# Patient Record
Sex: FEMALE | Race: WHITE | NOT HISPANIC OR LATINO | URBAN - METROPOLITAN AREA
[De-identification: names, ages, dates, MRNs, and addresses within clinical notes are randomized per-mention and may not be internally consistent; named-entity substitution may affect disease eponyms.]

---

## 2017-08-24 ENCOUNTER — EMERGENCY (EMERGENCY)
Facility: HOSPITAL | Age: 82
LOS: 0 days | Discharge: HOME | End: 2017-08-25
Attending: EMERGENCY MEDICINE
Payer: COMMERCIAL

## 2017-08-24 VITALS
TEMPERATURE: 99 F | DIASTOLIC BLOOD PRESSURE: 65 MMHG | SYSTOLIC BLOOD PRESSURE: 135 MMHG | RESPIRATION RATE: 18 BRPM | HEART RATE: 70 BPM | OXYGEN SATURATION: 98 % | WEIGHT: 125 LBS | HEIGHT: 62 IN

## 2017-08-24 DIAGNOSIS — E78.5 HYPERLIPIDEMIA, UNSPECIFIED: ICD-10-CM

## 2017-08-24 DIAGNOSIS — Z01.89 ENCOUNTER FOR OTHER SPECIFIED SPECIAL EXAMINATIONS: ICD-10-CM

## 2017-08-24 DIAGNOSIS — R73.09 OTHER ABNORMAL GLUCOSE: ICD-10-CM

## 2017-08-24 DIAGNOSIS — I10 ESSENTIAL (PRIMARY) HYPERTENSION: ICD-10-CM

## 2017-08-24 DIAGNOSIS — E87.5 HYPERKALEMIA: ICD-10-CM

## 2017-08-24 LAB
POTASSIUM SERPL-MCNC: 5.2 MMOL/L — SIGNIFICANT CHANGE UP (ref 3.5–5.3)
POTASSIUM SERPL-SCNC: 5.2 MMOL/L — SIGNIFICANT CHANGE UP (ref 3.5–5.3)

## 2017-08-24 PROCEDURE — 99284 EMERGENCY DEPT VISIT MOD MDM: CPT

## 2017-08-24 NOTE — ED ADULT NURSE NOTE - OBJECTIVE STATEMENT
patient is AxOx4; saw her doctor today for blood work in the morning, was called back at night and was told to go to ER because of hyperkalimia.

## 2017-08-25 VITALS
HEART RATE: 85 BPM | SYSTOLIC BLOOD PRESSURE: 134 MMHG | RESPIRATION RATE: 20 BRPM | OXYGEN SATURATION: 99 % | TEMPERATURE: 98 F | DIASTOLIC BLOOD PRESSURE: 69 MMHG

## 2017-08-25 NOTE — ED PROVIDER NOTE - OBJECTIVE STATEMENT
Pertinent PMH/PSH/FHx/SHx and Review of Systems contained within:  89 yo f with PMH of HLD and HTN referred to ED by PMD for hyperkalemia on blood work in office. Patient denies any symptoms and states she feels well. No aggravating or relieving factors, No fever/chills, No photophobia/eye pain/changes in vision, No ear pain/sore throat/dysphagia, No chest pain/palpitations, no SOB/cough/wheeze/stridor, No abdominal pain, No N/V/D, no dysuria/frequency/discharge, No neck/back pain, no rash, no changes in neurological status/function.

## 2017-08-25 NOTE — ED PROVIDER NOTE - MEDICAL DECISION MAKING DETAILS
Labs reviewed. Patient in good condition. She is now discharged with care instructions. Patient is to follow up with pmd. Discussed results and outcome of testing with the patient.  Patient advised to please follow up with their primary care doctor within the next 24 hours and return to the Emergency Department for worsening symptoms or any other concerns.  Patient advised that their doctor may call  to follow up on the specific results of the tests performed today in the emergency department.

## 2019-08-12 ENCOUNTER — EMERGENCY (EMERGENCY)
Facility: HOSPITAL | Age: 84
LOS: 0 days | Discharge: HOME | End: 2019-08-12
Attending: EMERGENCY MEDICINE
Payer: COMMERCIAL

## 2019-08-12 ENCOUNTER — TRANSCRIPTION ENCOUNTER (OUTPATIENT)
Age: 84
End: 2019-08-12

## 2019-08-12 ENCOUNTER — INPATIENT (INPATIENT)
Facility: HOSPITAL | Age: 84
LOS: 3 days | Discharge: HOME | DRG: 86 | End: 2019-08-16
Attending: STUDENT IN AN ORGANIZED HEALTH CARE EDUCATION/TRAINING PROGRAM | Admitting: HOSPITALIST
Payer: COMMERCIAL

## 2019-08-12 VITALS
RESPIRATION RATE: 18 BRPM | TEMPERATURE: 98 F | HEART RATE: 78 BPM | SYSTOLIC BLOOD PRESSURE: 158 MMHG | OXYGEN SATURATION: 99 % | DIASTOLIC BLOOD PRESSURE: 77 MMHG

## 2019-08-12 VITALS
DIASTOLIC BLOOD PRESSURE: 68 MMHG | TEMPERATURE: 98 F | WEIGHT: 130.07 LBS | SYSTOLIC BLOOD PRESSURE: 166 MMHG | HEART RATE: 72 BPM | OXYGEN SATURATION: 100 % | RESPIRATION RATE: 18 BRPM | HEIGHT: 63 IN

## 2019-08-12 VITALS
WEIGHT: 117.07 LBS | SYSTOLIC BLOOD PRESSURE: 150 MMHG | OXYGEN SATURATION: 98 % | RESPIRATION RATE: 18 BRPM | HEART RATE: 89 BPM | TEMPERATURE: 98 F | DIASTOLIC BLOOD PRESSURE: 90 MMHG

## 2019-08-12 DIAGNOSIS — I10 ESSENTIAL (PRIMARY) HYPERTENSION: ICD-10-CM

## 2019-08-12 DIAGNOSIS — R41.82 ALTERED MENTAL STATUS, UNSPECIFIED: ICD-10-CM
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
## 2019-08-12 DIAGNOSIS — S06.2X0A DIFFUSE TRAUMATIC BRAIN INJURY WITHOUT LOSS OF CONSCIOUSNESS, INITIAL ENCOUNTER: ICD-10-CM

## 2019-08-12 DIAGNOSIS — Z79.82 LONG TERM (CURRENT) USE OF ASPIRIN: ICD-10-CM

## 2019-08-12 DIAGNOSIS — E78.5 HYPERLIPIDEMIA, UNSPECIFIED: ICD-10-CM
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
## 2019-08-12 LAB
ALBUMIN SERPL ELPH-MCNC: 3.9 G/DL — SIGNIFICANT CHANGE UP (ref 3.3–5)
ALBUMIN SERPL ELPH-MCNC: 4.4 G/DL — SIGNIFICANT CHANGE UP (ref 3.3–5)
ALP SERPL-CCNC: 68 U/L — SIGNIFICANT CHANGE UP (ref 40–120)
ALP SERPL-CCNC: 75 U/L — SIGNIFICANT CHANGE UP (ref 40–120)
ALT FLD-CCNC: 19 U/L — SIGNIFICANT CHANGE UP (ref 10–45)
ALT FLD-CCNC: 27 U/L — SIGNIFICANT CHANGE UP (ref 12–78)
ANION GAP SERPL CALC-SCNC: 12 MMOL/L — SIGNIFICANT CHANGE UP (ref 5–17)
ANION GAP SERPL CALC-SCNC: 15 MMOL/L — SIGNIFICANT CHANGE UP (ref 5–17)
APTT BLD: 27.1 SEC — LOW (ref 27.5–36.3)
APTT BLD: 29.6 SEC — SIGNIFICANT CHANGE UP (ref 28.5–37)
AST SERPL-CCNC: 25 U/L — SIGNIFICANT CHANGE UP (ref 10–40)
AST SERPL-CCNC: 27 U/L — SIGNIFICANT CHANGE UP (ref 15–37)
BASOPHILS # BLD AUTO: 0 K/UL — SIGNIFICANT CHANGE UP (ref 0–0.2)
BASOPHILS # BLD AUTO: 0.01 K/UL — SIGNIFICANT CHANGE UP (ref 0–0.2)
BASOPHILS NFR BLD AUTO: 0 % — SIGNIFICANT CHANGE UP (ref 0–2)
BASOPHILS NFR BLD AUTO: 0.1 % — SIGNIFICANT CHANGE UP (ref 0–2)
BILIRUB SERPL-MCNC: 0.9 MG/DL — SIGNIFICANT CHANGE UP (ref 0.2–1.2)
BILIRUB SERPL-MCNC: 0.9 MG/DL — SIGNIFICANT CHANGE UP (ref 0.2–1.2)
BLD GP AB SCN SERPL QL: NEGATIVE — SIGNIFICANT CHANGE UP
BLD GP AB SCN SERPL QL: SIGNIFICANT CHANGE UP
BUN SERPL-MCNC: 23 MG/DL — SIGNIFICANT CHANGE UP (ref 7–23)
BUN SERPL-MCNC: 27 MG/DL — HIGH (ref 7–23)
CALCIUM SERPL-MCNC: 9.2 MG/DL — SIGNIFICANT CHANGE UP (ref 8.5–10.1)
CALCIUM SERPL-MCNC: 9.5 MG/DL — SIGNIFICANT CHANGE UP (ref 8.4–10.5)
CHLORIDE SERPL-SCNC: 84 MMOL/L — LOW (ref 96–108)
CHLORIDE SERPL-SCNC: 87 MMOL/L — LOW (ref 96–108)
CO2 SERPL-SCNC: 22 MMOL/L — SIGNIFICANT CHANGE UP (ref 22–31)
CO2 SERPL-SCNC: 23 MMOL/L — SIGNIFICANT CHANGE UP (ref 22–31)
CREAT SERPL-MCNC: 1.29 MG/DL — SIGNIFICANT CHANGE UP (ref 0.5–1.3)
CREAT SERPL-MCNC: 1.74 MG/DL — HIGH (ref 0.5–1.3)
EOSINOPHIL # BLD AUTO: 0 K/UL — SIGNIFICANT CHANGE UP (ref 0–0.5)
EOSINOPHIL # BLD AUTO: 0 K/UL — SIGNIFICANT CHANGE UP (ref 0–0.5)
EOSINOPHIL NFR BLD AUTO: 0 % — SIGNIFICANT CHANGE UP (ref 0–6)
EOSINOPHIL NFR BLD AUTO: 0.2 % — SIGNIFICANT CHANGE UP (ref 0–6)
GLUCOSE SERPL-MCNC: 135 MG/DL — HIGH (ref 70–99)
GLUCOSE SERPL-MCNC: 151 MG/DL — HIGH (ref 70–99)
HCT VFR BLD CALC: 33.5 % — LOW (ref 34.5–45)
HCT VFR BLD CALC: 34.4 % — LOW (ref 34.5–45)
HGB BLD-MCNC: 11.8 G/DL — SIGNIFICANT CHANGE UP (ref 11.5–15.5)
HGB BLD-MCNC: 11.9 G/DL — SIGNIFICANT CHANGE UP (ref 11.5–15.5)
IMM GRANULOCYTES NFR BLD AUTO: 0.8 % — SIGNIFICANT CHANGE UP (ref 0–1.5)
INR BLD: 0.97 RATIO — SIGNIFICANT CHANGE UP (ref 0.88–1.16)
INR BLD: 1.1 RATIO — SIGNIFICANT CHANGE UP (ref 0.88–1.16)
LYMPHOCYTES # BLD AUTO: 0.5 K/UL — LOW (ref 1–3.3)
LYMPHOCYTES # BLD AUTO: 0.6 K/UL — LOW (ref 1–3.3)
LYMPHOCYTES # BLD AUTO: 4.2 % — LOW (ref 13–44)
LYMPHOCYTES # BLD AUTO: 5.6 % — LOW (ref 13–44)
MCHC RBC-ENTMCNC: 30.7 PG — SIGNIFICANT CHANGE UP (ref 27–34)
MCHC RBC-ENTMCNC: 32 PG — SIGNIFICANT CHANGE UP (ref 27–34)
MCHC RBC-ENTMCNC: 34.6 GM/DL — SIGNIFICANT CHANGE UP (ref 32–36)
MCHC RBC-ENTMCNC: 35.1 GM/DL — SIGNIFICANT CHANGE UP (ref 32–36)
MCV RBC AUTO: 88.7 FL — SIGNIFICANT CHANGE UP (ref 80–100)
MCV RBC AUTO: 91 FL — SIGNIFICANT CHANGE UP (ref 80–100)
MONOCYTES # BLD AUTO: 0.9 K/UL — SIGNIFICANT CHANGE UP (ref 0–0.9)
MONOCYTES # BLD AUTO: 1.34 K/UL — HIGH (ref 0–0.9)
MONOCYTES NFR BLD AUTO: 11.2 % — SIGNIFICANT CHANGE UP (ref 2–14)
MONOCYTES NFR BLD AUTO: 8 % — SIGNIFICANT CHANGE UP (ref 2–14)
NEUTROPHILS # BLD AUTO: 9.5 K/UL — HIGH (ref 1.8–7.4)
NEUTROPHILS # BLD AUTO: 9.98 K/UL — HIGH (ref 1.8–7.4)
NEUTROPHILS NFR BLD AUTO: 83.7 % — HIGH (ref 43–77)
NEUTROPHILS NFR BLD AUTO: 86.2 % — HIGH (ref 43–77)
NRBC # BLD: 0 /100 WBCS — SIGNIFICANT CHANGE UP (ref 0–0)
PLATELET # BLD AUTO: 219 K/UL — SIGNIFICANT CHANGE UP (ref 150–400)
PLATELET # BLD AUTO: 233 K/UL — SIGNIFICANT CHANGE UP (ref 150–400)
POTASSIUM SERPL-MCNC: 4.3 MMOL/L — SIGNIFICANT CHANGE UP (ref 3.5–5.3)
POTASSIUM SERPL-MCNC: 4.8 MMOL/L — SIGNIFICANT CHANGE UP (ref 3.5–5.3)
POTASSIUM SERPL-SCNC: 4.3 MMOL/L — SIGNIFICANT CHANGE UP (ref 3.5–5.3)
POTASSIUM SERPL-SCNC: 4.8 MMOL/L — SIGNIFICANT CHANGE UP (ref 3.5–5.3)
PROT SERPL-MCNC: 7.2 G/DL — SIGNIFICANT CHANGE UP (ref 6–8.3)
PROT SERPL-MCNC: 7.7 GM/DL — SIGNIFICANT CHANGE UP (ref 6–8.3)
PROTHROM AB SERPL-ACNC: 11.2 SEC — SIGNIFICANT CHANGE UP (ref 10–12.9)
PROTHROM AB SERPL-ACNC: 12.4 SEC — SIGNIFICANT CHANGE UP (ref 10–12.9)
RBC # BLD: 3.68 M/UL — LOW (ref 3.8–5.2)
RBC # BLD: 3.88 M/UL — SIGNIFICANT CHANGE UP (ref 3.8–5.2)
RBC # FLD: 11.4 % — SIGNIFICANT CHANGE UP (ref 10.3–14.5)
RBC # FLD: 12.4 % — SIGNIFICANT CHANGE UP (ref 10.3–14.5)
RH IG SCN BLD-IMP: NEGATIVE — SIGNIFICANT CHANGE UP
RH IG SCN BLD-IMP: NEGATIVE — SIGNIFICANT CHANGE UP
SODIUM SERPL-SCNC: 121 MMOL/L — LOW (ref 135–145)
SODIUM SERPL-SCNC: 122 MMOL/L — LOW (ref 135–145)
WBC # BLD: 11 K/UL — HIGH (ref 3.8–10.5)
WBC # BLD: 11.92 K/UL — HIGH (ref 3.8–10.5)
WBC # FLD AUTO: 11 K/UL — HIGH (ref 3.8–10.5)
WBC # FLD AUTO: 11.92 K/UL — HIGH (ref 3.8–10.5)

## 2019-08-12 PROCEDURE — 70450 CT HEAD/BRAIN W/O DYE: CPT | Mod: 26

## 2019-08-12 PROCEDURE — 99291 CRITICAL CARE FIRST HOUR: CPT

## 2019-08-12 RX ORDER — SODIUM CHLORIDE 9 MG/ML
1000 INJECTION INTRAMUSCULAR; INTRAVENOUS; SUBCUTANEOUS ONCE
Refills: 0 | Status: COMPLETED | OUTPATIENT
Start: 2019-08-12 | End: 2019-08-12

## 2019-08-12 RX ORDER — LEVETIRACETAM 250 MG/1
1000 TABLET, FILM COATED ORAL ONCE
Refills: 0 | Status: COMPLETED | OUTPATIENT
Start: 2019-08-12 | End: 2019-08-12

## 2019-08-12 RX ORDER — PROTHROMBIN COMPLEX CONCENTRATE (HUMAN) 25.5; 16.5; 24; 22; 22; 26 [IU]/ML; [IU]/ML; [IU]/ML; [IU]/ML; [IU]/ML; [IU]/ML
2500 POWDER, FOR SOLUTION INTRAVENOUS ONCE
Refills: 0 | Status: COMPLETED | OUTPATIENT
Start: 2019-08-12 | End: 2019-08-12

## 2019-08-12 RX ADMIN — SODIUM CHLORIDE 1000 MILLILITER(S): 9 INJECTION INTRAMUSCULAR; INTRAVENOUS; SUBCUTANEOUS at 21:35

## 2019-08-12 RX ADMIN — PROTHROMBIN COMPLEX CONCENTRATE (HUMAN) 400 INTERNATIONAL UNIT(S): 25.5; 16.5; 24; 22; 22; 26 POWDER, FOR SOLUTION INTRAVENOUS at 19:29

## 2019-08-12 NOTE — ED ADULT NURSE NOTE - OBJECTIVE STATEMENT
pt received to bed 26 c/o dizzy, intermittent headache, lightheadedness. pt states she fell yesterday and hit the back of her head on the bathroom floor. denies: LOC. laceration with staples noted in the back of head. pt states the staples were put in "at the medical center yesterday." denies: chest pain, SOB, n/v/d. pt is alert and oriented. small amount of blood noted on bedsheet from wound in back of head. pt states she usually walks by herself, without assistance.

## 2019-08-12 NOTE — ED PROVIDER NOTE - PROGRESS NOTE DETAILS
Joseph Frankel PGY1: Spoke with family. Daughter states that patient is not on Aspirin and has stopped it months ago. Will hold platelets for now as patient is not on Aspirin, and family did not like the idea of giving platelets. Joseph Frankel PGY1: Spoke with neurology resident. Recommended maintenance Keppra and potential NSU consult. I contacted NSU who forwarded me to Dr. Carlos Tilmlan- neurosurgery resident who said that their is no surgical intervention and thus no need for ICU. Will follow up. Joseph Frankel:  Consulted MICU for hyponatremia and q1hour neuro checks. Agreed to see patient

## 2019-08-12 NOTE — ED PROVIDER NOTE - OBJECTIVE STATEMENT
89 year old female with HTN and afib on Eliquis, Plavix, and aspirin who presents as a transfer from [place] status post head trauma secondary to fall which occurred yesterday. Patient lives alone and is completely independent. Following fall, patient went to bed. Today, daughter was speaking with patient by phone and noticed that her mother was confused. Daughter then took patient to urgent care where they sent her to the [place]. Patient found to have XXXX. They administered KCentra and transferred to [facility]. Daughter says that mom is still acting confused. Patient endorses some nausea but no vomiting. Endorses some head pain. Patient states that she began to feel funny after hitting her head yesterday. Denies vision changes. However, daughter noticed last week, that patient was acting confused last week.        At baseline, patient is independent, lives alone and is able to walk with out a walker and care for herself. No known history of previous stroke. 89 year old female with HTN and afib on Eliquis, Plavix, and aspirin who presents as a transfer from [place] status post head trauma secondary to fall which occurred yesterday. Patient lives alone and is completely independent. Following fall, patient went to bed. Today, daughter was speaking with patient by phone and noticed that her mother was confused. Daughter then took patient to urgent care where they sent her to the [place].   CT showed:   1)  hemorrhagic contusions are noted in the right temporal lobe and in   the inferior frontal lobes right greater than left. In conjunction there   is underlying scattered chronic ischemic changes in both hemispheres with   volume loss. 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  2)  there is localized extracerebral blood in the right temporal fossa   and inferior anterior cranial fossa, but there is no large subdural or   epidural collection.     They administered KCentra and transferred to [facility]. Daughter says that mom is still acting confused. Patient endorses some nausea but no vomiting. Endorses some head pain. Patient states that she began to feel funny after hitting her head yesterday. Denies vision changes. However, daughter noticed last week, that patient was acting confused last week.        At baseline, patient is independent, lives alone and is able to walk with out a walker and care for herself. No known history of previous stroke. 89 year old female with HTN and afib on Eliquis, Plavix, and aspirin who presents as a transfer from [place] status post head trauma secondary to fall which occurred yesterday. Patient lives alone and is completely independent. Following fall, patient went to bed. Today, daughter was speaking with patient by phone and noticed that her mother was confused. Daughter then took patient to urgent care where they sent her to the [place].   CT showed:   1)  hemorrhagic contusions are noted in the right temporal lobe and in   the inferior frontal lobes right greater than left. In conjunction there   is underlying scattered chronic ischemic changes in both hemispheres with   volume loss. 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  2)  there is localized extracerebral blood in the right temporal fossa   and inferior anterior cranial fossa, but there is no large subdural or   epidural collection.   They administered KCentra and transferred to [facility]. Daughter says that mom is still acting confused. Patient endorses some nausea but no vomiting. Endorses some head pain. Patient states that she began to feel funny after hitting her head yesterday. Denies vision changes. However, daughter noticed last week, that patient was acting confused last week.      At baseline, patient is independent, lives alone and is able to walk with out a walker and care for herself. No known history of previous stroke. 89 year old female with HTN and afib on Eliquis (pt and daughter deny patient is on aspirin) who presents as a transfer from [facility] post head trauma secondary to fall which occurred yesterday. Patient lives alone and is completely independent. Following fall, patient went to bed. Today, daughter was speaking with patient by phone and noticed that her mother was confused. Daughter then took patient to urgent care where they sent her to the [place].   CT showed:   1)  hemorrhagic contusions are noted in the right temporal lobe and in the inferior frontal lobes right greater than left. In conjunction there is underlying scattered chronic ischemic changes in both hemispheres with volume loss. 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  2)  there is localized extracerebral blood in the right temporal fossa and inferior anterior cranial fossa, but there is no large subdural or epidural collection.   They administered KCentra and transferred to [facility]. Daughter says that mom is still acting confused. Patient endorses some nausea but no vomiting. Endorses some head pain. Patient states that she began to feel funny after hitting her head yesterday. Denies vision changes. However, daughter noticed last week, that patient was acting confused last week.      At baseline, patient is independent, lives alone and is able to walk with out a walker and care for herself. No known history of previous stroke.

## 2019-08-12 NOTE — ED PROVIDER NOTE - CARE PLAN
Principal Discharge DX:	Altered mental status Principal Discharge DX:	Altered mental status  Secondary Diagnosis:	Traumatic hemorrhage of right cerebrum without loss of consciousness, initial encounter

## 2019-08-12 NOTE — ED ADULT NURSE NOTE - OBJECTIVE STATEMENT
89 y/o female A&Ox2 with some confusion presents to ED via EMS from [place] for head bleed. As per EMS pt fell yesterday in the bathroom, hitting posterior surface of head and was seen at [facility] today for confusion and laceration. Pt daughter reports that mother lives alone and was more confused then normal today so brought pt to Urgent Care and was told to go to ED. 1 inch laceration to posterior scalp with staples placed at [place], +swelling and redness, no active bleeding. [place] found head bleed on CT, placed 20G IV in LAC and administered Kcentra. +PERRLA 2mm, +strong hand  b/l, +flexion/extension b/l feet, +sensation to touch x4 extremities. Pt denies any weakness, numbness or tingling. No SOB, CP, fevers, chills, dysuria, n/v/d. Safety measures maintained with daughter at bedside. 89 y/o female A&Ox2 with some confusion presents to ED via EMS from [place] for head bleed. Pt takes aspirin and Eliquis daily. As per EMS pt fell yesterday in the bathroom, hitting posterior surface of head and was seen at [facility] today for confusion and laceration. Pt daughter reports that mother lives alone and was more confused then normal today so brought pt to Urgent Care and was told to go to ED. 1 inch laceration to posterior scalp with staples placed at [place], +swelling and redness, no active bleeding. [place] found head bleed on CT, placed 20G IV in LAC and administered Kcentra. +PERRLA 2mm, +strong hand  b/l, +flexion/extension b/l feet, +sensation to touch x4 extremities. +dizziness, no headache or change in vision. Pt denies any weakness, numbness or tingling. No SOB, CP, fevers, chills, dysuria, n/v/d. Safety measures maintained with daughter at bedside.

## 2019-08-12 NOTE — ED PROVIDER NOTE - SKIN, MLM
Skin normal color for race, warm, dry and intact. scalp laceration  with staples in place - still slight oozing of blood from area noted - 8cm.

## 2019-08-12 NOTE — ED ADULT NURSE NOTE - NSIMPLEMENTINTERV_GEN_ALL_ED
Implemented All Fall with Harm Risk Interventions:  [place] to call system. Call bell, personal items and telephone within reach. Instruct patient to call for assistance. Room bathroom lighting operational. Non-slip footwear when patient is off stretcher. Physically safe environment: no spills, clutter or unnecessary equipment. Stretcher in lowest position, wheels locked, appropriate side rails in place. Provide visual cue, wrist band, yellow gown, etc. Monitor gait and stability. Monitor for mental status changes and reorient to person, place, and time. Review medications for side effects contributing to fall risk. Reinforce activity limits and safety measures with patient and family. Provide visual clues: red socks.

## 2019-08-12 NOTE — ED PROVIDER NOTE - CHIEF COMPLAINT
The patient is a 89y Female complaining of The patient is a 89y Female complaining of head pain and disorientation.

## 2019-08-12 NOTE — ED PROVIDER NOTE - NS ED ROS FT
Gen: Denies fever, weight loss  CV: Denies chest pain, palpitations  Skin: Denies rash, erythema, color changes  Resp: Denies SOB, cough  Endo: Denies sensitivity to heat, cold, increased urination  GI: Denies constipation, vomiting  Msk: Denies back pain, LE swelling, extremity pain  : Denies dysuria, increased frequency  Neuro: Denies LOC, weakness, seizures  Psych: Denies hx of psych, hallucinations, +confusion

## 2019-08-12 NOTE — ED PROVIDER NOTE - PHYSICAL EXAMINATION
GENERAL: Patient lying in bed comfortably. In no acute distress. Answering questions appropriately but slightly confused.  HEENT: stapled laceration at back of skull, Pupils 4 mm at baseline to 3 mm with light, EOMI b/l, conjunctiva noninjected and anicteric,  moist mucous membranes  NECK: Supple, trachea midline  LUNG: CTAB, no w/r/r appreciated, good respiratory effort  CV: RRR, no m/r/g appreciated  ABDOMEN: Soft, NTND, no rebound or guarding, no appreciable organomegaly  MSK: No edema, no visible deformities, full range of motion UE/LE b/l, 4+/5 muscle strength LE b/l, 5/5 strength UE bl  NEURO: Patient able to communicate her name, President of [place], and that she was in hospital. Confused as to which date it is.  CN II-XII grossly intact, sensation grossly intact,   -Cranial Nerves:  --CN II: PERRLA  --CN III, IV, VI: EOMI b/l  --CN V1-3: Facial sensation intact to touch  --CN VII: No facial asymmetry or droop  --CN VIII: Hearing intact to rubbing fingers b/l  --CN IX, X: Palate elevates symmetrically. Normal phonation  --CN XI: Heading turning and shoulder shrug intact b/l  --CN XII: Tongue midline with normal movements  SKIN: Warm, dry, well perfused, no evidence of rash  PSYCH: Normal mood and affect GENERAL: Patient lying in bed comfortably. In no acute distress. Answering questions appropriately but slightly confused.  HEENT: stapled laceration at back of skull, Pupils 4 mm at baseline to 3 mm with light, EOMI b/l, conjunctiva non-injected and anicteric,  moist mucous membranes  NECK: Supple, trachea midline  LUNG: CTAB, no w/r/r appreciated, good respiratory effort  CV: RRR, no m/r/g appreciated  ABDOMEN: Soft, NTND, no rebound or guarding, no appreciable organomegaly  MSK: No edema, no visible deformities, full range of motion UE/LE b/l, 4+/5 muscle strength LE b/l, 5/5 strength UE bl  NEURO: Patient able to communicate her name, President of [place], and that she was in hospital. Confused as to which date it is.  CN II-XII grossly intact, sensation grossly intact,   -Cranial Nerves:  --CN II: PERRLA  --CN III, IV, VI: EOMI b/l  --CN V1-3: Facial sensation intact to touch  --CN VII: No facial asymmetry or droop  --CN VIII: Hearing intact to rubbing fingers b/l  --CN IX, X: Palate elevates symmetrically. Normal phonation  --CN XI: Heading turning and shoulder shrug intact b/l  --CN XII: Tongue midline with normal movements  SKIN: Warm, dry, well perfused, no evidence of rash  PSYCH: Normal mood and affect

## 2019-08-12 NOTE — ED ADULT TRIAGE NOTE - CHIEF COMPLAINT QUOTE
pt BIB daughter with c/o AMS after striking head yesterday large laceration to posterior scalp on eliquis, was seen and sent from urgent care to r/o bleed

## 2019-08-12 NOTE — ED PROVIDER NOTE - CLINICAL SUMMARY MEDICAL DECISION MAKING FREE TEXT BOX
89 year old female with HTN, HLD, and afib on Eliquis, Plavix, and aspirin who presents as a transfer from [facility] post head trauma secondary to fall which occurred yesterday. VS remarkable for hypertension. PE remarkable for altered mental status. Concern for intracranial bleed and therefore sent patient for CT. CT shows 89 year old female with HTN, HLD, and afib on Eliquis, Plavix, and aspirin who presents as a transfer from [facility] post head trauma secondary to fall which occurred yesterday. VS remarkable for hypertension. PE remarkable for altered mental status. Concern for expanding intracranial bleed and therefore sent patient for CT. Per Dr. Tillman, neurosurgery resident, CT shows stable picture of some intracerebral contusion, but no epidural or subdural noted and no need for neurosurgical intervention. Per his recs- will administer platelets and consult trauma. Will reassess.

## 2019-08-12 NOTE — ED PROVIDER NOTE - NOTES
Joseph Frankel PGY1: Spoke with Dr. Carlos Tillman of neurosurgery. Is aware of patient. Dr Tillman reviewed the CT images from [place] and one taken upon arrival at [facility] and says that the scan is stable. Does not recommend any surgical intervention at this time. Recommends platelet transfusion. I will consent patient and family regarding platelet transfusion.

## 2019-08-12 NOTE — ED PROVIDER NOTE - ATTENDING CONTRIBUTION TO CARE
Patient received as a transfer from [facility] as right frontal hemorrhagic contusions to the right temporal and frontal lobe.   Patient fell yesterday in the afternoon and slept yesterday without seeking care.   patient has been more confused, with mild nausea  mild confusion, alert to name, location, president, does not know date  non-tachycardic, non-tachypneic   cooperative  5/5 str to bilateral UE and 4+/5 bilateral LE, 5/5 sensory to bilateral UE and LE   no rash/vesicles/petechiae   stapled laceration to right temporal region  perrl ~4mm  following commands  trachea midline  will get ct head, neurosurgery consulted at this time 20:45, labs, and reassess.   Will follow up on labs, analgesia, imaging, reassess and disposition to the inpatient team as clinically indicated.

## 2019-08-12 NOTE — ED PROVIDER NOTE - OBJECTIVE STATEMENT
89 year old female with PMH of HTN, HLD, Hypothyroid on eliquis for afib otherwise presenting to ED after fall in bathroom yesterday- states missed toilet seat and ended up hitting head against part of toilet seat - seen in Urgent care yesterday and [place] placed there - today as pt with occasional periods of confusion.

## 2019-08-13 DIAGNOSIS — Z29.9 ENCOUNTER FOR PROPHYLACTIC MEASURES, UNSPECIFIED: ICD-10-CM

## 2019-08-13 DIAGNOSIS — E87.1 HYPO-OSMOLALITY AND HYPONATREMIA: ICD-10-CM

## 2019-08-13 DIAGNOSIS — E03.9 HYPOTHYROIDISM, UNSPECIFIED: ICD-10-CM

## 2019-08-13 DIAGNOSIS — I48.91 UNSPECIFIED ATRIAL FIBRILLATION: ICD-10-CM

## 2019-08-13 DIAGNOSIS — E78.5 HYPERLIPIDEMIA, UNSPECIFIED: ICD-10-CM

## 2019-08-13 DIAGNOSIS — R41.82 ALTERED MENTAL STATUS, UNSPECIFIED: ICD-10-CM

## 2019-08-13 DIAGNOSIS — S06.349A TRAUMATIC HEMORRHAGE OF RIGHT CEREBRUM WITH LOSS OF CONSCIOUSNESS OF UNSPECIFIED DURATION, INITIAL ENCOUNTER: ICD-10-CM

## 2019-08-13 DIAGNOSIS — N17.9 ACUTE KIDNEY FAILURE, UNSPECIFIED: ICD-10-CM

## 2019-08-13 DIAGNOSIS — I10 ESSENTIAL (PRIMARY) HYPERTENSION: ICD-10-CM

## 2019-08-13 DIAGNOSIS — S06.340A TRAUMATIC HEMORRHAGE OF RIGHT CEREBRUM WITHOUT LOSS OF CONSCIOUSNESS, INITIAL ENCOUNTER: ICD-10-CM

## 2019-08-13 LAB
ANION GAP SERPL CALC-SCNC: 12 MMOL/L — SIGNIFICANT CHANGE UP (ref 5–17)
ANION GAP SERPL CALC-SCNC: 12 MMOL/L — SIGNIFICANT CHANGE UP (ref 5–17)
ANION GAP SERPL CALC-SCNC: 14 MMOL/L — SIGNIFICANT CHANGE UP (ref 5–17)
BUN SERPL-MCNC: 13 MG/DL — SIGNIFICANT CHANGE UP (ref 7–23)
BUN SERPL-MCNC: 17 MG/DL — SIGNIFICANT CHANGE UP (ref 7–23)
BUN SERPL-MCNC: 18 MG/DL — SIGNIFICANT CHANGE UP (ref 7–23)
CALCIUM SERPL-MCNC: 8.6 MG/DL — SIGNIFICANT CHANGE UP (ref 8.4–10.5)
CALCIUM SERPL-MCNC: 9 MG/DL — SIGNIFICANT CHANGE UP (ref 8.4–10.5)
CALCIUM SERPL-MCNC: 9.1 MG/DL — SIGNIFICANT CHANGE UP (ref 8.4–10.5)
CHLORIDE SERPL-SCNC: 87 MMOL/L — LOW (ref 96–108)
CHLORIDE SERPL-SCNC: 91 MMOL/L — LOW (ref 96–108)
CHLORIDE SERPL-SCNC: 91 MMOL/L — LOW (ref 96–108)
CK SERPL-CCNC: 207 U/L — HIGH (ref 25–170)
CO2 SERPL-SCNC: 21 MMOL/L — LOW (ref 22–31)
CO2 SERPL-SCNC: 22 MMOL/L — SIGNIFICANT CHANGE UP (ref 22–31)
CO2 SERPL-SCNC: 24 MMOL/L — SIGNIFICANT CHANGE UP (ref 22–31)
CREAT ?TM UR-MCNC: 70 MG/DL — SIGNIFICANT CHANGE UP
CREAT SERPL-MCNC: 0.79 MG/DL — SIGNIFICANT CHANGE UP (ref 0.5–1.3)
CREAT SERPL-MCNC: 0.87 MG/DL — SIGNIFICANT CHANGE UP (ref 0.5–1.3)
CREAT SERPL-MCNC: 1 MG/DL — SIGNIFICANT CHANGE UP (ref 0.5–1.3)
GLUCOSE SERPL-MCNC: 102 MG/DL — HIGH (ref 70–99)
GLUCOSE SERPL-MCNC: 114 MG/DL — HIGH (ref 70–99)
GLUCOSE SERPL-MCNC: 143 MG/DL — HIGH (ref 70–99)
OSMOLALITY SERPL: 264 MOSMOL/KG — LOW (ref 280–301)
OSMOLALITY UR: 467 MOS/KG — SIGNIFICANT CHANGE UP (ref 300–900)
OSMOLALITY UR: 575 MOS/KG — SIGNIFICANT CHANGE UP (ref 300–900)
POTASSIUM SERPL-MCNC: 4.1 MMOL/L — SIGNIFICANT CHANGE UP (ref 3.5–5.3)
POTASSIUM SERPL-MCNC: 4.1 MMOL/L — SIGNIFICANT CHANGE UP (ref 3.5–5.3)
POTASSIUM SERPL-MCNC: 4.6 MMOL/L — SIGNIFICANT CHANGE UP (ref 3.5–5.3)
POTASSIUM SERPL-SCNC: 4.1 MMOL/L — SIGNIFICANT CHANGE UP (ref 3.5–5.3)
POTASSIUM SERPL-SCNC: 4.1 MMOL/L — SIGNIFICANT CHANGE UP (ref 3.5–5.3)
POTASSIUM SERPL-SCNC: 4.6 MMOL/L — SIGNIFICANT CHANGE UP (ref 3.5–5.3)
POTASSIUM UR-SCNC: 36 MMOL/L — SIGNIFICANT CHANGE UP
SODIUM SERPL-SCNC: 122 MMOL/L — LOW (ref 135–145)
SODIUM SERPL-SCNC: 125 MMOL/L — LOW (ref 135–145)
SODIUM SERPL-SCNC: 127 MMOL/L — LOW (ref 135–145)
SODIUM UR-SCNC: 75 MMOL/L — SIGNIFICANT CHANGE UP
SODIUM UR-SCNC: 88 MMOL/L — SIGNIFICANT CHANGE UP

## 2019-08-13 PROCEDURE — 99223 1ST HOSP IP/OBS HIGH 75: CPT | Mod: GC

## 2019-08-13 PROCEDURE — 99222 1ST HOSP IP/OBS MODERATE 55: CPT

## 2019-08-13 PROCEDURE — 99285 EMERGENCY DEPT VISIT HI MDM: CPT | Mod: GC

## 2019-08-13 RX ORDER — AMLODIPINE BESYLATE 2.5 MG/1
1 TABLET ORAL
Qty: 0 | Refills: 0 | DISCHARGE

## 2019-08-13 RX ORDER — LEVOTHYROXINE SODIUM 125 MCG
1 TABLET ORAL
Qty: 0 | Refills: 0 | DISCHARGE

## 2019-08-13 RX ORDER — LEVOTHYROXINE SODIUM 125 MCG
50 TABLET ORAL DAILY
Refills: 0 | Status: DISCONTINUED | OUTPATIENT
Start: 2019-08-13 | End: 2019-08-16

## 2019-08-13 RX ORDER — SODIUM CHLORIDE 9 MG/ML
1 INJECTION INTRAMUSCULAR; INTRAVENOUS; SUBCUTANEOUS
Refills: 0 | Status: DISCONTINUED | OUTPATIENT
Start: 2019-08-13 | End: 2019-08-14

## 2019-08-13 RX ORDER — ATENOLOL 25 MG/1
25 TABLET ORAL EVERY 12 HOURS
Refills: 0 | Status: DISCONTINUED | OUTPATIENT
Start: 2019-08-13 | End: 2019-08-16

## 2019-08-13 RX ORDER — AMLODIPINE BESYLATE 2.5 MG/1
5 TABLET ORAL DAILY
Refills: 0 | Status: DISCONTINUED | OUTPATIENT
Start: 2019-08-13 | End: 2019-08-16

## 2019-08-13 RX ORDER — SODIUM CHLORIDE 5 G/100ML
500 INJECTION, SOLUTION INTRAVENOUS
Refills: 0 | Status: DISCONTINUED | OUTPATIENT
Start: 2019-08-13 | End: 2019-08-14

## 2019-08-13 RX ORDER — ATORVASTATIN CALCIUM 80 MG/1
0 TABLET, FILM COATED ORAL
Qty: 0 | Refills: 0 | DISCHARGE

## 2019-08-13 RX ORDER — LEVETIRACETAM 250 MG/1
1000 TABLET, FILM COATED ORAL ONCE
Refills: 0 | Status: DISCONTINUED | OUTPATIENT
Start: 2019-08-13 | End: 2019-08-13

## 2019-08-13 RX ORDER — AMLODIPINE BESYLATE 2.5 MG/1
0 TABLET ORAL
Qty: 0 | Refills: 0 | DISCHARGE

## 2019-08-13 RX ORDER — SODIUM CHLORIDE 9 MG/ML
1000 INJECTION INTRAMUSCULAR; INTRAVENOUS; SUBCUTANEOUS ONCE
Refills: 0 | Status: COMPLETED | OUTPATIENT
Start: 2019-08-13 | End: 2019-08-13

## 2019-08-13 RX ORDER — LEVOTHYROXINE SODIUM 125 MCG
0 TABLET ORAL
Qty: 0 | Refills: 0 | DISCHARGE

## 2019-08-13 RX ORDER — ASPIRIN/CALCIUM CARB/MAGNESIUM 324 MG
0 TABLET ORAL
Qty: 0 | Refills: 0 | DISCHARGE

## 2019-08-13 RX ORDER — ATENOLOL 25 MG/1
1 TABLET ORAL
Qty: 0 | Refills: 0 | DISCHARGE

## 2019-08-13 RX ORDER — ATORVASTATIN CALCIUM 80 MG/1
10 TABLET, FILM COATED ORAL AT BEDTIME
Refills: 0 | Status: DISCONTINUED | OUTPATIENT
Start: 2019-08-13 | End: 2019-08-16

## 2019-08-13 RX ORDER — LISINOPRIL 2.5 MG/1
0 TABLET ORAL
Qty: 0 | Refills: 0 | DISCHARGE

## 2019-08-13 RX ORDER — ATENOLOL 25 MG/1
0 TABLET ORAL
Qty: 0 | Refills: 0 | DISCHARGE

## 2019-08-13 RX ADMIN — SODIUM CHLORIDE 1000 MILLILITER(S): 9 INJECTION INTRAMUSCULAR; INTRAVENOUS; SUBCUTANEOUS at 04:37

## 2019-08-13 RX ADMIN — SODIUM CHLORIDE 1 GRAM(S): 9 INJECTION INTRAMUSCULAR; INTRAVENOUS; SUBCUTANEOUS at 22:22

## 2019-08-13 RX ADMIN — ATORVASTATIN CALCIUM 10 MILLIGRAM(S): 80 TABLET, FILM COATED ORAL at 22:22

## 2019-08-13 RX ADMIN — SODIUM CHLORIDE 70 MILLILITER(S): 5 INJECTION, SOLUTION INTRAVENOUS at 23:13

## 2019-08-13 RX ADMIN — LEVETIRACETAM 400 MILLIGRAM(S): 250 TABLET, FILM COATED ORAL at 00:46

## 2019-08-13 RX ADMIN — SODIUM CHLORIDE 1000 MILLILITER(S): 9 INJECTION INTRAMUSCULAR; INTRAVENOUS; SUBCUTANEOUS at 03:00

## 2019-08-13 RX ADMIN — SODIUM CHLORIDE 70 MILLILITER(S): 5 INJECTION, SOLUTION INTRAVENOUS at 22:19

## 2019-08-13 RX ADMIN — ATENOLOL 25 MILLIGRAM(S): 25 TABLET ORAL at 18:20

## 2019-08-13 NOTE — H&P ADULT - NSHPLABSRESULTS_GEN_ALL_CORE
LABS:                        11.8   11.0  )-----------( 219      ( 12 Aug 2019 22:04 )             33.5     13 Aug 2019 10:45    127    |  91     |  13     ----------------------------<  102    4.1     |  24     |  0.79     Ca    8.6        13 Aug 2019 10:45    TPro  7.2    /  Alb  4.4    /  TBili  0.9    /  DBili  x      /  AST  25     /  ALT  19     /  AlkPhos  68     12 Aug 2019 22:04    PT/INR - ( 12 Aug 2019 22:04 )   PT: 11.2 sec;   INR: 0.97 ratio         PTT - ( 12 Aug 2019 22:04 )  PTT:27.1 sec  CAPILLARY BLOOD GLUCOSE        BLOOD CULTURE    RADIOLOGY & ADDITIONAL TESTS:    EXAM:  CT BRAIN                            PROCEDURE DATE:  08/12/2019            INTERPRETATION:  CLINICAL INFORMATION: Intracranial hemorrhage. Evaluate   for progression.    TECHNIQUE: Noncontrast axial CT images were acquired through the head.   Two-dimensional sagittal and coronal reformats were generated.    COMPARISON STUDY: None    FINDINGS:     Hemorrhagic contusion in the right temporal lobe with surrounding areas   of scattered subarachnoid hemorrhage is without significant change.   Inferior right frontal hemorrhage in the region of the gyrus rectus also   appears grossly unchanged allowing for differences in head positioning   and slice angulation. There is questionable hemorrhage in the medial   inferior left frontal lobe as well. Focus of subarachnoid hemorrhage in   the left anterior middle cranial fossa (series 3 image 15) is stable. No   new areas of hemorrhage are seen. No evidence for acute large vascular   territory infarct. Stable small chronic appearing infarcts in the medial   left occipital and posterior left temporal lobe and PCA distribution.   Additional patchy areas of low-attenuation bihemispheric white matter are   nonspecific but likely scalloped mild chronic microvascular change.    There is no midline shift. There is no hydrocephalus.    The calvarium is intact. The soft tissues of the head are unremarkable.    Maxillary mucosal thickening and partial opacification within the   bilateral mastoid air cells and left sphenoid sinus comment unchanged.    IMPRESSION:     Stable exam. Inferior frontal and anterior right temporal lobe   hemorrhagic contusions and scattered subarachnoid hemorrhage as above. No   new hemorrhage or midline shift.                LOBO SELF M.D., RADIOLOGIST RESIDENT  This document has been electronically signed.  TANNER BOOTHE M.D., RADIOLOGIST  This document has been electronically signed. Aug 12 2019 10:29PM                Imaging Personally Reviewed:  [X] YES

## 2019-08-13 NOTE — H&P ADULT - ASSESSMENT
89-yo F w/ PMH of atrial fibrillation on Eliquis, CKD Stage 3, HTN, HLD, and hypothyroidism, sent from [place] for hemorrhagic R temporal contusion with scattered SAH s/p fall.

## 2019-08-13 NOTE — H&P ADULT - PROBLEM SELECTOR PLAN 3
- Presents with SCr 1.74. Baseline 1s - 1.2s.  - CKD stage 3 at baseline on outpatient record.  - S/p 3L NS in ED. SCr normalized to 0.79 and eGFR 65  - Will continue to trend BMP - Presents with SCr 1.74 to VS  Baseline 1s - 1.2s.  - CKD stage 3 at baseline on outpatient record.  - S/p 3L NS in ED. SCr normalized to 0.79 and eGFR 65  - Will continue to trend BMP

## 2019-08-13 NOTE — H&P ADULT - NSICDXPASTMEDICALHX_GEN_ALL_CORE_FT
PAST MEDICAL HISTORY:  Atrial fibrillation     HLD (hyperlipidemia)     HTN (hypertension)     Hypothyroidism

## 2019-08-13 NOTE — H&P ADULT - PROBLEM SELECTOR PLAN 2
- Hyponatremia at 122 noted on presentation  - Uosm 575, Cathleen 75, Sosm 264 noted. Likely SIADH  - Patient is noted to have chronically low serum Na since Aug 2018, however WNL previously.  - No focal neuro deficit noted at this time  - Nephrology consulted. Recs appreciated.  - S/p 3L NS. Corrected to 127 at this time. Will hold off further IVF hydration.  - Will recheck Uosm. Will order CK and renal US  - Q6H BMP  - Avoid rapid correction. Goal up to 10 mmol/L in 24 hours. - Hyponatremia at 122 noted on presentation to [place]  - Uosm 575, Cathleen 75, Sosm 264 noted. Likely SIADH  - Patient is noted to have chronically low serum Na since Aug 2018, however WNL previously.  - No focal neuro deficit noted at this time  - Nephrology consulted. Recs appreciated.  - S/p 3L NS. Corrected to 127 at this time. Will hold off further IVF hydration.  - Will recheck Uosm. Will order CK and renal US  - Q6H BMP  - Avoid rapid correction. Goal up to 10 mmol/L in 24 hours.

## 2019-08-13 NOTE — H&P ADULT - HISTORY OF PRESENT ILLNESS
Temo Summers MD, PhD | PGY-2, Day Admit  Department of Internal Medicine  Pager [phone] ([facility]) / [phone] ([facility])  Spectra [phone]    89-yo F w/ PMH of atrial fibrillation on Eliquis, CKD Stage 3, HTN, HLD, and hypothyroidism, sent from [place] for hemorrhagic R temporal contusion with scattered SAH s/p fall. Patient slipped while trying to use the toilet. Patient hit the back of the head onto the toilet. Patient remembers the fall and head trauma. No LOC. No visual hallucination, palpitations, or vomiting before or after the fall. Patient went to the bed to lie down. She had a phone conversation with her daughter, who noticed patient stuttering and searching for words. Patient presented to urgent care center to receive staples for the laceration from the head trauma in the occipital region. Patient was subsequently referred to [facility] CT head was performed, which revealed hemorrhagic R temporal contusion with scattered SAH. Patient was provided K-Centra and was subsequently transferred to [facility] for further management. Of note, patient is ambulatory without a walker at home. She lives alone. Last seen by daughter 2 weeks ago, and was of her normal health at that time. Currently denies fever, chills, nausea, vomiting, chest pain, or visual changes. Endorses headache and coldness.    In ED, T 97.7, HR 72, /68, RR 18, Sat 100% RA. WBC 11.92. Hgb 11.9, Na 122. Ofelia 75, Uosm 575, Sosm 264. BUN/Cr 27/1.74. Patient given 3L NS. NSy was consulted which recommended holding Eliquis with no acute surgical intervention. Keppra 500 mg BID was started.

## 2019-08-13 NOTE — H&P ADULT - NSHPREVIEWOFSYSTEMS_GEN_ALL_CORE
CONSTITUTIONAL: No fever or fatigue; 7 lbs weight loss in 4-5 months  EYES: No eye pain, visual disturbances, or discharge  ENMT:  No difficulty hearing, tinnitus, vertigo; No sinus or throat pain  RESPIRATORY: No cough, wheezing, chills or hemoptysis; No shortness of breath  CARDIOVASCULAR: No chest pain, palpitations, dizziness, or leg swelling  GASTROINTESTINAL: No abdominal or epigastric pain. No nausea, vomiting, or hematemesis; No diarrhea or constipation. No melena or hematochezia.  GENITOURINARY: No dysuria, frequency, hematuria, or incontinence  NEUROLOGICAL: +headaches; no visual changes  SKIN: No itching, burning, rashes, or lesions   LYMPH NODES: No enlarged glands  ENDOCRINE: +cold sensation  MUSCULOSKELETAL: No joint pain or swelling;   PSYCHIATRIC: Denies depression, anxiety  HEME/LYMPH: No easy bruising, or bleeding gums  ALLERGY AND IMMUNOLOGIC: No hives or eczema

## 2019-08-13 NOTE — CONSULT NOTE ADULT - SUBJECTIVE AND OBJECTIVE BOX
*************************************  NEUROLOGY CONSULT  NOTE  **************************************    YOLANDA SAINZ  Female  MRN-[id number]    HPI:    89 year old  lady with Hx of HTN and Afib on Eliquis (pt and daughter deny patient is on aspirin) who presents as a transfer from [facility] post head trauma secondary to fall which occurred yesterday as missed the toilet seat. Patient lives alone and is completely independent. Following fall, patient went to bed. Today, daughter was speaking with patient by phone and noticed that her mother was confused. Daughter then took patient to urgent care where they sent her to the [facility] where a CTH showed hemorrhagic contusion in the right temporal lobe with subsequent repeat CTH at [facility] showing hemorrhagic R temporal contusion with scattered SAH. Pt does recall she fell while trying to sit on the toilet and was confused afterwards and could not remember things. Exam significant for presbycusis, oriented to self only, mild-moderate expressive aphasia with impaired repetition and preserved naming; symmetric pain limited ROM in b/l LE (pain in knees). In ED labs with significant hyponatremia at 121. NIHSS = 5, MRS =1, ICH =1    ROS: Patient denies fever, chills, vision changes, tinnitus, dysarthria, dysphagia, dizziness, chest pain, palpitations, SOB, nausea, vomiting, diarrhea, dysuria and incontinence.    PAST MEDICAL & SURGICAL HISTORY:  HTN (hypertension)  HLD (hyperlipidemia)  No significant past surgical history    MEDICATIONS  (STANDING):  sodium chloride 0.9% Bolus 1000 milliLiter(s) IV Bolus once    Allergies  No Known Allergies  Intolerances    VITAL SIGNS:  Vital Signs Last 24 Hrs  T(C): 36.5 (12 Aug 2019 20:16), Max: 36.6 (12 Aug 2019 19:35)  T(F): 97.7 (12 Aug 2019 20:16), Max: 97.9 (12 Aug 2019 19:35)  HR: 77 (12 Aug 2019 21:20) (72 - 89)  BP: 162/70 (12 Aug 2019 21:20) (150/90 - 166/68)  BP(mean): --  RR: 16 (12 Aug 2019 21:20) (16 - 18)  SpO2: 97% (12 Aug 2019 21:20) (97% - 100%)    PHYSICAL EXAMINATION:  General: elderly, NAD  Eyes: Conjunctiva and sclera clear.  Neck: Supple, nontender  Lung: no respiratory distress noted  Neurologic:  - Mental Status:  somnolent but wake to name. oriented to self only; month May, year 2018 and president [place], mild-moderate fluent aphasia with Phonemic paraphasic errors, repeition impaired but naming preserved  - Cranial Nerves II-XII:  VFF, No nystagmus or APD noted, EOMI, PERRLA, V1-V3 intact, no facial asymmetry, t/p midline,  - Motor:  Strength is 5/5 throughout except 4-/5 in b/l LE due to knee pain.  There is no pronator drift.  Normal muscle bulk and tone throughout. No myoclonus or tremor.  - Sensory:  Intact to light touch throughout ( grossly)  - Coordination:  Finger-nose-finger without dysmetria.        LABS:                          11.8   11.0  )-----------( 219      ( 12 Aug 2019 22:04 )             33.5     08-12    121<L>  |  84<L>  |  23  ----------------------------<  135<H>  4.3   |  22  |  1.29    Ca    9.5      12 Aug 2019 22:04    TPro  7.2  /  Alb  4.4  /  TBili  0.9  /  DBili  x   /  AST  25  /  ALT  19  /  AlkPhos  68  08-12    PT/INR - ( 12 Aug 2019 22:04 )   PT: 11.2 sec;   INR: 0.97 ratio         PTT - ( 12 Aug 2019 22:04 )  PTT:27.1 sec    RADIOLOGY & ADDITIONAL STUDIES:      < from: CT Head No Cont (08.12.19 @ 18:49) >  IMPRESSION:    1)  hemorrhagic contusions are noted in the right temporal lobe and in   the inferior frontal lobes right greater than left. In conjunction there   is underlying scattered chronic ischemic changes in both hemispheres with   volume loss. 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  2)  there is localized extracerebral blood in the right temporal fossa   and inferior anterior cranial fossa, but there is no large subdural or   epidural collection. However interval reassessment and follow-up is   recommended.    < end of copied text >

## 2019-08-13 NOTE — CONSULT NOTE ADULT - ASSESSMENT
89 year old  lady with Hx of HTN and Afib on Eliquis (pt and daughter deny patient is on aspirin) who presents as a transfer from [facility] post head trauma secondary to fall which occurred yesterday as missed the toilet seat.    Impression: Confusion likely emanating from mild-mod fluent aphasia possibly from local effect of lesion affecting R temporal lobs or focal seizure w/o impairment of awareness in the setting traumatic hemorrhagic contusion with scattered SAH.    [x] loaded with 1g Keppra - please maintain with 500mg BID empirical PPx given location of lesion and SAH  [] consider NSCU eval for close monitoring and hyponatremia  [] NeuroSx eval   [] Strict BP control goal <160/90 w/ Cardene drip if needed  [] Repeat CTH in 24hrs or PRN for worsening mental status or neuro exam  [] MRI Brain WAW faustino  [] hold ASA, lovenox, eliquis/AC use mechanical DVT prophylaxis for now  [] aspiration /fall /seizure precautions  [] Telemetry Monitoring  [] HgA1c, Lipid profile  [] PT/OT 89 year old  lady with Hx of HTN and Afib on Eliquis (pt and daughter deny patient is on aspirin) who presents as a transfer from [facility] post head trauma secondary to fall which occurred yesterday as missed the toilet seat.    Impression: Confusion likely emanating from mild-mod fluent aphasia possibly from local effect of lesion affecting R temporal lobs or focal seizure w/o impairment of awareness in the setting traumatic hemorrhagic contusion with scattered SAH.    [x] loaded with 1g Keppra - please maintain with 500mg BID empirical PPx given location of lesion and SAH  [] consider NSCU eval for close monitoring and hyponatremia  [] NeuroSx eval   [] Strict BP control goal <160/90 w/ Cardene drip if needed  [] Repeat CTH in 24hrs or PRN for worsening mental status or neuro exam  [] MRI Brain WAW faustino  [] hold ASA, lovenox, eliquis/AC use mechanical DVT prophylaxis for now - defer to cardiology to consider Watchman device  [] aspiration /fall /seizure precautions  [] Telemetry Monitoring  [] HgA1c, Lipid profile  [] PT/OT 89 year old  lady with Hx of HTN and Afib on Eliquis (pt and daughter deny patient is on aspirin) who presents as a transfer from [facility] post head trauma secondary to fall which occurred yesterday as missed the toilet seat.    Impression: Confusion likely emanating from mild-mod fluent aphasia possibly from local effect of lesion affecting R temporal lobs or focal seizure w/o impairment of awareness in the setting traumatic hemorrhagic contusion with scattered SAH and significant hyponatremia.    [x] loaded with 1g Keppra - please maintain with 500mg BID empirical PPx given location of lesion and SAH  [] consider NSCU eval for close monitoring and hyponatremia  [] NeuroSx eval   [] Strict BP control goal <160/90 w/ Cardene drip if needed  [] Repeat CTH in 24hrs or PRN for worsening mental status or neuro exam  [] MRI Brain WAW faustino  [] correct electrolytes  [] hold ASA, lovenox, eliquis/AC use mechanical DVT prophylaxis for now - defer to cardiology to consider Watchman device  [] aspiration /fall /seizure precautions  [] Telemetry Monitoring  [] HgA1c, Lipid profile  [] PT/OT 89 year old  lady with Hx of HTN and Afib on Eliquis (pt and daughter deny patient is on aspirin) who presents as a transfer from [facility] post head trauma secondary to fall which occurred yesterday as missed the toilet seat.    Impression: Confusion likely emanating from mild-mod fluent aphasia possibly from local effect of lesion affecting R temporal lobs or focal seizure w/o impairment of awareness in the setting traumatic hemorrhagic contusion with scattered SAH and significant hyponatremia.    [x] loaded with 1g Keppra - maintain with 500mg BID empirical PPx given location of lesion and SAH   - IV Ativan 2mg PRN for GTC seizure > 2minutes  [] consider NSCU eval for close monitoring and hyponatremia  [] NeuroSx eval   [] Strict BP control goal <160/90 w/ Cardene drip if needed  [] Repeat CTH in 24hrs or PRN for worsening mental status or neuro exam  [] MRI Brain WAW faustino  [] correct electrolytes  [] hold ASA, lovenox, eliquis/AC use mechanical DVT prophylaxis for now - defer to cardiology to consider Watchman device  [] aspiration /fall /seizure precautions  [] Telemetry Monitoring  [] HgA1c, Lipid profile  [] PT/OT

## 2019-08-13 NOTE — H&P ADULT - PROBLEM SELECTOR PLAN 5
- History of HTN. Takes atenolol 25 mg PO BID, amlodipine 5 mg PO QD and lisinopril 10 mg PO QD at home  - SBP 160s in ED.  - Will continue with atenolol as above. Will also continue with amlodipine 5 mg PO QD  - Will hold off ACEI in the setting of hyponatremia.

## 2019-08-13 NOTE — ED ADULT NURSE REASSESSMENT NOTE - NS ED NURSE REASSESS COMMENT FT1
[facility] room called because the pt has a six second run of arterial tachycardia to the 140's. Pt denies any discomfort and was completely A&Ox2 (her baseline) after this episode. Will continue to monitor pt.

## 2019-08-13 NOTE — CONSULT NOTE ADULT - ASSESSMENT
This is a 89 y/o woman with a medical history of Afib on Eliquis, who had a mechanical fall with head trauma however without LOC.  Pt with R temporal contusion and stable repeat head ct     -No acute Trauma surgical intervention or further imaging warranted  -Care as per primary team  -D/w Dr. Carbajal

## 2019-08-13 NOTE — CONSULT NOTE ADULT - ATTENDING COMMENTS
Patient seen and examined.  Agree with resident note as above.  Patient with hx as noted including HTN HLD hypothy who presented after a fall and found to have temporal contusion with associated small SAH.  Also found to have hypoNa of unclear etiology - no obvious culprits on med list.  Will check TSH given hypothyroid.  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  Currently pt is easily arousable, pleasant, can answer all questions, nonfocal.  Recs as above and I have edited as appropriate - trend BMPq6h, free water restrict, consider renal consult and NaCL 2% infusion.  Management of contusion/SAH as per NSGY.

## 2019-08-13 NOTE — CONSULT NOTE ADULT - SUBJECTIVE AND OBJECTIVE BOX
[facility] DIVISION OF KIDNEY DISEASES AND HYPERTENSION -- INITIAL CONSULT NOTE  --------------------------------------------------------------------------------  HPI: Patient is a 89F w/ pmh HTN, HLD, hypothyroidism, and afib on eliquis who presents s/p fall with head trauma on 8/11. Pt went to bed following fall. Daughter noticed over the phone that pt appeared to be confused, took pt to urgent care, where she received staples for an occipital laceration, and then was sent to [place]. CT head at [place] demonstrated hemorrhagic right temporal contusion w/ scattered SAH, pt was given KCentra and transferred to [facility]. Pt endorses HA w/o vision changes, also endorses nausea but no vomiting. Daughter states that pt has been acting confused since last week. At baseline, patient is independent, lives alone and is able to walk with out a walker and care for herself. No known history of previous stroke.      PAST HISTORY  --------------------------------------------------------------------------------  PAST MEDICAL & SURGICAL HISTORY:  HTN (hypertension)  HLD (hyperlipidemia)  No significant past surgical history    FAMILY HISTORY:    PAST SOCIAL HISTORY:    ALLERGIES & MEDICATIONS  --------------------------------------------------------------------------------  Allergies    No Known Allergies    Intolerances      Standing Inpatient Medications    PRN Inpatient Medications      REVIEW OF SYSTEMS  --------------------------------------------------------------------------------  Gen: No fevers/chills  Head/Eyes/Ears/Mouth: headache, worse posteriorly; difficulty hearing (at baseline); Normal vision    Respiratory: No dyspnea, cough  CV: No chest pain  GI: No abdominal pain, diarrhea, constipation, nausea, vomiting  : No increased frequency, dysuria  MSK: No joint pain/swelling; no edema    All other systems were reviewed and are negative, except as noted.    VITALS/PHYSICAL EXAM  --------------------------------------------------------------------------------  T(C): 36.7 (08-13-19 @ 10:53), Max: 37.1 (08-13-19 @ 07:12)  HR: 73 (08-13-19 @ 10:53) (71 - 89)  BP: 160/77 (08-13-19 @ 10:53) (150/90 - 169/80)  RR: 18 (08-13-19 @ 10:53) (16 - 18)  SpO2: 95% (08-13-19 @ 10:53) (95% - 100%)  Wt(kg): --  Height (cm): 160.02 (08-12-19 @ 20:16)  Weight (kg): 59 (08-12-19 @ 20:16)  BMI (kg/m2): 23 (08-12-19 @ 20:16)  BSA (m2): 1.61 (08-12-19 @ 20:16)      Physical Exam:    	Gen: NAD, lying in bed, aox2  	HEENT: PERRL, supple neck, clear oropharynx  	Pulm: CTA B/L, no rrw  	CV: irregularly irregular, s1 s2 auscultated  	Abd: +BS, soft, nontender/nondistended       	: No suprapubic tenderness  	LE: Warm, no clubbing, intact strength; no edema  	Neuro: CN 2-12 intact, no facial droop, strength 5/5 in UE and LE b/L, difficulty following some commands, coordination intact      	Vascular Access: peripheral lines      LABS/STUDIES  --------------------------------------------------------------------------------              11.8   11.0  >-----------<  219      [08-12-19 @ 22:04]              33.5     127  |  91  |  13  ----------------------------<  102      [08-13-19 @ 10:45]  4.1   |  24  |  0.79        Ca     8.6     [08-13-19 @ 10:45]    TPro  7.2  /  Alb  4.4  /  TBili  0.9  /  DBili  x   /  AST  25  /  ALT  19  /  AlkPhos  68  [08-12-19 @ 22:04]    PT/INR: PT 11.2 , INR 0.97       [08-12-19 @ 22:04]  PTT: 27.1       [08-12-19 @ 22:04]    Serum Osmolality 264      [08-13-19 @ 04:39]    Creatinine Trend:  SCr 0.79 [08-13 @ 10:45]  SCr 1.00 [08-13 @ 04:39]  SCr 1.29 [08-12 @ 22:04]  SCr 1.74 [08-12 @ 17:58]      Urine Sodium 75      [08-13-19 @ 01:52]  Urine Osmolality 575      [08-13-19 @ 01:52]

## 2019-08-13 NOTE — CONSULT NOTE ADULT - CONSULT REASON
Neuro checks
Mechanical Fall on Eliquis
R temporal contusion s/p fall yesterday
R temporal traumatic SAH
hyponatremia, AMS

## 2019-08-13 NOTE — CONSULT NOTE ADULT - PROBLEM SELECTOR RECOMMENDATION 9
-acute on chronic hyponatremia improving on IV fluids. Pt has had hyponatremia in past based on clinic lab values.   -d/c IV fluids, Na is now 127. Goal of Na to 128 today. If Na far exceeds 128, give d5w  -avoid using hypertonic saline.  -CMP q6  -repeat urine osmole for AM labs   -serum uric acid level  -allow pt to drink water PO

## 2019-08-13 NOTE — CONSULT NOTE ADULT - ATTENDING COMMENTS
#Acute on Chronic Hyponatremia- likely hypovolemic on admission and now component of SIADH (neurologic issues SDH)  -repeat u osm and u na high  -start salt tabs 1g bid   -monitor sodium q6hr stat and call if downtrends or if it uptrend rapidly; plan for today is to keep sodium target at approx 127-128  -check u acid,   #lamar- on admission in the setting of ace/volume depletion  -hold ace  -lamar improved  #htn- restart bp meds  #neuro follow up #Acute on Chronic Hyponatremia- likely hypovolemic on admission and now component of SIADH (neurologic issues SDH)  -repeat u osm and u na high  -start salt tabs 1g bid   -monitor sodium q6hr stat and call if downtrends or if it uptrend rapidly; plan for today is to keep sodium target at approx 127-128  -check u acid,   #lamar- on admission in the setting of ace/volume depletion  -hold ace  -lamar improved  #htn- restart bp meds  #neuro follow up  #check ua, renal sono- daughter states pt has lesion on her kidneys

## 2019-08-13 NOTE — CONSULT NOTE ADULT - ASSESSMENT
Forest Fransisca  89F on eliquis s/p fall yesterday brought in for mild confusion (lives alone at baseline) found to have R temporal contusion. AOx2-3 otherwise non focal  - Discussed with family risks / limited benefits of surgical intervention if the bleeding was to expand, Pt and daughter would not want surgery  - Hold Eliquis for at least 2 weeks. Recommend considering risks / benefits of stroke risk reduction vs bleeding risk before restarting  - No need for q1h neurochecks as patient is non-surgical  - keppra 500mg BID x7 days  - No further neurosurgical intervention at this time

## 2019-08-13 NOTE — CONSULT NOTE ADULT - PROBLEM SELECTOR RECOMMENDATION 3
- Cr 1.74 on initial presentation, baseline around 1.2 (CKD stage 3)  - Creatinine now 0.79 s/p 3 L NS.  - Renal sonogram  - Continue to trend BMP.  - Check CK level, pt was down for unknown amount of time

## 2019-08-13 NOTE — CONSULT NOTE ADULT - PROBLEM SELECTOR PROBLEM 3
OUTPATIENT PROGRESS NOTE  TRANSITIONAL CARE MANAGEMENT - HOSPITAL DISCHARGE FOLLOW-UP      CHIEF COMPLAINT  Transitional Care Management (Hospital Discharge Follow-Up) and Anticoagulation (review INR)      Mr. Neumann is accompanied today by his daughter in law.    SUBJECTIVE   The patient was discharged from the hospital on 5/3/19. The Discharge Summary was reviewed. It documents that the patient was hospitalized for (dyspnea on exertion), Chest pain, exertional. Stress test was negative. Chest CT was negative for PE. There was evidence of interstitial fibrosis for which she was seen by pulmonology. He is being continued on DuoNeb hand-held nebulizer as well as Advair b.i.d. His blood pressure was running low, lisinopril was discontinued. He feels much better without this medication. His shortness of breath has improved. He is not coughing. No sputum production.    He is on warfarin 2.5 mg daily. INR yesterday 1.2. No dietary changes. Has not missed any doses.    Pertinent un-finalized hospital performed diagnostic tests - not applicable..    Pertinent un-finalized hospital lab tests - not applicable.    Advance Directives:  · Patient has an Advance Directives document, which is on file    Durable Medical Equipment/Assistive devices prescribed: None     MEDICATIONS  The discharge medication list was reviewed. Outpatient medications were updated today. He is fully compliant with the medication regimen prescribed at the time of discharge.    HISTORIES  I have personally reviewed and updated the following electronic medical record sections: Past Medical History.    REVIEW OF SYSTEMS  GENERAL: denies fever, chills, night sweats, fatigue, weight loss and weight gain  CARDIORESPIRATORY: denies chest pain, palpitations, fast heart rate, edema, cough, hemoptysis, wheeze, shortness of breath, sputum, paroxysmal nocturnal dyspnea and orthopnea  MUSCULOSKELETAL: denies joint stiffness, joint pain, joint swelling, muscle pain,  stiff neck and back pain    PHYSICAL EXAM  Visit Vitals  /60 (BP Location: [facility], Patient Position: Sitting, Cuff Size: Regular)   Pulse 76   Resp 16   Ht 5' 9\" (1.753 m)   Wt 66.2 kg   BMI 21.55 kg/m²     General appearance: alert, appears stated age, cooperative and no distress  Lungs: clear to auscultation bilaterally  Heart: regular rate and rhythm, S1, S2 normal, no murmur, click, rub or gallop  Extremities: no edema, redness or tenderness in the calves or thighs      ASSESSMENT  1. Chronic systolic congestive heart failure (CMS/HCC)    2. COPD, severe (CMS/HCC)    3. Essential hypertension    4. Warfarin anticoagulation        PLAN  1. Continue current medications for hypertension, CHF including carvedilol 3.125 mg b.i.d., Lasix 40 mg b.i.d. He was taken off potassium while hospitalized due to mildly elevated creatinine. Recheck creatinine next week, and if necessary, we can restart the potassium.    2. Change dose of warfarin 5 mg on Tuesday and Thursday, and 2.5 mg on Sunday, Monday, Wednesday, Friday, Saturday. Recheck INR one week.    3. Continue Advair discus 250/50 one puff b.i.d. and DuoNeb hand-held nebulizer q.i.d. p.r.n.        Patient adherence to his treatment plan was assessed. He is   fully compliant with the entire discharge treatment plan. Patient was seen for a detailed history, detailed examination, medical decision making of moderate complexity .    Km Mcpherson M.D.   Acute on chronic kidney failure

## 2019-08-13 NOTE — CONSULT NOTE ADULT - SUBJECTIVE AND OBJECTIVE BOX
HPI:  This is a 89 year old woman with amedical history of HTN, afib on Eliquis who initially presented to [place] status post head trauma secondary to fall which occurred Sunday. Pt states she ambulated tot he restroom and upon sitting down on the toilet, she misjudged the distance and hit the back of her head on the toilet.  She denies LOC. Pt went to bed afterwards and when her daughter called her the next moring, she noticed that her mother had an altered mental status and confused and went to urgent care who then sent the patient to [place] for further evaluation.  Imaging there revealed:   	1)  hemorrhagic contusions are noted in the right temporal lobe and in the inferior frontal lobes right greater than left. In conjunction there is underlying scattered chronic ischemic changes in both hemispheres with volume loss. 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  	2)  there is localized extracerebral blood in the right temporal fossa and inferior anterior cranial fossa, but there is no large subdural or epidural collection.   	They administered KCentra and transferred to [facility]. Daughter says that mom is still acting confused. Patient endorses some nausea but no vomiting. Endorses some head pain. Patient states that she began to feel funny after hitting her head Sunday. Denies vision changes. Of note, daughter noticed last week, that patient was acting confused.    GCS: 15  Primary survey : Airway intact, breathing unlabored, bilateral breath sounds; Vital signs stable with 2 large bore IV's established  Secondary survey remarkable for occiput laceration closed with staples. slight abrasion to posterior superior thoracic spine, (+)right lateral anterior cervical lymphadenopathy      No Known Allergies      PAST MEDICAL & SURGICAL HISTORY:  HTN (hypertension)  HLD (hyperlipidemia)  No significant past surgical history        Home Medications:  amLODIPine:  orally  (24 Aug 2017 22:20)  Aspir 81:  orally  (24 Aug 2017 22:20)  atenolol:  orally  (24 Aug 2017 22:20)  atorvastatin:  orally  (24 Aug 2017 22:20)  levothyroxine:  orally  (24 Aug 2017 22:20)  lisinopril:  orally  (24 Aug 2017 22:20)      Social History:  ETOH: Denies  TOB: denies  Illicits: denies  Work/Home: Live alone, fully independent    FAMILY HISTORY:      REVIEW OF SYSTEMS:    A ten point review of systems was otherwise negative.          MEDICATIONS  (STANDING):    MEDICATIONS  (PRN):        Vital Signs Last 24 Hrs  T(C): 37.1 (13 Aug 2019 07:12), Max: 37.1 (13 Aug 2019 07:12)  T(F): 98.7 (13 Aug 2019 07:12), Max: 98.7 (13 Aug 2019 07:12)  HR: 71 (13 Aug 2019 07:12) (71 - 89)  BP: 169/80 (13 Aug 2019 07:12) (150/90 - 169/80)  BP(mean): --  RR: 18 (13 Aug 2019 07:12) (16 - 18)  SpO2: 96% (13 Aug 2019 07:12) (96% - 100%)    General: NAD, Pleasant  Neurology: Patient is AA&Ox4, follows commands, and speech fluent. Hard of hearing; EOMI intact, PERRLA, 3mm--2mm. Sensation in the Trigeminal distribution is wnl and symmetrical. Facial muscles intact and symmetrical. Uvula rises equally upon phonation and tongue protrudes symmetrically. SCM/Trapezius 5/5 power.  Neck: Neck supple, trachea midline, No JVD  Respiratory: Clear to anterior chest wall; slightly decreased to right base  CV: S1S2, r/r/r, (-)m/r/g  Abdomen: S/NT/ND, BSx4  Extremities: 2+ peripheral pulses bilat throughout; (-)edema appreciated  Skin: No Rashes, Hematoma, Ecchymosis    LABS:                        11.8   11.0  )-----------( 219      ( 12 Aug 2019 22:04 )             33.5     08-13    125<L>  |  91<L>  |  17  ----------------------------<  114<H>  4.1   |  22  |  1.00    Ca    9.0      13 Aug 2019 04:39    TPro  7.2  /  Alb  4.4  /  TBili  0.9  /  DBili  x   /  AST  25  /  ALT  19  /  AlkPhos  68  08-12    PT/INR - ( 12 Aug 2019 22:04 )   PT: 11.2 sec;   INR: 0.97 ratio         PTT - ( 12 Aug 2019 22:04 )  PTT:27.1 sec        RADIOLOGY & ADDITIONAL STUDIES:  < from: CT Head No Cont (08.12.19 @ 21:02) >  FINDINGS:     Hemorrhagic contusion in the right temporal lobe with surrounding areas   of scattered subarachnoid hemorrhage is without significant change.   Inferior right frontal hemorrhage in the region of the gyrus rectus also   appears grossly unchanged allowing for differences in head positioning   and slice angulation. There is questionable hemorrhage in the medial   inferior left frontal lobe as well. Focus of subarachnoid hemorrhage in   the left anterior middle cranial fossa (series 3 image 15) is stable. No   new areas of hemorrhage are seen. No evidence for acute large vascular   territory infarct. Stable small chronic appearing infarcts in the medial   left occipital and posterior left temporal lobe and PCA distribution.   Additional patchy areas of low-attenuation bihemispheric white matter are   nonspecific but likely scalloped mild chronic microvascular change.    There is no midline shift. There is no hydrocephalus.    The calvarium is intact. The soft tissues of the head are unremarkable.    Maxillary mucosal thickening and partial opacification within the   bilateral mastoid air cells and left sphenoid sinus comment unchanged.    IMPRESSION:     Stable exam. Inferior frontal and anterior right temporal lobe   hemorrhagic contusions and scattered subarachnoid hemorrhage as above. No   new hemorrhage or midline shift.    < end of copied text >    < from: CT Head No Cont (08.12.19 @ 18:49) >  FINDINGS:      HEMISPHERES: There is a hemorrhagic contusion in the right temporal lobe.   There is a hematoma which measures 2.4 cm, in conjunction with areas of   more punctate hemorrhage. There is also a inferior right frontal   hemorrhage in the region of the gyrus rectus. Underlying chronic ischemic   changes are scattered within both hemispheres with volume loss. There is   an old left PCA territory infarct. VENTRICLES:  Midline and normal in   size.  POSTERIOR FOSSA:  The brain stem and cerebellum are unremarkable.  No CP   angle lesion noted.  EXTRACEREBRAL SPACES:  There is hemorrhage in the subfrontal regionas   well as the right temporal fossa. There is no large collection.    Scattered subarachnoid hemorrhage is noted.  SKULL BASE AND CALVARIUM:  Appears intact.  No fracture or destructive   lesion is identified.  SINUSES AND MASTOIDS:  Pansinus mucosal disease is noted. There is also   bilateral mastoid partial opacification.   MISCELLANEOUS:  No orbital or suprasellar abnormality noted.      IMPRESSION:    1)  hemorrhagic contusions are noted in the right temporal lobe and in   the inferior frontal lobes right greater than left. In conjunction there   is underlying scattered chronic ischemic changes in both hemispheres with   volume loss. 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  2)  there is localized extracerebral blood in the right temporal fossa   and inferior anterior cranial fossa, but there is no large subdural or   epidural collection. However interval reassessment and follow-up is   recommended.

## 2019-08-13 NOTE — ED ADULT NURSE REASSESSMENT NOTE - NS ED NURSE REASSESS COMMENT FT1
Received report from DAWSON Bee. Pt A&ox2, (confused about who the president is as well as the month but knew the year and answered all other questions correctly). Pt is receiving IV fluids and tolerating it well. Pt denies any pain or discomfort. Pt is tired and states "I am going back to sleep." Pt is awaiting a bed on the floor. Pt is outside of the nurses station will red fall risk socks on. Will continue to monitor pt.

## 2019-08-13 NOTE — CONSULT NOTE ADULT - ASSESSMENT
89 yo woman w/ afib on eliquis presenting s/p fall foundw/ R temporal contusion. AOx2-3 otherwise non focal.  MICU consulted for neuro checks.    - CT head without   - No need for q1h neurochecks as patient is non-surgical 89 yo woman w/ afib on eliquis presenting s/p fall foundw/ R temporal contusion. AOx2-3 otherwise non focal.  MICU consulted for neuro checks.    AMS:   - Etiology unclear, likely 2/2   - CTH demonstrates stable exam w/o hemorrhagic contusions. No new hemorrhage or midline shift.  - No need for q1h neurochecks per neurosurgery as patient is non-surgical    Hyponatremia  - Likely 2/2 SIADH. Pt euvolemic on exam with Urine Na >40, Urine Osm >100.  - Recommend fluid restriction, q6hr BMP, restrict correction to <0.5meq/hr    Mark Hellerman PGY2 89 yo woman w/ afib on eliquis presenting s/p fall foundw/ R temporal contusion. AOx2-3 otherwise non focal.  MICU consulted for neuro checks.    SDH   - CTH demonstrates stable exam w/o hemorrhagic contusions. No new hemorrhage or midline shift.  - No need for q1h neurochecks per neurosurgery as patient is non-surgical    Hyponatremia  - Likely 2/2 SIADH. Pt euvolemic on exam with Urine Na >40, Urine Osm >100.  - Recommend fluid restriction, q6hr BMP, restrict correction to <0.5meq/hr  - Pt will likely benefit from 2% hypertonic saline    Mark Hellerman PGY2 89 yo woman w/ afib on eliquis presenting s/p fall foundw/ R temporal contusion. AOx2-3 otherwise non focal.  MICU consulted for neuro checks.    SDH   - CTH demonstrates stable exam w/o hemorrhagic contusions. No new hemorrhage or midline shift.  - No need for q1h neurochecks per neurosurgery as patient is non-surgical  - avoid coagulopathy    Hyponatremia  - Likely 2/2 SIADH. Pt euvolemic on exam with Urine Na >40, Urine Osm >100.  - Recommend fluid restriction, q6hr BMP, restrict correction to <0.5meq/hr  - Pt will likely benefit from 2% hypertonic saline (renal to consult and will defer to their judgment)    Mark Hellerman PGY2

## 2019-08-13 NOTE — CONSULT NOTE ADULT - ASSESSMENT
89F w/ pmh HTN, HLD, hypothyroidism, and afib on eliquis who presents s/p fall with head trauma on 8/11. 89F w/ pmh HTN, HLD, hypothyroidism, and afib on eliquis who presents s/p fall with head trauma on 8/11, found to have hemorrhagic right temporal contusion with scattered SAH and hyponatremia to 121. As per daughter, pt's mental status currently below baseline after fall, but has also been worse for about one week. Sodium has risen from 121 on initial presentation to 127 on IVF (3 L NS). No plans for surgical intervention at this time.

## 2019-08-13 NOTE — CONSULT NOTE ADULT - PROBLEM SELECTOR RECOMMENDATION 2
-CTH x2 shows hemorrhagic right temporal contusion w/ scattered SAH, no midline shift. No plans for surgery at this time.

## 2019-08-13 NOTE — CONSULT NOTE ADULT - SUBJECTIVE AND OBJECTIVE BOX
p ([id number])     HPI:  89 year old female with HTN and afib on Eliquis (pt and daughter deny patient is on aspirin) who presents as a transfer from [facility] post head trauma secondary to fall which occurred yesterday. Patient lives alone and is completely independent. Following fall, patient went to bed. Today, daughter was speaking with patient by phone and noticed that her mother was confused. Daughter then took patient to urgent care where they sent her to [facility].     Imaging:    Exam:  AOx2-3, FC, PERRL  5/5 throughout, no drift    --Anticoagulation:    =====================  PAST MEDICAL HISTORY   HTN (hypertension)  HLD (hyperlipidemia)    PAST SURGICAL HISTORY   No significant past surgical history        MEDICATIONS:  Antibiotics:    Neuro:    Other:  sodium chloride 0.9% Bolus 1000 milliLiter(s) IV Bolus once      SOCIAL HISTORY:   Occupation:   Marital Status:     FAMILY HISTORY:      ROS: Negative except per HPI    LABS:  PT/INR - ( 12 Aug 2019 22:04 )   PT: 11.2 sec;   INR: 0.97 ratio         PTT - ( 12 Aug 2019 22:04 )  PTT:27.1 sec                        11.8   11.0  )-----------( 219      ( 12 Aug 2019 22:04 )             33.5     08-12    121<L>  |  84<L>  |  23  ----------------------------<  135<H>  4.3   |  22  |  1.29    Ca    9.5      12 Aug 2019 22:04    TPro  7.2  /  Alb  4.4  /  TBili  0.9  /  DBili  x   /  AST  25  /  ALT  19  /  AlkPhos  68  08-12

## 2019-08-13 NOTE — CONSULT NOTE ADULT - ATTENDING COMMENTS
I personally interviewed, examined, and participated in the care of this patient, on rounds 8/13/2019 with the neurology house staff.  Discussed findings and management plan with neurology house staff.  In addition to or instead of the Hx and findings and plan reported above I note as follow:    Somnolent; does not stay awake without continual stimulation.  Grossly full-range horizontal pursuit and upgaze pursuit; at least partial downgaze pursuit.      Normal muscle tonus.  Very limited examination of limb motor function due to somnolence and Pt being on stretcher.       Reflex                           Right    Left       Biceps                            3         2  Triceps                          0?         0?  Hip add                         0?         0?  Infrapatellar                    3+        3  Gastroc                          0          0  Plantar                        extensor extensor    She has had significant head trauma.  In this setting, and she should have a c-spine CT.  Can consider lateron whether c-spine MRI indicated.

## 2019-08-13 NOTE — CHART NOTE - NSCHARTNOTEFT_GEN_A_CORE
Pt with worsening hyponatremia. Will start on 1.5% NS at 70 cc/hr x 3 hrs and repeat BMP q 4hrs. Will fluid restrict as well. Please check bladder scan to make sure patient is not retaining urine. Urine Osm consistent with SIADH. Goal correction is not more than 6 meq in 24hrs, if correcting too quickly please call the on-call nephrology fellow.    Dakota Brown   Nephrology Fellow  Pager NS: [phone]/ [facility]: [phone]  (After 5 pm or on weekends please page the on-call fellow)

## 2019-08-13 NOTE — H&P ADULT - NSHPPHYSICALEXAM_GEN_ALL_CORE
Vital Signs Last 24 Hrs  T(C): 36.8 (13 Aug 2019 11:43), Max: 37.1 (13 Aug 2019 07:12)  T(F): 98.3 (13 Aug 2019 11:43), Max: 98.7 (13 Aug 2019 07:12)  HR: 76 (13 Aug 2019 11:43) (71 - 89)  BP: 169/75 (13 Aug 2019 11:43) (150/90 - 169/80)  BP(mean): --  RR: 18 (13 Aug 2019 11:43) (16 - 18)  SpO2: 96% (13 Aug 2019 11:43) (95% - 100%)    PHYSICAL EXAM:  GENERAL: NAD, well-groomed, well-developed  HEAD: +occipital laceration repaired with surgical staples, mildly erythematous, mildly tender, no pus expression.  EYES: EOMI, PERRLA, conjunctiva and sclera clear  ENMT: No tonsillar erythema, exudates, or enlargement; Moist mucous membranes, Good dentition  NECK: Supple, No JVD; symmetric goiter appreciated  NERVOUS SYSTEM: AOX2 (self, season and year, and home), motor and sensation grossly intact in b/l UE and b/l LE  PSYCHIATRIC: Appropriate affect and mood  CHEST/LUNG: Clear to auscultation bilaterally; No rales, rhonchi, wheezing, or rubs  HEART: Regular rate and rhythm; No murmurs, rubs, or gallops. No LE edema  ABDOMEN: Soft, Nontender, Nondistended; Bowel sounds present  EXTREMITIES:  2+ Peripheral Pulses, No clubbing, cyanosis  SKIN: No rashes or lesions

## 2019-08-13 NOTE — CONSULT NOTE ADULT - SUBJECTIVE AND OBJECTIVE BOX
CHIEF COMPLAINT:    HPI:    PAST MEDICAL & SURGICAL HISTORY:  HTN (hypertension)  HLD (hyperlipidemia)  No significant past surgical history      FAMILY HISTORY:      SOCIAL HISTORY:  Smoking: __ packs x ___ years  EtOH Use:  Marital Status:  Occupation:  Recent Travel:  Country of Birth:  Advance Directives:    Allergies    No Known Allergies    Intolerances        HOME MEDICATIONS:    REVIEW OF SYSTEMS:  Constitutional:   Eyes:  ENT:  CV:  Resp:  GI:  :  MSK:  Integumentary:  Neurological:  Psychiatric:  Endocrine:  Hematologic/Lymphatic:  Allergic/Immunologic:  [ ] All other systems negative  [ ] Unable to assess ROS because ________    OBJECTIVE:  ICU Vital Signs Last 24 Hrs  T(C): 36.5 (12 Aug 2019 20:16), Max: 36.6 (12 Aug 2019 19:35)  T(F): 97.7 (12 Aug 2019 20:16), Max: 97.9 (12 Aug 2019 19:35)  HR: 77 (12 Aug 2019 21:20) (72 - 89)  BP: 162/70 (12 Aug 2019 21:20) (150/90 - 166/68)  BP(mean): --  ABP: --  ABP(mean): --  RR: 16 (12 Aug 2019 21:20) (16 - 18)  SpO2: 97% (12 Aug 2019 21:20) (97% - 100%)        CAPILLARY BLOOD GLUCOSE          PHYSICAL EXAM:  General:   HEENT:   Lymph Nodes:  Neck:   Respiratory:   Cardiovascular:   Abdomen:   Extremities:   Skin:   Neurological:  Psychiatry:    HOSPITAL MEDICATIONS:  MEDICATIONS  (STANDING):    MEDICATIONS  (PRN):      LABS:                        11.8   11.0  )-----------( 219      ( 12 Aug 2019 22:04 )             33.5     08-12    121<L>  |  84<L>  |  23  ----------------------------<  135<H>  4.3   |  22  |  1.29    Ca    9.5      12 Aug 2019 22:04    TPro  7.2  /  Alb  4.4  /  TBili  0.9  /  DBili  x   /  AST  25  /  ALT  19  /  AlkPhos  68  08-12    PT/INR - ( 12 Aug 2019 22:04 )   PT: 11.2 sec;   INR: 0.97 ratio         PTT - ( 12 Aug 2019 22:04 )  PTT:27.1 sec          MICROBIOLOGY:     RADIOLOGY:  [ ] Reviewed and interpreted by me    EKG: CHIEF COMPLAINT:    HPI:  89 year old female with HTN and afib on Eliquis (pt and daughter deny patient is on aspirin) who presents as a transfer from [facility] post head trauma secondary to fall which occurred yesterday. Patient lives alone and is completely independent. Today, daughter was speaking with patient by phone and noticed that her mother was confused. Pt recalls she fell while trying to sit on the toilet and was confused afterwards and could not remember things. Daughter then took patient to urgent care where they sent her to the [facility] where a CTH showed hemorrhagic contusion in the right temporal lobe with subsequent repeat CTH at [facility] showing hemorrhagic R temporal contusion with scattered SAH.     PAST MEDICAL & SURGICAL HISTORY:  HTN (hypertension)  HLD (hyperlipidemia)  No significant past surgical history      FAMILY HISTORY:      SOCIAL HISTORY:  Smoking: never  EtOH Use: never  Occupation: retired    Allergies    No Known Allergies    Intolerances        HOME MEDICATIONS:    REVIEW OF SYSTEMS:  Constitutional: NAD  Eyes:  ENT:  CV:  Resp:  GI:  :  MSK:  Integumentary:  Neurological:  Psychiatric:  Endocrine:  Hematologic/Lymphatic:  Allergic/Immunologic:  [ ] All other systems negative  [ ] Unable to assess ROS because ________    OBJECTIVE:  ICU Vital Signs Last 24 Hrs  T(C): 36.5 (12 Aug 2019 20:16), Max: 36.6 (12 Aug 2019 19:35)  T(F): 97.7 (12 Aug 2019 20:16), Max: 97.9 (12 Aug 2019 19:35)  HR: 77 (12 Aug 2019 21:20) (72 - 89)  BP: 162/70 (12 Aug 2019 21:20) (150/90 - 166/68)  BP(mean): --  ABP: --  ABP(mean): --  RR: 16 (12 Aug 2019 21:20) (16 - 18)  SpO2: 97% (12 Aug 2019 21:20) (97% - 100%)        CAPILLARY BLOOD GLUCOSE          PHYSICAL EXAM:  General:   HEENT:   Lymph Nodes:  Neck:   Respiratory:   Cardiovascular:   Abdomen:   Extremities:   Skin:   Neurological:  Psychiatry:    HOSPITAL MEDICATIONS:  MEDICATIONS  (STANDING):    MEDICATIONS  (PRN):      LABS:                        11.8   11.0  )-----------( 219      ( 12 Aug 2019 22:04 )             33.5     08-12    121<L>  |  84<L>  |  23  ----------------------------<  135<H>  4.3   |  22  |  1.29    Ca    9.5      12 Aug 2019 22:04    TPro  7.2  /  Alb  4.4  /  TBili  0.9  /  DBili  x   /  AST  25  /  ALT  19  /  AlkPhos  68  08-12    PT/INR - ( 12 Aug 2019 22:04 )   PT: 11.2 sec;   INR: 0.97 ratio         PTT - ( 12 Aug 2019 22:04 )  PTT:27.1 sec          MICROBIOLOGY:     RADIOLOGY:  [ ] Reviewed and interpreted by me    EKG: CHIEF COMPLAINT:    HPI:  89 year old female with HTN and afib on Eliquis (pt and daughter deny patient is on aspirin) who presents as a transfer from [facility] post head trauma secondary to fall which occurred yesterday. Patient lives alone and is completely independent. Today, daughter was speaking with patient by phone and noticed that her mother was confused. Pt recalls she fell while trying to sit on the toilet and was confused afterwards and could not remember things. Daughter then took patient to urgent care where they sent her to the [facility] where a CTH showed hemorrhagic contusion in the right temporal lobe with subsequent repeat CTH at [facility] showing hemorrhagic R temporal contusion with scattered SAH.     PAST MEDICAL & SURGICAL HISTORY:  HTN (hypertension)  HLD (hyperlipidemia)  No significant past surgical history      FAMILY HISTORY:  no sig fam hx    SOCIAL HISTORY:  Smoking: never  EtOH Use: never  Occupation: retired    Allergies    No Known Allergies    Intolerances        HOME MEDICATIONS:  lisinopril:  orally   amLODIPine:  orally   levothyroxine:  orally   latenolol:  orally   atorvastatin:  orally   Aspir 81:  orally     REVIEW OF SYSTEMS:  Constitutional: NAD  Eyes: No change in vision   CV: no chest pain or palpitations  Resp: no SOB  GI: mild lower abd pain  : no pain or burning with urination   MSK: no msk pain     OBJECTIVE:  ICU Vital Signs Last 24 Hrs  T(C): 36.5 (12 Aug 2019 20:16), Max: 36.6 (12 Aug 2019 19:35)  T(F): 97.7 (12 Aug 2019 20:16), Max: 97.9 (12 Aug 2019 19:35)  HR: 77 (12 Aug 2019 21:20) (72 - 89)  BP: 162/70 (12 Aug 2019 21:20) (150/90 - 166/68)  RR: 16 (12 Aug 2019 21:20) (16 - 18)  SpO2: 97% (12 Aug 2019 21:20) (97% - 100%)        CAPILLARY BLOOD GLUCOSE          PHYSICAL EXAM:  General: NAD  HEENT: PERRL, EOMI, MMM  Lymph Nodes: no cervical or supraclavicular lymphadenopagy  Neck: supple   Respiratory: CTAB, no w/r/r  Cardiovascular: +S1/S2, no r/m/g  Abdomen: +BS, soft nontender non distend  Extremities: no TRINA  Skin: warm dry no rash   Neurological: poor hearing, rest of cranial nerves intact    HOSPITAL MEDICATIONS:  MEDICATIONS  (STANDING):    MEDICATIONS  (PRN):      LABS:                        11.8   11.0  )-----------( 219      ( 12 Aug 2019 22:04 )             33.5     08-12    121<L>  |  84<L>  |  23  ----------------------------<  135<H>  4.3   |  22  |  1.29    Ca    9.5      12 Aug 2019 22:04    TPro  7.2  /  Alb  4.4  /  TBili  0.9  /  DBili  x   /  AST  25  /  ALT  19  /  AlkPhos  68  08-12    PT/INR - ( 12 Aug 2019 22:04 )   PT: 11.2 sec;   INR: 0.97 ratio         PTT - ( 12 Aug 2019 22:04 )  PTT:27.1 sec CHIEF COMPLAINT:    HPI:  89 year old female with HTN and afib on Eliquis (pt and daughter deny patient is on aspirin) who presents as a transfer from [facility] post head trauma secondary to fall which occurred yesterday. Patient lives alone and is completely independent. Today, daughter was speaking with patient by phone and noticed that her mother was confused. Pt recalls she fell while trying to sit on the toilet and was confused afterwards and could not remember things. Daughter then took patient to urgent care where they sent her to the [facility] where a CTH showed hemorrhagic contusion in the right temporal lobe with subsequent repeat CTH at [facility] showing hemorrhagic R temporal contusion with scattered SAH.     PAST MEDICAL & SURGICAL HISTORY:  HTN (hypertension)  HLD (hyperlipidemia)  No significant past surgical history      FAMILY HISTORY:  no sig fam hx    SOCIAL HISTORY:  Smoking: never  EtOH Use: never  Occupation: retired    Allergies    No Known Allergies    Intolerances        HOME MEDICATIONS:  lisinopril:  orally   amLODIPine:  orally   levothyroxine:  orally   latenolol:  orally   atorvastatin:  orally   Aspir 81:  orally     REVIEW OF SYSTEMS:  Constitutional: NAD  Eyes: No change in vision   CV: no chest pain or palpitations  Resp: no SOB  GI: mild lower abd pain  : no pain or burning with urination   MSK: no msk pain     OBJECTIVE:  ICU Vital Signs Last 24 Hrs  T(C): 36.5 (12 Aug 2019 20:16), Max: 36.6 (12 Aug 2019 19:35)  T(F): 97.7 (12 Aug 2019 20:16), Max: 97.9 (12 Aug 2019 19:35)  HR: 77 (12 Aug 2019 21:20) (72 - 89)  BP: 162/70 (12 Aug 2019 21:20) (150/90 - 166/68)  RR: 16 (12 Aug 2019 21:20) (16 - 18)  SpO2: 97% (12 Aug 2019 21:20) (97% - 100%)        CAPILLARY BLOOD GLUCOSE          PHYSICAL EXAM:  General: NAD  HEENT: PERRL, EOMI, MMM  Lymph Nodes: no cervical or supraclavicular lymphadenopagy  Neck: supple   Respiratory: CTAB, no w/r/r  Cardiovascular: +S1/S2, no r/m/g  Abdomen: +BS, soft nontender non distend  Extremities: no TRINA  Skin: warm dry no rash   Neurological: poor hearing, rest of cranial nerves intact    HOSPITAL MEDICATIONS:  MEDICATIONS  (STANDING):    MEDICATIONS  (PRN):      LABS:                        11.8   11.0  )-----------( 219      ( 12 Aug 2019 22:04 )             33.5     08-12    121<L>  |  84<L>  |  23  ----------------------------<  135<H>  4.3   |  22  |  1.29    Ca    9.5      12 Aug 2019 22:04    TPro  7.2  /  Alb  4.4  /  TBili  0.9  /  DBili  x   /  AST  25  /  ALT  19  /  AlkPhos  68  08-12    PT/INR - ( 12 Aug 2019 22:04 )   PT: 11.2 sec;   INR: 0.97 ratio         PTT - ( 12 Aug 2019 22:04 )  PTT:27.1 sec      Radiology:  reviewed and interpreted by me.  < from: CT Head No Cont (08.12.19 @ 21:02) >  Hemorrhagic contusion in the right temporal lobe with surrounding areas   of scattered subarachnoid hemorrhage is without significant change.   Inferior right frontal hemorrhage in the region of the gyrus rectus also   appears grossly unchanged allowing for differences in head positioning   and slice angulation. There is questionable hemorrhage in the medial   inferior left frontal lobe as well. Focus of subarachnoid hemorrhage in   the left anterior middle cranial fossa (series 3 image 15) is stable. No   new areas of hemorrhage are seen. No evidence for acute large vascular   territory infarct. Stable small chronic appearing infarcts in the medial   left occipital and posterior left temporal lobe and PCA distribution.   Additional patchy areas of low-attenuation bihemispheric white matter are   nonspecific but likely scalloped mild chronic microvascular change.    There is no midline shift. There is no hydrocephalus.    The calvarium is intact. The soft tissues of the head are unremarkable.    Maxillary mucosal thickening and partial opacification within the   bilateral mastoid air cells and left sphenoid sinus comment unchanged.    < end of copied text >

## 2019-08-13 NOTE — H&P ADULT - PROBLEM SELECTOR PLAN 4
- History of a-fib. Patient is on Eliquis 2.5 mg PO BID and atenolol 25 mg PO BID at home  - Holding Eliquis in light of intracranial bleeding. Risk of intracranial bleeding outweighs benefit.

## 2019-08-14 ENCOUNTER — TRANSCRIPTION ENCOUNTER (OUTPATIENT)
Age: 84
End: 2019-08-14

## 2019-08-14 DIAGNOSIS — E83.42 HYPOMAGNESEMIA: ICD-10-CM

## 2019-08-14 LAB
ALBUMIN SERPL ELPH-MCNC: 3.3 G/DL — SIGNIFICANT CHANGE UP (ref 3.3–5)
ALP SERPL-CCNC: 57 U/L — SIGNIFICANT CHANGE UP (ref 40–120)
ALT FLD-CCNC: 14 U/L — SIGNIFICANT CHANGE UP (ref 10–45)
ANION GAP SERPL CALC-SCNC: 11 MMOL/L — SIGNIFICANT CHANGE UP (ref 5–17)
ANION GAP SERPL CALC-SCNC: 11 MMOL/L — SIGNIFICANT CHANGE UP (ref 5–17)
ANION GAP SERPL CALC-SCNC: 12 MMOL/L — SIGNIFICANT CHANGE UP (ref 5–17)
ANION GAP SERPL CALC-SCNC: 13 MMOL/L — SIGNIFICANT CHANGE UP (ref 5–17)
ANION GAP SERPL CALC-SCNC: 14 MMOL/L — SIGNIFICANT CHANGE UP (ref 5–17)
ANION GAP SERPL CALC-SCNC: 15 MMOL/L — SIGNIFICANT CHANGE UP (ref 5–17)
APPEARANCE UR: CLEAR — SIGNIFICANT CHANGE UP
AST SERPL-CCNC: 20 U/L — SIGNIFICANT CHANGE UP (ref 10–40)
BACTERIA # UR AUTO: NEGATIVE — SIGNIFICANT CHANGE UP
BASOPHILS # BLD AUTO: 0.03 K/UL — SIGNIFICANT CHANGE UP (ref 0–0.2)
BASOPHILS NFR BLD AUTO: 0.3 % — SIGNIFICANT CHANGE UP (ref 0–2)
BILIRUB SERPL-MCNC: 0.7 MG/DL — SIGNIFICANT CHANGE UP (ref 0.2–1.2)
BILIRUB UR-MCNC: NEGATIVE — SIGNIFICANT CHANGE UP
BUN SERPL-MCNC: 13 MG/DL — SIGNIFICANT CHANGE UP (ref 7–23)
BUN SERPL-MCNC: 14 MG/DL — SIGNIFICANT CHANGE UP (ref 7–23)
BUN SERPL-MCNC: 15 MG/DL — SIGNIFICANT CHANGE UP (ref 7–23)
BUN SERPL-MCNC: 17 MG/DL — SIGNIFICANT CHANGE UP (ref 7–23)
CALCIUM SERPL-MCNC: 8.2 MG/DL — LOW (ref 8.4–10.5)
CALCIUM SERPL-MCNC: 8.2 MG/DL — LOW (ref 8.4–10.5)
CALCIUM SERPL-MCNC: 8.3 MG/DL — LOW (ref 8.4–10.5)
CALCIUM SERPL-MCNC: 8.3 MG/DL — LOW (ref 8.4–10.5)
CALCIUM SERPL-MCNC: 8.5 MG/DL — SIGNIFICANT CHANGE UP (ref 8.4–10.5)
CALCIUM SERPL-MCNC: 8.6 MG/DL — SIGNIFICANT CHANGE UP (ref 8.4–10.5)
CALCIUM SERPL-MCNC: 8.7 MG/DL — SIGNIFICANT CHANGE UP (ref 8.4–10.5)
CALCIUM SERPL-MCNC: 8.9 MG/DL — SIGNIFICANT CHANGE UP (ref 8.4–10.5)
CHLORIDE SERPL-SCNC: 86 MMOL/L — LOW (ref 96–108)
CHLORIDE SERPL-SCNC: 88 MMOL/L — LOW (ref 96–108)
CHLORIDE SERPL-SCNC: 88 MMOL/L — LOW (ref 96–108)
CHLORIDE SERPL-SCNC: 89 MMOL/L — LOW (ref 96–108)
CHLORIDE SERPL-SCNC: 89 MMOL/L — LOW (ref 96–108)
CHLORIDE SERPL-SCNC: 90 MMOL/L — LOW (ref 96–108)
CHLORIDE SERPL-SCNC: 90 MMOL/L — LOW (ref 96–108)
CHLORIDE SERPL-SCNC: 91 MMOL/L — LOW (ref 96–108)
CO2 SERPL-SCNC: 21 MMOL/L — LOW (ref 22–31)
CO2 SERPL-SCNC: 22 MMOL/L — SIGNIFICANT CHANGE UP (ref 22–31)
CO2 SERPL-SCNC: 23 MMOL/L — SIGNIFICANT CHANGE UP (ref 22–31)
COLOR SPEC: SIGNIFICANT CHANGE UP
CREAT SERPL-MCNC: 0.67 MG/DL — SIGNIFICANT CHANGE UP (ref 0.5–1.3)
CREAT SERPL-MCNC: 0.71 MG/DL — SIGNIFICANT CHANGE UP (ref 0.5–1.3)
CREAT SERPL-MCNC: 0.72 MG/DL — SIGNIFICANT CHANGE UP (ref 0.5–1.3)
CREAT SERPL-MCNC: 0.75 MG/DL — SIGNIFICANT CHANGE UP (ref 0.5–1.3)
CREAT SERPL-MCNC: 0.75 MG/DL — SIGNIFICANT CHANGE UP (ref 0.5–1.3)
CREAT SERPL-MCNC: 0.78 MG/DL — SIGNIFICANT CHANGE UP (ref 0.5–1.3)
CREAT SERPL-MCNC: 0.85 MG/DL — SIGNIFICANT CHANGE UP (ref 0.5–1.3)
CREAT SERPL-MCNC: 0.85 MG/DL — SIGNIFICANT CHANGE UP (ref 0.5–1.3)
DIFF PNL FLD: NEGATIVE — SIGNIFICANT CHANGE UP
EOSINOPHIL # BLD AUTO: 0.13 K/UL — SIGNIFICANT CHANGE UP (ref 0–0.5)
EOSINOPHIL NFR BLD AUTO: 1.4 % — SIGNIFICANT CHANGE UP (ref 0–6)
EPI CELLS # UR: 0 /HPF — SIGNIFICANT CHANGE UP (ref 0–5)
GLUCOSE SERPL-MCNC: 101 MG/DL — HIGH (ref 70–99)
GLUCOSE SERPL-MCNC: 103 MG/DL — HIGH (ref 70–99)
GLUCOSE SERPL-MCNC: 106 MG/DL — HIGH (ref 70–99)
GLUCOSE SERPL-MCNC: 107 MG/DL — HIGH (ref 70–99)
GLUCOSE SERPL-MCNC: 110 MG/DL — HIGH (ref 70–99)
GLUCOSE SERPL-MCNC: 116 MG/DL — HIGH (ref 70–99)
GLUCOSE SERPL-MCNC: 151 MG/DL — HIGH (ref 70–99)
GLUCOSE SERPL-MCNC: 99 MG/DL — SIGNIFICANT CHANGE UP (ref 70–99)
GLUCOSE UR QL: NEGATIVE — SIGNIFICANT CHANGE UP
HCT VFR BLD CALC: 30.2 % — LOW (ref 34.5–45)
HGB BLD-MCNC: 10.3 G/DL — LOW (ref 11.5–15.5)
HYALINE CASTS # UR AUTO: 0 /LPF — SIGNIFICANT CHANGE UP (ref 0–7)
IMM GRANULOCYTES NFR BLD AUTO: 0.8 % — SIGNIFICANT CHANGE UP (ref 0–1.5)
KETONES UR-MCNC: SIGNIFICANT CHANGE UP
LEUKOCYTE ESTERASE UR-ACNC: NEGATIVE — SIGNIFICANT CHANGE UP
LYMPHOCYTES # BLD AUTO: 0.93 K/UL — LOW (ref 1–3.3)
LYMPHOCYTES # BLD AUTO: 10 % — LOW (ref 13–44)
MAGNESIUM SERPL-MCNC: 1.4 MG/DL — LOW (ref 1.6–2.6)
MCHC RBC-ENTMCNC: 31.1 PG — SIGNIFICANT CHANGE UP (ref 27–34)
MCHC RBC-ENTMCNC: 34.1 GM/DL — SIGNIFICANT CHANGE UP (ref 32–36)
MCV RBC AUTO: 91.2 FL — SIGNIFICANT CHANGE UP (ref 80–100)
MONOCYTES # BLD AUTO: 1.29 K/UL — HIGH (ref 0–0.9)
MONOCYTES NFR BLD AUTO: 13.9 % — SIGNIFICANT CHANGE UP (ref 2–14)
NEUTROPHILS # BLD AUTO: 6.85 K/UL — SIGNIFICANT CHANGE UP (ref 1.8–7.4)
NEUTROPHILS NFR BLD AUTO: 73.6 % — SIGNIFICANT CHANGE UP (ref 43–77)
NITRITE UR-MCNC: NEGATIVE — SIGNIFICANT CHANGE UP
OSMOLALITY UR: 396 MOSM/KG — SIGNIFICANT CHANGE UP (ref 50–1200)
OSMOLALITY UR: 423 MOSM/KG — SIGNIFICANT CHANGE UP (ref 50–1200)
PH UR: 6 — SIGNIFICANT CHANGE UP (ref 5–8)
PHOSPHATE SERPL-MCNC: 1.7 MG/DL — LOW (ref 2.5–4.5)
PLATELET # BLD AUTO: 163 K/UL — SIGNIFICANT CHANGE UP (ref 150–400)
POTASSIUM SERPL-MCNC: 3.4 MMOL/L — LOW (ref 3.5–5.3)
POTASSIUM SERPL-MCNC: 3.4 MMOL/L — LOW (ref 3.5–5.3)
POTASSIUM SERPL-MCNC: 3.6 MMOL/L — SIGNIFICANT CHANGE UP (ref 3.5–5.3)
POTASSIUM SERPL-MCNC: 3.7 MMOL/L — SIGNIFICANT CHANGE UP (ref 3.5–5.3)
POTASSIUM SERPL-MCNC: 3.7 MMOL/L — SIGNIFICANT CHANGE UP (ref 3.5–5.3)
POTASSIUM SERPL-MCNC: 3.8 MMOL/L — SIGNIFICANT CHANGE UP (ref 3.5–5.3)
POTASSIUM SERPL-MCNC: 3.9 MMOL/L — SIGNIFICANT CHANGE UP (ref 3.5–5.3)
POTASSIUM SERPL-MCNC: 4.2 MMOL/L — SIGNIFICANT CHANGE UP (ref 3.5–5.3)
POTASSIUM SERPL-SCNC: 3.4 MMOL/L — LOW (ref 3.5–5.3)
POTASSIUM SERPL-SCNC: 3.4 MMOL/L — LOW (ref 3.5–5.3)
POTASSIUM SERPL-SCNC: 3.6 MMOL/L — SIGNIFICANT CHANGE UP (ref 3.5–5.3)
POTASSIUM SERPL-SCNC: 3.7 MMOL/L — SIGNIFICANT CHANGE UP (ref 3.5–5.3)
POTASSIUM SERPL-SCNC: 3.7 MMOL/L — SIGNIFICANT CHANGE UP (ref 3.5–5.3)
POTASSIUM SERPL-SCNC: 3.8 MMOL/L — SIGNIFICANT CHANGE UP (ref 3.5–5.3)
POTASSIUM SERPL-SCNC: 3.9 MMOL/L — SIGNIFICANT CHANGE UP (ref 3.5–5.3)
POTASSIUM SERPL-SCNC: 4.2 MMOL/L — SIGNIFICANT CHANGE UP (ref 3.5–5.3)
PROT SERPL-MCNC: 6.1 G/DL — SIGNIFICANT CHANGE UP (ref 6–8.3)
PROT UR-MCNC: NEGATIVE — SIGNIFICANT CHANGE UP
RBC # BLD: 3.31 M/UL — LOW (ref 3.8–5.2)
RBC # FLD: 12.5 % — SIGNIFICANT CHANGE UP (ref 10.3–14.5)
RBC CASTS # UR COMP ASSIST: 1 /HPF — SIGNIFICANT CHANGE UP (ref 0–4)
SODIUM SERPL-SCNC: 123 MMOL/L — LOW (ref 135–145)
SODIUM SERPL-SCNC: 124 MMOL/L — LOW (ref 135–145)
SODIUM SERPL-SCNC: 126 MMOL/L — LOW (ref 135–145)
SODIUM SERPL-SCNC: 127 MMOL/L — LOW (ref 135–145)
SODIUM UR-SCNC: 105 MMOL/L — SIGNIFICANT CHANGE UP
SP GR SPEC: 1.01 — SIGNIFICANT CHANGE UP (ref 1.01–1.02)
TSH SERPL-MCNC: 2.16 UIU/ML — SIGNIFICANT CHANGE UP (ref 0.27–4.2)
URATE SERPL-MCNC: 3.2 MG/DL — SIGNIFICANT CHANGE UP (ref 2.5–7)
URATE SERPL-MCNC: 3.2 MG/DL — SIGNIFICANT CHANGE UP (ref 2.5–7)
UROBILINOGEN FLD QL: SIGNIFICANT CHANGE UP
UUN UR-MCNC: 515 MG/DL — SIGNIFICANT CHANGE UP
WBC # BLD: 9.3 K/UL — SIGNIFICANT CHANGE UP (ref 3.8–10.5)
WBC # FLD AUTO: 9.3 K/UL — SIGNIFICANT CHANGE UP (ref 3.8–10.5)
WBC UR QL: 1 /HPF — SIGNIFICANT CHANGE UP (ref 0–5)

## 2019-08-14 PROCEDURE — 99233 SBSQ HOSP IP/OBS HIGH 50: CPT

## 2019-08-14 PROCEDURE — 70450 CT HEAD/BRAIN W/O DYE: CPT | Mod: 26

## 2019-08-14 PROCEDURE — 72125 CT NECK SPINE W/O DYE: CPT | Mod: 26

## 2019-08-14 PROCEDURE — 99233 SBSQ HOSP IP/OBS HIGH 50: CPT | Mod: GC

## 2019-08-14 RX ORDER — MAGNESIUM SULFATE 500 MG/ML
1 VIAL (ML) INJECTION ONCE
Refills: 0 | Status: COMPLETED | OUTPATIENT
Start: 2019-08-14 | End: 2019-08-14

## 2019-08-14 RX ORDER — POTASSIUM PHOSPHATE, MONOBASIC POTASSIUM PHOSPHATE, DIBASIC 236; 224 MG/ML; MG/ML
15 INJECTION, SOLUTION INTRAVENOUS ONCE
Refills: 0 | Status: COMPLETED | OUTPATIENT
Start: 2019-08-14 | End: 2019-08-14

## 2019-08-14 RX ORDER — LEVETIRACETAM 250 MG/1
500 TABLET, FILM COATED ORAL
Refills: 0 | Status: DISCONTINUED | OUTPATIENT
Start: 2019-08-14 | End: 2019-08-16

## 2019-08-14 RX ORDER — SODIUM CHLORIDE 5 G/100ML
1000 INJECTION, SOLUTION INTRAVENOUS
Refills: 0 | Status: DISCONTINUED | OUTPATIENT
Start: 2019-08-14 | End: 2019-08-15

## 2019-08-14 RX ADMIN — Medication 100 GRAM(S): at 14:00

## 2019-08-14 RX ADMIN — ATENOLOL 25 MILLIGRAM(S): 25 TABLET ORAL at 17:20

## 2019-08-14 RX ADMIN — SODIUM CHLORIDE 75 MILLILITER(S): 5 INJECTION, SOLUTION INTRAVENOUS at 18:45

## 2019-08-14 RX ADMIN — AMLODIPINE BESYLATE 5 MILLIGRAM(S): 2.5 TABLET ORAL at 05:49

## 2019-08-14 RX ADMIN — LEVETIRACETAM 500 MILLIGRAM(S): 250 TABLET, FILM COATED ORAL at 17:20

## 2019-08-14 RX ADMIN — SODIUM CHLORIDE 1 GRAM(S): 9 INJECTION INTRAMUSCULAR; INTRAVENOUS; SUBCUTANEOUS at 05:49

## 2019-08-14 RX ADMIN — SODIUM CHLORIDE 75 MILLILITER(S): 5 INJECTION, SOLUTION INTRAVENOUS at 21:14

## 2019-08-14 RX ADMIN — ATORVASTATIN CALCIUM 10 MILLIGRAM(S): 80 TABLET, FILM COATED ORAL at 21:14

## 2019-08-14 RX ADMIN — POTASSIUM PHOSPHATE, MONOBASIC POTASSIUM PHOSPHATE, DIBASIC 62.5 MILLIMOLE(S): 236; 224 INJECTION, SOLUTION INTRAVENOUS at 15:17

## 2019-08-14 RX ADMIN — ATENOLOL 25 MILLIGRAM(S): 25 TABLET ORAL at 05:49

## 2019-08-14 RX ADMIN — SODIUM CHLORIDE 75 MILLILITER(S): 5 INJECTION, SOLUTION INTRAVENOUS at 11:42

## 2019-08-14 RX ADMIN — Medication 50 MICROGRAM(S): at 05:49

## 2019-08-14 NOTE — PROGRESS NOTE ADULT - ASSESSMENT
89 y/o woman with a medical history of Afib on Eliquis, who had a mechanical fall with head trauma however without LOC.  Pt with R temporal contusion and stable repeat head CT.    Plan  - No further injuries identified on tertiary exam  - Care per primary team  -   - Page [phone] with questions or concerns 89 y/o woman with a medical history of Afib on Eliquis, who had a mechanical fall with head trauma however without LOC.  Pt with R temporal contusion and stable repeat head CT.    Plan  - No further injuries identified on tertiary exam  - Care per primary team  - Surgery will sign off  - Page [phone] with questions or concerns

## 2019-08-14 NOTE — DISCHARGE NOTE NURSING/CASE MANAGEMENT/SOCIAL WORK - NSDCPEPTSTRK_GEN_ALL_CORE
Signs and symptoms of stroke/Call 911 for stroke/Need for follow up after discharge/Prescribed medications/Stroke education booklet/Stroke warning signs and symptoms/Risk factors for stroke/Stroke support groups for patients, families, and friends

## 2019-08-14 NOTE — PHYSICAL THERAPY INITIAL EVALUATION ADULT - GENERAL OBSERVATIONS, REHAB EVAL
t rec'ed supine in bed, +bed alarm, +call bell, + B LE flowtron boots, kevyn PT eval w/o adverse reaction.

## 2019-08-14 NOTE — PROGRESS NOTE ADULT - PROBLEM SELECTOR PLAN 1
-acute on chronic hyponatremia, pt has had hyponatremia in past based on clinic lab values.   -start saline 1.5% IV at 75 cc/hr, Na is now 124. Goal of Na to 130 today. If Na far exceeds goal, give d5w  -CMP q3  -repeat urine osmole for AM labs   -allow pt to drink water PO. -acute on chronic hyponatremia, pt has had hyponatremia in past based on clinic lab values.   -start saline 1.5% IV at 75 cc/hr, Na is now 124. Goal of Na to 130 today. If Na far exceeds goal, give d5w  -CMP q3  -repeat urine osmole, urine sodium daily, and urine creatinine daily   -allow pt to drink water PO.

## 2019-08-14 NOTE — PROGRESS NOTE ADULT - PROBLEM SELECTOR PLAN 5
- History of HTN. Takes atenolol 25 mg PO BID, amlodipine 5 mg PO QD and lisinopril 10 mg PO QD at home  - Will continue with atenolol, amlodipine   - Will hold off ACEI in the setting of hyponatremia.

## 2019-08-14 NOTE — PROGRESS NOTE ADULT - ASSESSMENT
89F w/ pmh HTN, HLD, hypothyroidism, and afib on eliquis who presents s/p fall with head trauma on 8/11, found to have hemorrhagic right temporal contusion with scattered SAH and hyponatremia to 121. As per daughter, pt's mental status currently below baseline after fall, but has also been worse for about one week. Sodium has risen from 121 on initial presentation to 127 on IVF (3 L NS). No plans for surgical intervention at this time. 89F w/ pmh HTN, HLD, hypothyroidism, and afib on eliquis who presents s/p fall with head trauma on 8/11, found to have hemorrhagic right temporal contusion with scattered SAH and hyponatremia to 121. As per daughter, pt's mental status currently below baseline after fall, but has also been worse for about one week. Sodium was 121 on initial presentation, mauro to 127 on after 3 L IV NS, but has now dropped to 124. No plans for surgical intervention at this time.

## 2019-08-14 NOTE — PROGRESS NOTE ADULT - PROBLEM SELECTOR PLAN 2
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 Goal up to 10 mmol/L in 24 hours.

## 2019-08-14 NOTE — DISCHARGE NOTE NURSING/CASE MANAGEMENT/SOCIAL WORK - NSDCPEWEB_GEN_ALL_CORE
[facility] website --- [email or web address] website --- [email or web address]

## 2019-08-14 NOTE — PHYSICAL THERAPY INITIAL EVALUATION ADULT - IMPAIRMENTS FOUND, PT EVAL
Clint Kam is a new  34 year old female   Primary Care Physician: Shashi Norris MD    Chief Complaint   Patient presents with   • Skin Assessment     YANN       Pharmacy is set up and verified.    Patient would like communication of their results via:    Home Phone: [phone] (home)  Okay to leave a message containing results? Yes    Cell Phone:   Telephone Information:   Mobile [phone]     Okay to leave a message containing results? Yes    No known Latex allergy or symptoms of Latex sensitivity.    Medications, allergies, and tobacco use verified  No vitals needed per      aerobic capacity/endurance/muscle strength/gross motor/posture

## 2019-08-14 NOTE — DISCHARGE NOTE NURSING/CASE MANAGEMENT/SOCIAL WORK - NSDCDPATPORTLINK_GEN_ALL_CORE
You can access the [facility] Portal, offered by [facility], by registering with the following website: [email or web address]

## 2019-08-14 NOTE — PROGRESS NOTE ADULT - ASSESSMENT
89 y/o F w/ PMH of atrial fibrillation on Eliquis, CKD Stage 3, HTN, HLD, and hypothyroidism, transferred from [place] for inferior frontal and right temporal contusion with scattered SAH s/p fall. Patient noted with MELVIN on CKD, hyponatremia. 89 y/o F w/ PMH of atrial fibrillation on Eliquis, CKD Stage 3, HTN, HLD, and hypothyroidism, transferred from [place] for inferior frontal and right temporal contusion with scattered SAH s/p fall. Patient noted with MELVIN on CKD, hyponatremia, orthostatic hypotension.

## 2019-08-14 NOTE — PROGRESS NOTE ADULT - SUBJECTIVE AND OBJECTIVE BOX
TERTIARY TRAUMA SURVEY  ------------------------------------------------------------------------------------    Date of TTS:   Time:   Admit Date:    Trauma Activation:   Admit GCS: E-     V-     M-     HPI:  Temo Summers MD, PhD | PGY-2, Day Admit  Department of Internal Medicine  Pager [phone] ([facility]) / [phone] ([facility])  Spectra [phone]    89-yo F w/ PMH of atrial fibrillation on Eliquis, CKD Stage 3, HTN, HLD, and hypothyroidism, sent from [place] for hemorrhagic R temporal contusion with scattered SAH s/p fall. Patient slipped while trying to use the toilet. Patient hit the back of the head onto the toilet. Patient remembers the fall and head trauma. No LOC. No visual hallucination, palpitations, or vomiting before or after the fall. Patient went to the bed to lie down. She had a phone conversation with her daughter, who noticed patient stuttering and searching for words. Patient presented to urgent care center to receive staples for the laceration from the head trauma in the occipital region. Patient was subsequently referred to [facility]. CT head was performed, which revealed hemorrhagic R temporal contusion with scattered SAH. Patient was provided K-Centra and was subsequently transferred to [facility] for further management. Of note, patient is ambulatory without a walker at home. She lives alone. Last seen by daughter 2 weeks ago, and was of her normal health at that time. Currently denies fever, chills, nausea, vomiting, chest pain, or visual changes. Endorses headache and coldness.    In ED, T 97.7, HR 72, /68, RR 18, Sat 100% RA. WBC 11.92. Hgb 11.9, Na 122. Ofelia 75, Uosm 575, Sosm 264. BUN/Cr 27/1.74. Patient given 3L NS. NSy was consulted which recommended holding Eliquis with no acute surgical intervention. Keppra 500 mg BID was started. (13 Aug 2019 12:23)      INTERVAL EVENTS: ***    PAST MEDICAL & SURGICAL HISTORY:  Atrial fibrillation  Hypothyroidism  HTN (hypertension)  HLD (hyperlipidemia)  No significant past surgical history    [] No significant past history as reviewed with the patient and family    FAMILY HISTORY:  No pertinent family history in first degree relatives    [] Family history not pertinent as reviewed with the patient and family    SOCIAL HISTORY: ***    ALLERGIES: No Known Allergies      HOME MEDICATIONS: ***    CURRENT MEDICATIONS  MEDICATIONS (STANDING): amLODIPine   Tablet 5 milliGRAM(s) Oral daily  ATENolol  Tablet 25 milliGRAM(s) Oral every 12 hours  atorvastatin 10 milliGRAM(s) Oral at bedtime  levothyroxine 50 MICROGram(s) Oral daily  sodium chloride 1 Gram(s) Oral two times a day  sodium chloride 1.5%. 500 milliLiter(s) IV Continuous <Continuous>    MEDICATIONS (PRN):  -----------------------------------------------------------------------------------    VITAL SIGNS:  T(C): 36.7 (08-14-19 @ 04:40), Max: 37.1 (08-13-19 @ 07:12)  HR: 98 (08-14-19 @ 04:40) (71 - 98)  BP: 148/78 (08-14-19 @ 04:40) (124/79 - 169/80)  RR: 18 (08-14-19 @ 04:40) (17 - 18)  SpO2: 95% (08-14-19 @ 04:40) (95% - 97%)  CAPILLARY BLOOD GLUCOSE        Drug Dosing Weight  Height (cm): 160.02 (12 Aug 2019 20:16)  Weight (kg): 59 (12 Aug 2019 20:16)  BMI (kg/m2): 23 (12 Aug 2019 20:16)  BSA (m2): 1.61 (12 Aug 2019 20:16)    08-13 @ 07:01  -  08-14 @ 05:54  --------------------------------------------------------  IN:    Oral Fluid: 400 mL    sodium chloride 1.5%.: 210 mL  Total IN: 610 mL    OUT:    Voided: 500 mL  Total OUT: 500 mL    Total NET: 110 mL          PHYSICAL EXAM:  General: NAD, Sitting in bed comfortably, not irritable   HEENT: NC, EOMI, right occipital bruising  Neck: Soft, supple  Cardio: RRR, nml S1/S2  Resp: Good effort, CTA b/l  Thorax: No chest wall tenderness  Breast: No lesions/masses, no drainage  GI/Abd: Soft, NT/ND, no rebound/guarding, no masses palpated  Vascular: Extremities warm, brisk cap refill, B/l radial pulses palpable, b/l DP/PT palpable, no palpable abdominal pulsatile mass  Skin: Intact, no breakdown  Lymphatic/Nodes: No palpable lymphadenopathy  Musculoskeletal: All 4 extremities moving spontaneously, no limitations    LABS:  CBC (08-12 @ 22:04)                              11.8                           11.0<H>  )----------------(  219        86.2<H>% Neutrophils, 5.6<L>% Lymphocytes, ANC: 9.5<H>                              33.5<L>  CBC (08-12 @ 17:58)                              11.9                           11.92<H>  )----------------(  233        83.7<H>% Neutrophils, 4.2<L>% Lymphocytes, ANC: 9.98<H>                              34.4<L>    BMP (08-14 @ 01:33)             123<L>  |  90<L>   |  17    		Ca++ --      Ca 8.2<L>             ---------------------------------( 99    		Mg --                 3.7     |  22      |  0.85  			Ph --      BMP (08-13 @ 19:07)             122<L>  |  87<L>   |  18    		Ca++ --      Ca 9.1                ---------------------------------( 143<H>		Mg --                 4.6     |  21<L>   |  0.87  			Ph --        LFTs (08-12 @ 22:04)      TPro 7.2 / Alb 4.4 / TBili 0.9 / DBili -- / AST 25 / ALT 19 / AlkPhos 68  LFTs (08-12 @ 17:58)      TPro 7.7 / Alb 3.9 / TBili 0.9 / DBili -- / AST 27 / ALT 27 / AlkPhos 75    Coags (08-12 @ 22:04)  aPTT 27.1<L> / INR 0.97 / PT 11.2  Coags (08-12 @ 17:58)  aPTT 29.6 / INR 1.10 / PT 12.4    Cardiac Markers (08-13 @ 14:08)     HSTrop: -- / CKMB: -- / CK: 207          MICROBIOLOGY:        ------------------------------------------------------------------------------------------  RADIOLOGICAL FINDINGS REVIEW:  ***     Head CT:   IMPRESSION:    1)  hemorrhagic contusions are noted in the right temporal lobe and in   the inferior frontal lobes right greater than left. In conjunction there   is underlying scattered chronic ischemic changes in both hemispheres with   volume loss. 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  2)  there is localized extracerebral blood in the right temporal fossa   and inferior anterior cranial fossa, but there is no large subdural or   epidural collection. However interval reassessment and follow-up is   recommended.      IMPRESSION:     Stable exam. Inferior frontal and anterior right temporal lobe   hemorrhagic contusions and scattered subarachnoid hemorrhage as above. No   new hemorrhage or midline shift.         List Injuries Identified to Date:    Afib: Afib  Hypothyroidism: Hypothyroidism  Hyperlipidemia: Hyperlipidemia  Hypertension: Hypertension  Acute on chronic kidney failure: Acute on chronic kidney failure  Traumatic hemorrhage of right cerebrum: Traumatic hemorrhage of right cerebrum  Need for prophylactic measure: Need for prophylactic measure  HLD (hyperlipidemia): HLD (hyperlipidemia)  HTN (hypertension): HTN (hypertension)  Atrial fibrillation: Atrial fibrillation  Acute kidney injury superimposed on chronic kidney disease: Acute kidney injury superimposed on chronic kidney disease  Hyponatremia: Hyponatremia  Traumatic hemorrhage of right cerebrum without loss of consciousness, initial encounter: Traumatic hemorrhage of right cerebrum without loss of consciousness, initial encounter      List Operative and Interventional Radiological Procedures: -      Consults (Date):  [x] Neurosurgery   [x] Neurology  [x] Nephrology  [] Orthopedic Surgery  [] Spine Surgery  [] Plastic Surgery  [] ENT  [] Urology  [] PM&R  [] Social Work    INTERPRETATION/ASSESSMENT: TERTIARY TRAUMA SURVEY  ------------------------------------------------------------------------------------    Date of TTS: 8/14/19  Time: 6:05  Admit Date: 8/13/19  Trauma Activation:   Admit GCS: 15     HPI:  89-yo F w/ PMH of atrial fibrillation on Eliquis, CKD Stage 3, HTN, HLD, and hypothyroidism, sent from [place] for hemorrhagic R temporal contusion with scattered SAH s/p fall. Patient slipped while trying to use the toilet. Patient hit the back of the head onto the toilet. Patient remembers the fall and head trauma. No LOC. No visual hallucination, palpitations, or vomiting before or after the fall. Patient went to the bed to lie down. She had a phone conversation with her daughter, who noticed patient stuttering and searching for words. Patient presented to urgent care center to receive staples for the laceration from the head trauma in the occipital region. Patient was subsequently referred to [facility]. CT head was performed, which revealed hemorrhagic R temporal contusion with scattered SAH. Patient was provided K-Centra and was subsequently transferred to [facility] for further management. Of note, patient is ambulatory without a walker at home. She lives alone. Last seen by daughter 2 weeks ago, and was of her normal health at that time. Currently denies fever, chills, nausea, vomiting, chest pain, or visual changes. Endorses headache and coldness.    In ED, T 97.7, HR 72, /68, RR 18, Sat 100% RA. WBC 11.92. Hgb 11.9, Na 122. Ofelia 75, Uosm 575, Sosm 264. BUN/Cr 27/1.74. Patient given 3L NS. NSy was consulted which recommended holding Eliquis with no acute surgical intervention. Keppra 500 mg BID was started. (13 Aug 2019 12:23)      PAST MEDICAL & SURGICAL HISTORY:  Atrial fibrillation  Hypothyroidism  HTN (hypertension)  HLD (hyperlipidemia)  No significant past surgical history    [] No significant past history as reviewed with the patient and family    FAMILY HISTORY:  No pertinent family history in first degree relatives    [] Family history not pertinent as reviewed with the patient and family    SOCIAL HISTORY: ***    ALLERGIES: No Known Allergies      HOME MEDICATIONS: ***    CURRENT MEDICATIONS  MEDICATIONS (STANDING): amLODIPine   Tablet 5 milliGRAM(s) Oral daily  ATENolol  Tablet 25 milliGRAM(s) Oral every 12 hours  atorvastatin 10 milliGRAM(s) Oral at bedtime  levothyroxine 50 MICROGram(s) Oral daily  sodium chloride 1 Gram(s) Oral two times a day  sodium chloride 1.5%. 500 milliLiter(s) IV Continuous <Continuous>    MEDICATIONS (PRN):  -----------------------------------------------------------------------------------    VITAL SIGNS:  T(C): 36.7 (08-14-19 @ 04:40), Max: 37.1 (08-13-19 @ 07:12)  HR: 98 (08-14-19 @ 04:40) (71 - 98)  BP: 148/78 (08-14-19 @ 04:40) (124/79 - 169/80)  RR: 18 (08-14-19 @ 04:40) (17 - 18)  SpO2: 95% (08-14-19 @ 04:40) (95% - 97%)  CAPILLARY BLOOD GLUCOSE        Drug Dosing Weight  Height (cm): 160.02 (12 Aug 2019 20:16)  Weight (kg): 59 (12 Aug 2019 20:16)  BMI (kg/m2): 23 (12 Aug 2019 20:16)  BSA (m2): 1.61 (12 Aug 2019 20:16)    08-13 @ 07:01  -  08-14 @ 05:54  --------------------------------------------------------  IN:    Oral Fluid: 400 mL    sodium chloride 1.5%.: 210 mL  Total IN: 610 mL    OUT:    Voided: 500 mL  Total OUT: 500 mL    Total NET: 110 mL          PHYSICAL EXAM:  General: NAD, Sitting in bed comfortably, not irritable   HEENT: NC, EOMI, right occipital bruising  Neck: Soft, supple  Cardio: RRR, nml S1/S2  Resp: Good effort, CTA b/l  Thorax: No chest wall tenderness  Breast: No lesions/masses, no drainage  GI/Abd: Soft, NT/ND, no rebound/guarding, no masses palpated  Vascular: Extremities warm, brisk cap refill, B/l radial pulses palpable, b/l DP/PT palpable, no palpable abdominal pulsatile mass  Skin: Intact, no breakdown  Lymphatic/Nodes: No palpable lymphadenopathy  Musculoskeletal: All 4 extremities moving spontaneously, no limitations    LABS:  CBC (08-12 @ 22:04)                              11.8                           11.0<H>  )----------------(  219        86.2<H>% Neutrophils, 5.6<L>% Lymphocytes, ANC: 9.5<H>                              33.5<L>  CBC (08-12 @ 17:58)                              11.9                           11.92<H>  )----------------(  233        83.7<H>% Neutrophils, 4.2<L>% Lymphocytes, ANC: 9.98<H>                              34.4<L>    BMP (08-14 @ 01:33)             123<L>  |  90<L>   |  17    		Ca++ --      Ca 8.2<L>             ---------------------------------( 99    		Mg --                 3.7     |  22      |  0.85  			Ph --      BMP (08-13 @ 19:07)             122<L>  |  87<L>   |  18    		Ca++ --      Ca 9.1                ---------------------------------( 143<H>		Mg --                 4.6     |  21<L>   |  0.87  			Ph --        LFTs (08-12 @ 22:04)      TPro 7.2 / Alb 4.4 / TBili 0.9 / DBili -- / AST 25 / ALT 19 / AlkPhos 68  LFTs (08-12 @ 17:58)      TPro 7.7 / Alb 3.9 / TBili 0.9 / DBili -- / AST 27 / ALT 27 / AlkPhos 75    Coags (08-12 @ 22:04)  aPTT 27.1<L> / INR 0.97 / PT 11.2  Coags (08-12 @ 17:58)  aPTT 29.6 / INR 1.10 / PT 12.4    Cardiac Markers (08-13 @ 14:08)     HSTrop: -- / CKMB: -- / CK: 207          MICROBIOLOGY:        ------------------------------------------------------------------------------------------  RADIOLOGICAL FINDINGS REVIEW:     Head CT:   IMPRESSION:    1)  hemorrhagic contusions are noted in the right temporal lobe and in   the inferior frontal lobes right greater than left. In conjunction there   is underlying scattered chronic ischemic changes in both hemispheres with   volume loss. 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  2)  there is localized extracerebral blood in the right temporal fossa   and inferior anterior cranial fossa, but there is no large subdural or   epidural collection. However interval reassessment and follow-up is   recommended.      IMPRESSION:     Stable exam. Inferior frontal and anterior right temporal lobe   hemorrhagic contusions and scattered subarachnoid hemorrhage as above. No   new hemorrhage or midline shift.         List Injuries Identified to Date:    Afib: Afib  Hypothyroidism: Hypothyroidism  Hyperlipidemia: Hyperlipidemia  Hypertension: Hypertension  Acute on chronic kidney failure: Acute on chronic kidney failure  Traumatic hemorrhage of right cerebrum: Traumatic hemorrhage of right cerebrum  Need for prophylactic measure: Need for prophylactic measure  HLD (hyperlipidemia): HLD (hyperlipidemia)  HTN (hypertension): HTN (hypertension)  Atrial fibrillation: Atrial fibrillation  Acute kidney injury superimposed on chronic kidney disease: Acute kidney injury superimposed on chronic kidney disease  Hyponatremia: Hyponatremia  Traumatic hemorrhage of right cerebrum without loss of consciousness, initial encounter: Traumatic hemorrhage of right cerebrum without loss of consciousness, initial encounter      List Operative and Interventional Radiological Procedures: -      Consults (Date):  [x] Neurosurgery   [x] Neurology  [x] Nephrology  [] Orthopedic Surgery  [] Spine Surgery  [] Plastic Surgery  [] ENT  [] Urology  [] PM&R  [] Social Work

## 2019-08-14 NOTE — PROGRESS NOTE ADULT - SUBJECTIVE AND OBJECTIVE BOX
[facility] DIVISION OF KIDNEY DISEASES AND HYPERTENSION -- FOLLOW UP NOTE  --------------------------------------------------------------------------------    24 hour events/subjective: Patient seen and examined at the bedside. No acute events overnight.        PAST HISTORY  --------------------------------------------------------------------------------  No significant changes to PMH, PSH, FHx, SHx, unless otherwise noted    ALLERGIES & MEDICATIONS  --------------------------------------------------------------------------------  Allergies    No Known Allergies    Intolerances      Standing Inpatient Medications  amLODIPine   Tablet 5 milliGRAM(s) Oral daily  ATENolol  Tablet 25 milliGRAM(s) Oral every 12 hours  atorvastatin 10 milliGRAM(s) Oral at bedtime  levothyroxine 50 MICROGram(s) Oral daily  sodium chloride 1 Gram(s) Oral two times a day    PRN Inpatient Medications      REVIEW OF SYSTEMS  --------------------------------------------------------------------------------  Gen: No fevers/chills  Head/Eyes/Ears/Mouth: No headache; Normal hearing; Normal vision    Respiratory: No dyspnea, cough  CV: No chest pain  GI: No abdominal pain, diarrhea, constipation, nausea, vomiting  : No increased frequency, dysuria  MSK: No joint pain/swelling; no edema    All other systems were reviewed and are negative, except as noted.    >>> <<<    VITALS/PHYSICAL EXAM  --------------------------------------------------------------------------------  T(C): 36.7 (08-14-19 @ 04:40), Max: 36.9 (08-13-19 @ 15:30)  HR: 98 (08-14-19 @ 04:40) (71 - 98)  BP: 148/78 (08-14-19 @ 04:40) (124/79 - 169/75)  RR: 18 (08-14-19 @ 04:40) (17 - 18)  SpO2: 95% (08-14-19 @ 04:40) (95% - 97%)  Wt(kg): --  Height (cm): 160.02 (08-12-19 @ 20:16)  Weight (kg): 59 (08-12-19 @ 20:16)  BMI (kg/m2): 23 (08-12-19 @ 20:16)  BSA (m2): 1.61 (08-12-19 @ 20:16)      08-13-19 @ 07:01  -  08-14-19 @ 07:00  --------------------------------------------------------  IN: 610 mL / OUT: 500 mL / NET: 110 mL    08-14-19 @ 07:01  -  08-14-19 @ 10:13  --------------------------------------------------------  IN: 0 mL / OUT: 400 mL / NET: -400 mL      Physical Exam:    	Gen: NAD, well-appearing  	HEENT: PERRL, supple neck, clear oropharynx  	Pulm: CTA B/L  	CV: RRR, S1S2; no rub  	Abd: +BS, soft, nontender/nondistended       	: No suprapubic tenderness  	LE: Warm, no clubbing, intact strength; no edema  	Neuro: No focal deficits, AOX3, no asterexis      	Vascular Access:      LABS/STUDIES  --------------------------------------------------------------------------------              10.3   9.30  >-----------<  163      [08-14-19 @ 08:51]              30.2     124  |  88  |  13  ----------------------------<  107      [08-14-19 @ 08:14]  3.4   |  23  |  0.67        Ca     8.3     [08-14-19 @ 08:14]      Mg     1.4     [08-14-19 @ 05:57]      Phos  1.7     [08-14-19 @ 05:57]    TPro  6.1  /  Alb  3.3  /  TBili  0.7  /  DBili  x   /  AST  20  /  ALT  14  /  AlkPhos  57  [08-14-19 @ 05:57]    PT/INR: PT 11.2 , INR 0.97       [08-12-19 @ 22:04]  PTT: 27.1       [08-12-19 @ 22:04]    Uric acid 3.2      [08-14-19 @ 05:57]        [08-13-19 @ 14:08]  Serum Osmolality 264      [08-13-19 @ 04:39]    Creatinine Trend:  SCr 0.67 [08-14 @ 08:14]  SCr 0.72 [08-14 @ 05:57]  SCr 0.85 [08-14 @ 01:33]  SCr 0.87 [08-13 @ 19:07]  SCr 0.84 [08-13 @ 14:08]    Urinalysis - [08-14-19 @ 08:48]      Color Light Yellow / Appearance Clear / SG 1.010 / pH 6.0      Gluc Negative / Ketone Trace  / Bili Negative / Urobili <2 mg/dL       Blood Negative / Protein Negative / Leuk Est Negative / Nitrite Negative      RBC  / WBC  / Hyaline  / Gran  / Sq Epi  / Non Sq Epi  / Bacteria     Urine Creatinine 70      [08-13-19 @ 17:17]  Urine Sodium 105      [08-14-19 @ 04:54]  Urine Urea Nitrogen 515      [08-13-19 @ 22:16]  Urine Potassium 36      [08-13-19 @ 17:17]  Urine Osmolality 467      [08-13-19 @ 17:17]    TSH 2.16      [08-14-19 @ 08:49] [facility] DIVISION OF KIDNEY DISEASES AND HYPERTENSION -- FOLLOW UP NOTE  --------------------------------------------------------------------------------    24 hour events/subjective: Patient seen and examined at the bedside. No acute events overnight. Mental status has improved from yesterday.        PAST HISTORY  --------------------------------------------------------------------------------  No significant changes to PMH, PSH, FHx, SHx, unless otherwise noted    ALLERGIES & MEDICATIONS  --------------------------------------------------------------------------------  Allergies    No Known Allergies    Intolerances      Standing Inpatient Medications  amLODIPine   Tablet 5 milliGRAM(s) Oral daily  ATENolol  Tablet 25 milliGRAM(s) Oral every 12 hours  atorvastatin 10 milliGRAM(s) Oral at bedtime  levothyroxine 50 MICROGram(s) Oral daily  sodium chloride 1 Gram(s) Oral two times a day    PRN Inpatient Medications      REVIEW OF SYSTEMS  --------------------------------------------------------------------------------  Gen: No fevers/chills  Head/Eyes/Ears/Mouth: pain at site of occipital laceration, no headache. Poor auditory acuity at baseline.  Respiratory: No dyspnea, cough  CV: No chest pain  GI: No abdominal pain, diarrhea, constipation, nausea, vomiting  : No increased frequency, dysuria  MSK: No joint pain/swelling; no edema    All other systems were reviewed and are negative, except as noted.    >>> <<<    VITALS/PHYSICAL EXAM  --------------------------------------------------------------------------------  T(C): 36.7 (08-14-19 @ 04:40), Max: 36.9 (08-13-19 @ 15:30)  HR: 98 (08-14-19 @ 04:40) (71 - 98)  BP: 148/78 (08-14-19 @ 04:40) (124/79 - 169/75)  RR: 18 (08-14-19 @ 04:40) (17 - 18)  SpO2: 95% (08-14-19 @ 04:40) (95% - 97%)  Wt(kg): --  Height (cm): 160.02 (08-12-19 @ 20:16)  Weight (kg): 59 (08-12-19 @ 20:16)  BMI (kg/m2): 23 (08-12-19 @ 20:16)  BSA (m2): 1.61 (08-12-19 @ 20:16)      08-13-19 @ 07:01  -  08-14-19 @ 07:00  --------------------------------------------------------  IN: 610 mL / OUT: 500 mL / NET: 110 mL    08-14-19 @ 07:01  -  08-14-19 @ 10:13  --------------------------------------------------------  IN: 0 mL / OUT: 400 mL / NET: -400 mL      Physical Exam:    Gen: NAD, lying in bed, aox3  HEENT: PERRL, supple neck, clear oropharynx  Pulm: CTA B/L, no rrw  CV: irregularly irregular, s1 s2 auscultated  Abd: +BS, soft, nontender/nondistended  : No suprapubic tenderness  LE: Warm, no clubbing, intact strength; no edema  Neuro: CN 2-12 intact, no facial droop, strength 5/5 in UE and LE b/L, difficulty following some commands, coordination intact  Access: peripheral lines  	      LABS/STUDIES  --------------------------------------------------------------------------------              10.3   9.30  >-----------<  163      [08-14-19 @ 08:51]              30.2     124  |  88  |  13  ----------------------------<  107      [08-14-19 @ 08:14]  3.4   |  23  |  0.67        Ca     8.3     [08-14-19 @ 08:14]      Mg     1.4     [08-14-19 @ 05:57]      Phos  1.7     [08-14-19 @ 05:57]    TPro  6.1  /  Alb  3.3  /  TBili  0.7  /  DBili  x   /  AST  20  /  ALT  14  /  AlkPhos  57  [08-14-19 @ 05:57]    PT/INR: PT 11.2 , INR 0.97       [08-12-19 @ 22:04]  PTT: 27.1       [08-12-19 @ 22:04]    Uric acid 3.2      [08-14-19 @ 05:57]        [08-13-19 @ 14:08]  Serum Osmolality 264      [08-13-19 @ 04:39]    Creatinine Trend:  SCr 0.67 [08-14 @ 08:14]  SCr 0.72 [08-14 @ 05:57]  SCr 0.85 [08-14 @ 01:33]  SCr 0.87 [08-13 @ 19:07]  SCr 0.84 [08-13 @ 14:08]    Urinalysis - [08-14-19 @ 08:48]      Color Light Yellow / Appearance Clear / SG 1.010 / pH 6.0      Gluc Negative / Ketone Trace  / Bili Negative / Urobili <2 mg/dL       Blood Negative / Protein Negative / Leuk Est Negative / Nitrite Negative      RBC  / WBC  / Hyaline  / Gran  / Sq Epi  / Non Sq Epi  / Bacteria     Urine Creatinine 70      [08-13-19 @ 17:17]  Urine Sodium 105      [08-14-19 @ 04:54]  Urine Urea Nitrogen 515      [08-13-19 @ 22:16]  Urine Potassium 36      [08-13-19 @ 17:17]  Urine Osmolality 467      [08-13-19 @ 17:17]    TSH 2.16      [08-14-19 @ 08:49]

## 2019-08-14 NOTE — PROGRESS NOTE ADULT - PROBLEM SELECTOR PLAN 1
- S/p mechanical fall, occipital laceration repaired OSH.  - CT head revealing inferior frontal and anterior right temporal lobe hemorrhagic contusions and patchy SAH. No midline shift.  - S/p 1g Keppra load. Will continue with Keppra 500 mg PO BID  - Neurosurg consulted. No surgical intervention at this time. Will hold Eliquis for 2 weeks.  - Pain management with Tylenol. Avoid NSAIDs in the setting of bleeding  - check repeat CT head and CT c-spine as well - S/p mechanical fall, occipital laceration repaired OSH.   - + orthostatic hypotension, though, no symptoms reported. on 1.5% Nacl IVF. will continue to monitor for now  - CT head revealing inferior frontal and anterior right temporal lobe hemorrhagic contusions and patchy SAH. No midline shift.  - S/p 1g Keppra load. Will continue with Keppra 500 mg PO BID  - Neurosurg consulted. No surgical intervention at this time. Will hold Eliquis for 2 weeks.  - Pain management with Tylenol. Avoid NSAIDs in the setting of bleeding  - check repeat CT head and CT c-spine as well

## 2019-08-14 NOTE — PROGRESS NOTE ADULT - SUBJECTIVE AND OBJECTIVE BOX
Patient is a 89y old  Female who presents with a chief complaint of Head trauma s/p fall (14 Aug 2019 10:13)      SUBJECTIVE / OVERNIGHT EVENTS: patient seen and examined earlier this morning. patient endorses feeling tired and c/o mild frontal and posterior HA. denies any neck or other pain in her body.    MEDICATIONS  (STANDING):  amLODIPine   Tablet 5 milliGRAM(s) Oral daily  ATENolol  Tablet 25 milliGRAM(s) Oral every 12 hours  atorvastatin 10 milliGRAM(s) Oral at bedtime  levETIRAcetam 500 milliGRAM(s) Oral two times a day  levothyroxine 50 MICROGram(s) Oral daily  magnesium sulfate  IVPB 1 Gram(s) IV Intermittent once  potassium phosphate IVPB 15 milliMole(s) IV Intermittent once  sodium chloride 1.5%. 1000 milliLiter(s) (75 mL/Hr) IV Continuous <Continuous>    MEDICATIONS  (PRN):      Vital Signs Last 24 Hrs  T(C): 36.6 (14 Aug 2019 12:42), Max: 36.9 (13 Aug 2019 15:30)  T(F): 97.9 (14 Aug 2019 12:42), Max: 98.4 (13 Aug 2019 15:30)  HR: 97 (14 Aug 2019 12:42) (71 - 100)  BP: 137/75 (14 Aug 2019 12:42) (124/79 - 160/74)  BP(mean): --  RR: 18 (14 Aug 2019 12:42) (17 - 18)  SpO2: 97% (14 Aug 2019 12:42) (95% - 97%)  CAPILLARY BLOOD GLUCOSE        I&O's Summary    13 Aug 2019 07:  -  14 Aug 2019 07:00  --------------------------------------------------------  IN: 610 mL / OUT: 500 mL / NET: 110 mL    14 Aug 2019 07:01  -  14 Aug 2019 12:52  --------------------------------------------------------  IN: 0 mL / OUT: 400 mL / NET: -400 mL        PHYSICAL EXAM:  GENERAL: NAD  HEENT: neck supple  LUNG: Clear to auscultation bilaterally; No wheeze  HEART: S1, S2  ABDOMEN: Soft, Nontender, Nondistended, Bowel sounds present  EXTREMITIES: No leg edema  PSYCH: normal affect, calm  NEUROLOGY: AAO x 2-3, moves all extremities and follows commands   SKIN: warm and dry      LABS:                        10.3   9.30  )-----------( 163      ( 14 Aug 2019 08:51 )             30.2     08-14    124<L>  |  88<L>  |  13  ----------------------------<  107<H>  3.4<L>   |  23  |  0.67    Ca    8.3<L>      14 Aug 2019 08:14  Phos  1.7     08-14  Mg     1.4     08-14    TPro  6.1  /  Alb  3.3  /  TBili  0.7  /  DBili  x   /  AST  20  /  ALT  14  /  AlkPhos  57  08-14    PT/INR - ( 12 Aug 2019 22:04 )   PT: 11.2 sec;   INR: 0.97 ratio         PTT - ( 12 Aug 2019 22:04 )  PTT:27.1 sec  CARDIAC MARKERS ( 13 Aug 2019 14:08 )  x     / x     / 207 U/L / x     / x          Urinalysis Basic - ( 14 Aug 2019 08:48 )    Color: Light Yellow / Appearance: Clear / S.010 / pH: x  Gluc: x / Ketone: Trace  / Bili: Negative / Urobili: <2 mg/dL   Blood: x / Protein: Negative / Nitrite: Negative   Leuk Esterase: Negative / RBC: 1 /HPF / WBC 1 /HPF   Sq Epi: x / Non Sq Epi: 0 /HPF / Bacteria: Negative        RADIOLOGY & ADDITIONAL TESTS:    Imaging Personally Reviewed:    < from: CT Head No Cont (19 @ 21:02) >  IMPRESSION:     Stable exam. Inferior frontal and anterior right temporal lobe   hemorrhagic contusions and scattered subarachnoid hemorrhage as above. No   new hemorrhage or midline shift.    < end of copied text >      Consultant(s) Notes Reviewed:  neurosurgery, neurology, renal, trauma surgery    Care Discussed with Consultants/Other Providers: medicine NP

## 2019-08-14 NOTE — PHYSICAL THERAPY INITIAL EVALUATION ADULT - ADDITIONAL COMMENTS
Pt notes she lives in a house w/ 3-4 steps to enter, no handrails. Once inside pt's bedroom/bathroom is on the first floor, but her kitchen is in the basement w/ a flight of steps and handrail on the R side on descent. PTA pt (I) w/ all functional mobility & ADL w/o AD. States her daughter visits periodically.

## 2019-08-15 DIAGNOSIS — N94.9 UNSPECIFIED CONDITION ASSOCIATED WITH FEMALE GENITAL ORGANS AND MENSTRUAL CYCLE: ICD-10-CM

## 2019-08-15 LAB
ANION GAP SERPL CALC-SCNC: 10 MMOL/L — SIGNIFICANT CHANGE UP (ref 5–17)
ANION GAP SERPL CALC-SCNC: 10 MMOL/L — SIGNIFICANT CHANGE UP (ref 5–17)
ANION GAP SERPL CALC-SCNC: 13 MMOL/L — SIGNIFICANT CHANGE UP (ref 5–17)
ANION GAP SERPL CALC-SCNC: 8 MMOL/L — SIGNIFICANT CHANGE UP (ref 5–17)
ANION GAP SERPL CALC-SCNC: 9 MMOL/L — SIGNIFICANT CHANGE UP (ref 5–17)
BUN SERPL-MCNC: 13 MG/DL — SIGNIFICANT CHANGE UP (ref 7–23)
BUN SERPL-MCNC: 13 MG/DL — SIGNIFICANT CHANGE UP (ref 7–23)
BUN SERPL-MCNC: 14 MG/DL — SIGNIFICANT CHANGE UP (ref 7–23)
BUN SERPL-MCNC: 16 MG/DL — SIGNIFICANT CHANGE UP (ref 7–23)
BUN SERPL-MCNC: 19 MG/DL — SIGNIFICANT CHANGE UP (ref 7–23)
CALCIUM SERPL-MCNC: 7.8 MG/DL — LOW (ref 8.4–10.5)
CALCIUM SERPL-MCNC: 7.9 MG/DL — LOW (ref 8.4–10.5)
CALCIUM SERPL-MCNC: 8 MG/DL — LOW (ref 8.4–10.5)
CALCIUM SERPL-MCNC: 8.5 MG/DL — SIGNIFICANT CHANGE UP (ref 8.4–10.5)
CALCIUM SERPL-MCNC: 8.6 MG/DL — SIGNIFICANT CHANGE UP (ref 8.4–10.5)
CHLORIDE SERPL-SCNC: 92 MMOL/L — LOW (ref 96–108)
CHLORIDE SERPL-SCNC: 95 MMOL/L — LOW (ref 96–108)
CHLORIDE SERPL-SCNC: 97 MMOL/L — SIGNIFICANT CHANGE UP (ref 96–108)
CHLORIDE SERPL-SCNC: 97 MMOL/L — SIGNIFICANT CHANGE UP (ref 96–108)
CHLORIDE SERPL-SCNC: 98 MMOL/L — SIGNIFICANT CHANGE UP (ref 96–108)
CO2 SERPL-SCNC: 22 MMOL/L — SIGNIFICANT CHANGE UP (ref 22–31)
CO2 SERPL-SCNC: 23 MMOL/L — SIGNIFICANT CHANGE UP (ref 22–31)
CO2 SERPL-SCNC: 23 MMOL/L — SIGNIFICANT CHANGE UP (ref 22–31)
CREAT SERPL-MCNC: 0.7 MG/DL — SIGNIFICANT CHANGE UP (ref 0.5–1.3)
CREAT SERPL-MCNC: 0.77 MG/DL — SIGNIFICANT CHANGE UP (ref 0.5–1.3)
CREAT SERPL-MCNC: 0.79 MG/DL — SIGNIFICANT CHANGE UP (ref 0.5–1.3)
CREAT SERPL-MCNC: 0.85 MG/DL — SIGNIFICANT CHANGE UP (ref 0.5–1.3)
CREAT SERPL-MCNC: 0.96 MG/DL — SIGNIFICANT CHANGE UP (ref 0.5–1.3)
GLUCOSE SERPL-MCNC: 102 MG/DL — HIGH (ref 70–99)
GLUCOSE SERPL-MCNC: 108 MG/DL — HIGH (ref 70–99)
GLUCOSE SERPL-MCNC: 124 MG/DL — HIGH (ref 70–99)
GLUCOSE SERPL-MCNC: 88 MG/DL — SIGNIFICANT CHANGE UP (ref 70–99)
GLUCOSE SERPL-MCNC: 92 MG/DL — SIGNIFICANT CHANGE UP (ref 70–99)
HCT VFR BLD CALC: 29.3 % — LOW (ref 34.5–45)
HGB BLD-MCNC: 10 G/DL — LOW (ref 11.5–15.5)
MCHC RBC-ENTMCNC: 31.1 PG — SIGNIFICANT CHANGE UP (ref 27–34)
MCHC RBC-ENTMCNC: 34.1 GM/DL — SIGNIFICANT CHANGE UP (ref 32–36)
MCV RBC AUTO: 91 FL — SIGNIFICANT CHANGE UP (ref 80–100)
PLATELET # BLD AUTO: 174 K/UL — SIGNIFICANT CHANGE UP (ref 150–400)
POTASSIUM SERPL-MCNC: 3.9 MMOL/L — SIGNIFICANT CHANGE UP (ref 3.5–5.3)
POTASSIUM SERPL-MCNC: 4 MMOL/L — SIGNIFICANT CHANGE UP (ref 3.5–5.3)
POTASSIUM SERPL-MCNC: 4.5 MMOL/L — SIGNIFICANT CHANGE UP (ref 3.5–5.3)
POTASSIUM SERPL-SCNC: 3.9 MMOL/L — SIGNIFICANT CHANGE UP (ref 3.5–5.3)
POTASSIUM SERPL-SCNC: 4 MMOL/L — SIGNIFICANT CHANGE UP (ref 3.5–5.3)
POTASSIUM SERPL-SCNC: 4.5 MMOL/L — SIGNIFICANT CHANGE UP (ref 3.5–5.3)
RBC # BLD: 3.22 M/UL — LOW (ref 3.8–5.2)
RBC # FLD: 12.6 % — SIGNIFICANT CHANGE UP (ref 10.3–14.5)
SODIUM SERPL-SCNC: 127 MMOL/L — LOW (ref 135–145)
SODIUM SERPL-SCNC: 127 MMOL/L — LOW (ref 135–145)
SODIUM SERPL-SCNC: 128 MMOL/L — LOW (ref 135–145)
SODIUM SERPL-SCNC: 129 MMOL/L — LOW (ref 135–145)
SODIUM SERPL-SCNC: 130 MMOL/L — LOW (ref 135–145)
WBC # BLD: 6.39 K/UL — SIGNIFICANT CHANGE UP (ref 3.8–10.5)
WBC # FLD AUTO: 6.39 K/UL — SIGNIFICANT CHANGE UP (ref 3.8–10.5)

## 2019-08-15 PROCEDURE — 99233 SBSQ HOSP IP/OBS HIGH 50: CPT | Mod: GC

## 2019-08-15 PROCEDURE — 76770 US EXAM ABDO BACK WALL COMP: CPT | Mod: 26

## 2019-08-15 PROCEDURE — 99232 SBSQ HOSP IP/OBS MODERATE 35: CPT

## 2019-08-15 RX ORDER — SODIUM CHLORIDE 9 MG/ML
1 INJECTION INTRAMUSCULAR; INTRAVENOUS; SUBCUTANEOUS
Refills: 0 | Status: DISCONTINUED | OUTPATIENT
Start: 2019-08-15 | End: 2019-08-16

## 2019-08-15 RX ADMIN — LEVETIRACETAM 500 MILLIGRAM(S): 250 TABLET, FILM COATED ORAL at 06:48

## 2019-08-15 RX ADMIN — ATENOLOL 25 MILLIGRAM(S): 25 TABLET ORAL at 18:54

## 2019-08-15 RX ADMIN — ATORVASTATIN CALCIUM 10 MILLIGRAM(S): 80 TABLET, FILM COATED ORAL at 21:30

## 2019-08-15 RX ADMIN — LEVETIRACETAM 500 MILLIGRAM(S): 250 TABLET, FILM COATED ORAL at 18:54

## 2019-08-15 RX ADMIN — ATENOLOL 25 MILLIGRAM(S): 25 TABLET ORAL at 06:48

## 2019-08-15 RX ADMIN — SODIUM CHLORIDE 1 GRAM(S): 9 INJECTION INTRAMUSCULAR; INTRAVENOUS; SUBCUTANEOUS at 18:54

## 2019-08-15 RX ADMIN — AMLODIPINE BESYLATE 5 MILLIGRAM(S): 2.5 TABLET ORAL at 06:48

## 2019-08-15 RX ADMIN — Medication 50 MICROGRAM(S): at 06:48

## 2019-08-15 NOTE — PROGRESS NOTE ADULT - PROBLEM SELECTOR PLAN 5
- 7.2cm right adnexal cyst noted on ultrasound but it was also present on prior CT in 2015  - outpatient follow up - 7.2cm right adnexal cyst noted on ultrasound but it was also present on prior CT in 2015  - outpatient follow up recommended per discussion with daughter

## 2019-08-15 NOTE — PROGRESS NOTE ADULT - PROBLEM SELECTOR PLAN 2
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 Goal up to 10 mmol/L in 24 hours.  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 Goal up to 10 mmol/L in 24 hours.  - f/u renal for further recs whether to continue 1.5% Nacl vs salt tab

## 2019-08-15 NOTE — PROGRESS NOTE ADULT - SUBJECTIVE AND OBJECTIVE BOX
Patient is a 89y old  Female who presents with a chief complaint of Head trauma s/p fall (14 Aug 2019 12:52)      SUBJECTIVE / OVERNIGHT EVENTS: Patient denies any HA but feels fatigued. Denies any dizziness with standing. Bladder scan revealed 300cc urine.     MEDICATIONS  (STANDING):  amLODIPine   Tablet 5 milliGRAM(s) Oral daily  ATENolol  Tablet 25 milliGRAM(s) Oral every 12 hours  atorvastatin 10 milliGRAM(s) Oral at bedtime  levETIRAcetam 500 milliGRAM(s) Oral two times a day  levothyroxine 50 MICROGram(s) Oral daily    MEDICATIONS  (PRN):      Vital Signs Last 24 Hrs  T(C): 36.9 (15 Aug 2019 05:07), Max: 36.9 (15 Aug 2019 05:07)  T(F): 98.4 (15 Aug 2019 05:07), Max: 98.4 (15 Aug 2019 05:07)  HR: 99 (15 Aug 2019 05:07) (73 - 99)  BP: 142/73 (15 Aug 2019 05:07) (117/75 - 142/73)  BP(mean): --  RR: 18 (15 Aug 2019 05:07) (17 - 18)  SpO2: 95% (15 Aug 2019 05:07) (95% - 95%)  CAPILLARY BLOOD GLUCOSE        I&O's Summary    14 Aug 2019 07:  -  15 Aug 2019 07:00  --------------------------------------------------------  IN: 2095 mL / OUT: 1500 mL / NET: 595 mL    15 Aug 2019 07:01  -  15 Aug 2019 13:20  --------------------------------------------------------  IN: 110 mL / OUT: 125 mL / NET: -15 mL        PHYSICAL EXAM:  GENERAL: NAD  HEENT: neck supple  LUNG: Clear to auscultation bilaterally; No wheeze  HEART: S1, S2  ABDOMEN: Soft, Nontender, Nondistended, Bowel sounds present  EXTREMITIES: No leg edema  PSYCH: normal affect, calm  NEUROLOGY: AAO x 2-3, moves all extremities  SKIN: warm and dry      LABS:                        10.0   6.39  )-----------( 174      ( 15 Aug 2019 11:28 )             29.3     08-15    129<L>  |  98  |  13  ----------------------------<  92  3.9   |  23  |  0.70    Ca    7.9<L>      15 Aug 2019 07:53  Phos  1.7     08-14  Mg     1.4     -14    TPro  6.1  /  Alb  3.3  /  TBili  0.7  /  DBili  x   /  AST  20  /  ALT  14  /  AlkPhos  57  08-14      CARDIAC MARKERS ( 13 Aug 2019 14:08 )  x     / x     / 207 U/L / x     / x          Urinalysis Basic - ( 14 Aug 2019 08:48 )    Color: Light Yellow / Appearance: Clear / S.010 / pH: x  Gluc: x / Ketone: Trace  / Bili: Negative / Urobili: <2 mg/dL   Blood: x / Protein: Negative / Nitrite: Negative   Leuk Esterase: Negative / RBC: 1 /HPF / WBC 1 /HPF   Sq Epi: x / Non Sq Epi: 0 /HPF / Bacteria: Negative        RADIOLOGY & ADDITIONAL TESTS:    Imaging Personally Reviewed:    < from: CT Head No Cont (19 @ 15:56) >  Comparison is made to prior CT from 2019.    A hemorrhagic contusion with surrounding edema and mass effect is again   noted in the right temporal lobe, stable. An additional hemorrhagic   contusion involving the right inferior frontal lobe is also unchanged. A   tiny left inferior frontal contusion may also be present.    Scattered small subarachnoid hemorrhages are stable. Trace   intraventricular hemorrhage is unchanged. The ventricles are stable in   size.    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 Chronic white   matter changes and cerebral volume loss are again noted.    No new hemorrhages are noted over the time interval.    Air-fluid levels are again visualized in the maxillary sinuses. Bony wall   thickening is noted. These changes may reflect acute on chronic   sinusitis. Posttraumatic change could also be considered for the   air-fluid levels if there was direct facial trauma.    An acute fracture is noted traversing the mastoid portion of the right   temporal bone, with associated partial opacification the right mastoid   air cells and middle ear cavity, grossly stable.    IMPRESSION:    Stable intracranial hemorrhages.    Stable right temporal bone fracture.    < end of copied text >    < from: CT Cervical Spine No Cont (19 @ 15:56) >  IMPRESSION:    No evidence for acute cervical spine fracture. Degenerative changes as   described. If the patient remains symptomatic, consider cervical spine   MRI imaging follow-up.    < end of copied text >    < from: US Kidney and Bladder (08.15.19 @ 12:02) >  IMPRESSION:     A 7.2 cm right adnexal cyst. No hydronephrosis.    < end of copied text >    Consultant(s) Notes Reviewed:  renal    Care Discussed with Consultants/Other Providers: medicine NP(Zonia)

## 2019-08-15 NOTE — PROVIDER CONTACT NOTE (OTHER) - SITUATION
during AM rounds, physician had requested that pt have bladder scan performed to determine retention.  Pt voided (amount unknown) and post void scan showed ~300mL left in bladder

## 2019-08-15 NOTE — PROGRESS NOTE ADULT - PROBLEM SELECTOR PLAN 2
-CTH x3 shows hemorrhagic right temporal contusion w/ scattered SAH, no midline shift, no changes since initial presentation. No plans for surgery at this time.

## 2019-08-15 NOTE — PROGRESS NOTE ADULT - ASSESSMENT
89F w/ pmh HTN, HLD, hypothyroidism, and afib on eliquis who presents s/p fall with head trauma on 8/11, found to have hemorrhagic right temporal contusion with scattered SAH and hyponatremia to 121. As per daughter, pt's mental status currently below baseline after fall, but has also been worse for about one week. Sodium was 121 on initial presentation, now 129 after 3 L IV NS and 1.2 L 1.5% saline. Pt's mental status has markedly improve, currently at baseline according to daughter. No plans for surgical intervention at this time.

## 2019-08-15 NOTE — PROGRESS NOTE ADULT - SUBJECTIVE AND OBJECTIVE BOX
[facility] DIVISION OF KIDNEY DISEASES AND HYPERTENSION -- FOLLOW UP NOTE  --------------------------------------------------------------------------------    24 hour events/subjective: Patient seen and examined at the bedside. No acute events overnight. Mental status has further improved from yesterday.      PAST HISTORY  --------------------------------------------------------------------------------  No significant changes to PMH, PSH, FHx, SHx, unless otherwise noted    ALLERGIES & MEDICATIONS  --------------------------------------------------------------------------------  Allergies    No Known Allergies    Intolerances      Standing Inpatient Medications  amLODIPine   Tablet 5 milliGRAM(s) Oral daily  ATENolol  Tablet 25 milliGRAM(s) Oral every 12 hours  atorvastatin 10 milliGRAM(s) Oral at bedtime  levETIRAcetam 500 milliGRAM(s) Oral two times a day  levothyroxine 50 MICROGram(s) Oral daily    PRN Inpatient Medications      REVIEW OF SYSTEMS  --------------------------------------------------------------------------------  Gen: No fevers/chills  Head/Eyes/Ears/Mouth: No headache; Normal hearing; Normal vision    Respiratory: No dyspnea, cough  CV: No chest pain  GI: No abdominal pain, diarrhea, constipation, nausea, vomiting  : No increased frequency, dysuria  MSK: No joint pain/swelling; no edema    All other systems were reviewed and are negative, except as noted.    >>> <<<    VITALS/PHYSICAL EXAM  --------------------------------------------------------------------------------  T(C): 36.9 (08-15-19 @ 05:07), Max: 36.9 (08-15-19 @ 05:07)  HR: 99 (08-15-19 @ 05:07) (73 - 99)  BP: 142/73 (08-15-19 @ 05:07) (117/75 - 142/73)  RR: 18 (08-15-19 @ 05:07) (17 - 18)  SpO2: 95% (08-15-19 @ 05:07) (95% - 95%)  Wt(kg): --        08-14-19 @ 07:01  -  08-15-19 @ 07:00  --------------------------------------------------------  IN: 2095 mL / OUT: 1500 mL / NET: 595 mL    08-15-19 @ 07:01  -  08-15-19 @ 13:21  --------------------------------------------------------  IN: 110 mL / OUT: 125 mL / NET: -15 mL      Physical Exam:    Gen: NAD, lying in bed, aox3  HEENT: PERRL, supple neck, clear oropharynx  Pulm: CTA B/L, no rrw  CV: irregularly irregular, s1 s2 auscultated  Abd: +BS, soft, nontender/nondistended  : No suprapubic tenderness  LE: Warm, no clubbing, intact strength; no edema  Neuro: CN 2-12 intact, no facial droop, strength 5/5 in UE and LE b/L, difficulty following some commands, coordination intact      LABS/STUDIES  --------------------------------------------------------------------------------              10.0   6.39  >-----------<  174      [08-15-19 @ 11:28]              29.3     129  |  98  |  13  ----------------------------<  92      [08-15-19 @ 07:53]  3.9   |  23  |  0.70        Ca     7.9     [08-15-19 @ 07:53]      Mg     1.4     [08-14-19 @ 05:57]      Phos  1.7     [08-14-19 @ 05:57]    TPro  6.1  /  Alb  3.3  /  TBili  0.7  /  DBili  x   /  AST  20  /  ALT  14  /  AlkPhos  57  [08-14-19 @ 05:57]        Uric acid 3.2      [08-14-19 @ 12:34]        [08-13-19 @ 14:08]    Creatinine Trend:  SCr 0.70 [08-15 @ 07:53]  SCr 0.79 [08-15 @ 03:52]  SCr 0.85 [08-15 @ 00:51]  SCr 0.85 [08-14 @ 21:55]  SCr 0.71 [08-14 @ 19:33]    Urinalysis - [08-14-19 @ 08:48]      Color Light Yellow / Appearance Clear / SG 1.010 / pH 6.0      Gluc Negative / Ketone Trace  / Bili Negative / Urobili <2 mg/dL       Blood Negative / Protein Negative / Leuk Est Negative / Nitrite Negative      RBC 1 / WBC 1 / Hyaline 0 / Gran  / Sq Epi  / Non Sq Epi 0 / Bacteria Negative    Urine Creatinine 70      [08-13-19 @ 17:17]  Urine Sodium 105      [08-14-19 @ 04:54]  Urine Urea Nitrogen 515      [08-13-19 @ 22:16]  Urine Potassium 36      [08-13-19 @ 17:17]  Urine Osmolality 423      [08-14-19 @ 12:54]    TSH 2.16      [08-14-19 @ 08:49]                < from: US Kidney and Bladder (08.15.19 @ 12:02) >    IMPRESSION:     A 7.2 cm right adnexal cyst. No hydronephrosis. [facility] DIVISION OF KIDNEY DISEASES AND HYPERTENSION -- FOLLOW UP NOTE  --------------------------------------------------------------------------------    24 hour events/subjective: Patient seen and examined at the bedside. No acute events overnight. Mental status has further improved from yesterday.      PAST HISTORY  --------------------------------------------------------------------------------  No significant changes to PMH, PSH, FHx, SHx, unless otherwise noted    ALLERGIES & MEDICATIONS  --------------------------------------------------------------------------------  Allergies    No Known Allergies    Intolerances      Standing Inpatient Medications  amLODIPine   Tablet 5 milliGRAM(s) Oral daily  ATENolol  Tablet 25 milliGRAM(s) Oral every 12 hours  atorvastatin 10 milliGRAM(s) Oral at bedtime  levETIRAcetam 500 milliGRAM(s) Oral two times a day  levothyroxine 50 MICROGram(s) Oral daily    PRN Inpatient Medications      REVIEW OF SYSTEMS  --------------------------------------------------------------------------------  Gen: No fevers/chills  Head/Eyes/Ears/Mouth: pain at site of occipital laceration, no headache. Poor auditory acuity at baseline.  Respiratory: No dyspnea, cough  CV: No chest pain  GI: No abdominal pain, diarrhea, constipation, nausea, vomiting  : No increased frequency, dysuria  MSK: No joint pain/swelling; no edema    >>> <<<    VITALS/PHYSICAL EXAM  --------------------------------------------------------------------------------  T(C): 36.9 (08-15-19 @ 05:07), Max: 36.9 (08-15-19 @ 05:07)  HR: 99 (08-15-19 @ 05:07) (73 - 99)  BP: 142/73 (08-15-19 @ 05:07) (117/75 - 142/73)  RR: 18 (08-15-19 @ 05:07) (17 - 18)  SpO2: 95% (08-15-19 @ 05:07) (95% - 95%)  Wt(kg): --        08-14-19 @ 07:01  -  08-15-19 @ 07:00  --------------------------------------------------------  IN: 2095 mL / OUT: 1500 mL / NET: 595 mL    08-15-19 @ 07:01  -  08-15-19 @ 13:21  --------------------------------------------------------  IN: 110 mL / OUT: 125 mL / NET: -15 mL      Physical Exam:    Gen: NAD, lying in bed, aox3  HEENT: PERRL, supple neck, clear oropharynx  Pulm: CTA B/L, no rrw  CV: irregularly irregular, s1 s2 auscultated  Abd: +BS, soft, nontender/nondistended  : No suprapubic tenderness  LE: Warm, no clubbing, intact strength; no edema  Neuro: CN 2-12 intact, no facial droop, strength 5/5 in UE and LE b/L, difficulty following some commands, coordination intact      LABS/STUDIES  --------------------------------------------------------------------------------              10.0   6.39  >-----------<  174      [08-15-19 @ 11:28]              29.3     129  |  98  |  13  ----------------------------<  92      [08-15-19 @ 07:53]  3.9   |  23  |  0.70        Ca     7.9     [08-15-19 @ 07:53]      Mg     1.4     [08-14-19 @ 05:57]      Phos  1.7     [08-14-19 @ 05:57]    TPro  6.1  /  Alb  3.3  /  TBili  0.7  /  DBili  x   /  AST  20  /  ALT  14  /  AlkPhos  57  [08-14-19 @ 05:57]        Uric acid 3.2      [08-14-19 @ 12:34]        [08-13-19 @ 14:08]    Creatinine Trend:  SCr 0.70 [08-15 @ 07:53]  SCr 0.79 [08-15 @ 03:52]  SCr 0.85 [08-15 @ 00:51]  SCr 0.85 [08-14 @ 21:55]  SCr 0.71 [08-14 @ 19:33]    Urinalysis - [08-14-19 @ 08:48]      Color Light Yellow / Appearance Clear / SG 1.010 / pH 6.0      Gluc Negative / Ketone Trace  / Bili Negative / Urobili <2 mg/dL       Blood Negative / Protein Negative / Leuk Est Negative / Nitrite Negative      RBC 1 / WBC 1 / Hyaline 0 / Gran  / Sq Epi  / Non Sq Epi 0 / Bacteria Negative    Urine Creatinine 70      [08-13-19 @ 17:17]  Urine Sodium 105      [08-14-19 @ 04:54]  Urine Urea Nitrogen 515      [08-13-19 @ 22:16]  Urine Potassium 36      [08-13-19 @ 17:17]  Urine Osmolality 423      [08-14-19 @ 12:54]    TSH 2.16      [08-14-19 @ 08:49]                < from: US Kidney and Bladder (08.15.19 @ 12:02) >    IMPRESSION:     A 7.2 cm right adnexal cyst. No hydronephrosis.

## 2019-08-15 NOTE — PROGRESS NOTE ADULT - PROBLEM SELECTOR PLAN 1
-acute on chronic hyponatremia, pt has had hyponatremia in past based on clinic lab values.   -s/p NS and 1.5% saline, Na is now 129. Fluids have been d/imtiaz.   -daily CMP  -repeat urine osmole, urine sodium daily, and urine creatinine daily   -allow pt to drink water PO.

## 2019-08-15 NOTE — PROGRESS NOTE ADULT - ASSESSMENT
89 y/o F w/ PMH of atrial fibrillation on Eliquis, CKD Stage 3, HTN, HLD, and hypothyroidism, transferred from [place] for inferior frontal and right temporal hemorrhagic contusion with cerebral edema and brain compression, scattered SAH/IVH s/p fall. Patient noted with MELVIN on CKD, hyponatremia, orthostatic hypotension. 89 y/o F w/ PMH of GI stromal tumor s/p partial gastrectomy, IVC filter(unclear reason but placed during periop period for gastrectomy per daughter), atrial fibrillation on Eliquis, CKD Stage 3, HTN, HLD, and hypothyroidism, transferred from [place] for inferior frontal and right temporal hemorrhagic contusion with cerebral edema and brain compression, scattered SAH/IVH s/p fall. Patient noted with MELVIN on CKD, hyponatremia, orthostatic hypotension.

## 2019-08-15 NOTE — PROGRESS NOTE ADULT - PROBLEM SELECTOR PLAN 1
- S/p mechanical fall, occipital laceration repaired OSH.   - + orthostatic hypotension, though, no symptoms reported. s/p IVF. will continue to monitor for now  - CT head revealing inferior frontal and anterior right temporal lobe hemorrhagic contusions with edema and mass effect and scattered SAH  - S/p 1g Keppra load. Will continue with Keppra 500 mg PO BID x 7 days  - Neurosurg consulted. No surgical intervention at this time. Will hold Eliquis for 2 weeks.  - Pain management with Tylenol. Avoid NSAIDs in the setting of bleeding  - repeat CT head with stable changes and CT c-spine with no acute fracture

## 2019-08-16 ENCOUNTER — TRANSCRIPTION ENCOUNTER (OUTPATIENT)
Age: 84
End: 2019-08-16

## 2019-08-16 VITALS
SYSTOLIC BLOOD PRESSURE: 122 MMHG | TEMPERATURE: 98 F | HEART RATE: 66 BPM | DIASTOLIC BLOOD PRESSURE: 72 MMHG | RESPIRATION RATE: 18 BRPM | OXYGEN SATURATION: 98 %

## 2019-08-16 DIAGNOSIS — S06.340A TRAUMATIC HEMORRHAGE OF RIGHT CEREBRUM WITHOUT LOSS OF CONSCIOUSNESS, INITIAL ENCOUNTER: ICD-10-CM

## 2019-08-16 LAB
ANION GAP SERPL CALC-SCNC: 13 MMOL/L — SIGNIFICANT CHANGE UP (ref 5–17)
BUN SERPL-MCNC: 17 MG/DL — SIGNIFICANT CHANGE UP (ref 7–23)
CALCIUM SERPL-MCNC: 8.3 MG/DL — LOW (ref 8.4–10.5)
CHLORIDE SERPL-SCNC: 95 MMOL/L — LOW (ref 96–108)
CO2 SERPL-SCNC: 22 MMOL/L — SIGNIFICANT CHANGE UP (ref 22–31)
CREAT SERPL-MCNC: 0.82 MG/DL — SIGNIFICANT CHANGE UP (ref 0.5–1.3)
GLUCOSE SERPL-MCNC: 98 MG/DL — SIGNIFICANT CHANGE UP (ref 70–99)
MAGNESIUM SERPL-MCNC: 1.7 MG/DL — SIGNIFICANT CHANGE UP (ref 1.6–2.6)
POTASSIUM SERPL-MCNC: 4.1 MMOL/L — SIGNIFICANT CHANGE UP (ref 3.5–5.3)
POTASSIUM SERPL-SCNC: 4.1 MMOL/L — SIGNIFICANT CHANGE UP (ref 3.5–5.3)
SODIUM SERPL-SCNC: 130 MMOL/L — LOW (ref 135–145)

## 2019-08-16 PROCEDURE — 85610 PROTHROMBIN TIME: CPT

## 2019-08-16 PROCEDURE — 99285 EMERGENCY DEPT VISIT HI MDM: CPT | Mod: 25

## 2019-08-16 PROCEDURE — 83735 ASSAY OF MAGNESIUM: CPT

## 2019-08-16 PROCEDURE — 86900 BLOOD TYPING SEROLOGIC ABO: CPT

## 2019-08-16 PROCEDURE — 83930 ASSAY OF BLOOD OSMOLALITY: CPT

## 2019-08-16 PROCEDURE — 70450 CT HEAD/BRAIN W/O DYE: CPT

## 2019-08-16 PROCEDURE — 99232 SBSQ HOSP IP/OBS MODERATE 35: CPT | Mod: GC

## 2019-08-16 PROCEDURE — 84133 ASSAY OF URINE POTASSIUM: CPT

## 2019-08-16 PROCEDURE — 72125 CT NECK SPINE W/O DYE: CPT

## 2019-08-16 PROCEDURE — 84100 ASSAY OF PHOSPHORUS: CPT

## 2019-08-16 PROCEDURE — 84540 ASSAY OF URINE/UREA-N: CPT

## 2019-08-16 PROCEDURE — 84443 ASSAY THYROID STIM HORMONE: CPT

## 2019-08-16 PROCEDURE — 80048 BASIC METABOLIC PNL TOTAL CA: CPT

## 2019-08-16 PROCEDURE — 76770 US EXAM ABDO BACK WALL COMP: CPT

## 2019-08-16 PROCEDURE — 85730 THROMBOPLASTIN TIME PARTIAL: CPT

## 2019-08-16 PROCEDURE — 97161 PT EVAL LOW COMPLEX 20 MIN: CPT

## 2019-08-16 PROCEDURE — 93005 ELECTROCARDIOGRAM TRACING: CPT

## 2019-08-16 PROCEDURE — 83935 ASSAY OF URINE OSMOLALITY: CPT

## 2019-08-16 PROCEDURE — 86901 BLOOD TYPING SEROLOGIC RH(D): CPT

## 2019-08-16 PROCEDURE — 84300 ASSAY OF URINE SODIUM: CPT

## 2019-08-16 PROCEDURE — 82550 ASSAY OF CK (CPK): CPT

## 2019-08-16 PROCEDURE — 96374 THER/PROPH/DIAG INJ IV PUSH: CPT

## 2019-08-16 PROCEDURE — 85027 COMPLETE CBC AUTOMATED: CPT

## 2019-08-16 PROCEDURE — 84550 ASSAY OF BLOOD/URIC ACID: CPT

## 2019-08-16 PROCEDURE — 81001 URINALYSIS AUTO W/SCOPE: CPT

## 2019-08-16 PROCEDURE — 99239 HOSP IP/OBS DSCHRG MGMT >30: CPT

## 2019-08-16 PROCEDURE — 82570 ASSAY OF URINE CREATININE: CPT

## 2019-08-16 PROCEDURE — 80053 COMPREHEN METABOLIC PANEL: CPT

## 2019-08-16 PROCEDURE — 86850 RBC ANTIBODY SCREEN: CPT

## 2019-08-16 RX ORDER — SODIUM CHLORIDE 9 MG/ML
1 INJECTION INTRAMUSCULAR; INTRAVENOUS; SUBCUTANEOUS
Qty: 14 | Refills: 0
Start: 2019-08-16 | End: 2019-08-22

## 2019-08-16 RX ORDER — SODIUM CHLORIDE 9 MG/ML
1 INJECTION INTRAMUSCULAR; INTRAVENOUS; SUBCUTANEOUS
Qty: 0 | Refills: 0 | DISCHARGE
Start: 2019-08-16

## 2019-08-16 RX ORDER — LISINOPRIL 2.5 MG/1
1 TABLET ORAL
Qty: 0 | Refills: 0 | DISCHARGE

## 2019-08-16 RX ORDER — LEVETIRACETAM 250 MG/1
1 TABLET, FILM COATED ORAL
Qty: 0 | Refills: 0 | DISCHARGE
Start: 2019-08-16

## 2019-08-16 RX ORDER — APIXABAN 2.5 MG/1
1 TABLET, FILM COATED ORAL
Qty: 0 | Refills: 0 | DISCHARGE

## 2019-08-16 RX ORDER — LEVETIRACETAM 250 MG/1
1 TABLET, FILM COATED ORAL
Qty: 9 | Refills: 0
Start: 2019-08-16 | End: 2019-08-20

## 2019-08-16 RX ADMIN — LEVETIRACETAM 500 MILLIGRAM(S): 250 TABLET, FILM COATED ORAL at 05:48

## 2019-08-16 RX ADMIN — ATENOLOL 25 MILLIGRAM(S): 25 TABLET ORAL at 05:48

## 2019-08-16 RX ADMIN — AMLODIPINE BESYLATE 5 MILLIGRAM(S): 2.5 TABLET ORAL at 05:48

## 2019-08-16 RX ADMIN — SODIUM CHLORIDE 1 GRAM(S): 9 INJECTION INTRAMUSCULAR; INTRAVENOUS; SUBCUTANEOUS at 05:48

## 2019-08-16 RX ADMIN — Medication 50 MICROGRAM(S): at 05:48

## 2019-08-16 NOTE — PROGRESS NOTE ADULT - SUBJECTIVE AND OBJECTIVE BOX
[facility] DIVISION OF KIDNEY DISEASES AND HYPERTENSION -- FOLLOW UP NOTE  --------------------------------------------------------------------------------    24 hour events/subjective: Patient seen and examined at the bedside. No acute overnight events. Na this am 130        PAST HISTORY  --------------------------------------------------------------------------------  No significant changes to PMH, PSH, FHx, SHx, unless otherwise noted    ALLERGIES & MEDICATIONS  --------------------------------------------------------------------------------  Allergies    No Known Allergies    Intolerances      Standing Inpatient Medications  amLODIPine   Tablet 5 milliGRAM(s) Oral daily  ATENolol  Tablet 25 milliGRAM(s) Oral every 12 hours  atorvastatin 10 milliGRAM(s) Oral at bedtime  levETIRAcetam 500 milliGRAM(s) Oral two times a day  levothyroxine 50 MICROGram(s) Oral daily  sodium chloride 1 Gram(s) Oral two times a day    PRN Inpatient Medications      REVIEW OF SYSTEMS  --------------------------------------------------------------------------------  Gen: No fevers/chills  Head/Eyes/Ears/Mouth: No headache; Normal hearing; Normal vision    Respiratory: No dyspnea, cough  CV: No chest pain  GI: No abdominal pain, diarrhea, constipation, nausea, vomiting  : No increased frequency, dysuria  MSK: No joint pain/swelling; no edema    All other systems were reviewed and are negative, except as noted.    >>> <<<    VITALS/PHYSICAL EXAM  --------------------------------------------------------------------------------  T(C): 36.7 (08-16-19 @ 05:42), Max: 36.7 (08-15-19 @ 21:15)  HR: 87 (08-16-19 @ 05:42) (86 - 87)  BP: 132/72 (08-16-19 @ 05:42) (132/72 - 141/85)  RR: 18 (08-16-19 @ 05:42) (17 - 18)  SpO2: 98% (08-16-19 @ 05:42) (97% - 99%)  Wt(kg): --        08-15-19 @ 07:01  -  08-16-19 @ 07:00  --------------------------------------------------------  IN: 460 mL / OUT: 750 mL / NET: -290 mL      Physical Exam:    	Gen: NAD, well-appearing  	HEENT: PERRL, supple neck, clear oropharynx  	Pulm: CTA B/L  	CV: RRR, S1S2; no rub  	Abd: +BS, soft, nontender/nondistended       	: No suprapubic tenderness  	LE: Warm, no clubbing, intact strength; no edema  	Neuro: No focal deficits, AOX3, no asterexis      	Vascular Access:      LABS/STUDIES  --------------------------------------------------------------------------------              10.0   6.39  >-----------<  174      [08-15-19 @ 11:28]              29.3     130  |  95  |  17  ----------------------------<  98      [08-16-19 @ 07:00]  4.1   |  22  |  0.82        Ca     8.3     [08-16-19 @ 07:00]      Mg     1.7     [08-16-19 @ 07:00]          Uric acid 3.2      [08-14-19 @ 12:34]    Creatinine Trend:  SCr 0.82 [08-16 @ 07:00]  SCr 0.96 [08-15 @ 20:22]  SCr 0.77 [08-15 @ 13:36]  SCr 0.70 [08-15 @ 07:53]  SCr 0.79 [08-15 @ 03:52]    Urinalysis - [08-14-19 @ 08:48]      Color Light Yellow / Appearance Clear / SG 1.010 / pH 6.0      Gluc Negative / Ketone Trace  / Bili Negative / Urobili <2 mg/dL       Blood Negative / Protein Negative / Leuk Est Negative / Nitrite Negative      RBC 1 / WBC 1 / Hyaline 0 / Gran  / Sq Epi  / Non Sq Epi 0 / Bacteria Negative    Urine Creatinine 70      [08-13-19 @ 17:17]  Urine Sodium 105      [08-14-19 @ 04:54]  Urine Urea Nitrogen 515      [08-13-19 @ 22:16]  Urine Potassium 36      [08-13-19 @ 17:17]  Urine Osmolality 423      [08-14-19 @ 12:54]    TSH 2.16      [08-14-19 @ 08:49]

## 2019-08-16 NOTE — PROGRESS NOTE ADULT - REASON FOR ADMISSION
Head trauma s/p fall

## 2019-08-16 NOTE — PROGRESS NOTE ADULT - PROBLEM SELECTOR PLAN 6
- History of a-fib. Patient is on Eliquis 2.5 mg PO BID and atenolol 25 mg PO BID at home  - Holding Eliquis in light of intracranial bleeding. Risk of intracranial bleeding outweighs benefit at this time
- History of a-fib. Patient is on Eliquis 2.5 mg PO BID and atenolol 25 mg PO BID at home  - Holding Eliquis in light of intracranial bleeding. Risk of intracranial bleeding outweighs benefit at this time
- Patient takes atorvastatin 10 mg PO QHS at home  - Will continue with home dose
-c/w home levothyroxine

## 2019-08-16 NOTE — DISCHARGE NOTE PROVIDER - HOSPITAL COURSE
89 y/o F w/ PMH of GI stromal tumor s/p partial gastrectomy, IVC filter(unclear reason but placed during periop period for gastrectomy per daughter), atrial fibrillation on Eliquis, CKD Stage 3, HTN, HLD, and hypothyroidism, transferred from [place] for inferior frontal and right temporal hemorrhagic contusion with cerebral edema and brain compression, scattered SAH/IVH s/p fall. Patient noted with MELVIN on CKD, hyponatremia, orthostatic hypotension. 89 y/o F w/ PMH of GI stromal tumor s/p partial gastrectomy, IVC filter(unclear reason but placed during periop period for gastrectomy per daughter), atrial fibrillation on Eliquis, CKD Stage 3, HTN, HLD, and hypothyroidism, transferred from [place] for inferior frontal and right temporal hemorrhagic contusion with cerebral edema and brain compression, scattered SAH/IVH s/p fall. Patient noted with MELVIN on CKD, hyponatremia, orthostatic hypotension.         Problem/Plan - 1:    ·  Problem: Traumatic hemorrhage of right cerebrum without loss of consciousness, initial encounter.      - S/p mechanical fall, occipital laceration repaired OSH.     - + orthostatic hypotension, though, no symptoms reported. s/p IVF.    - continue with Keppra 500 mg PO BID x 7 days for seizure prophylaxis    - No surgical intervention at this time per neurosurgery. Will hold Eliquis for 2 weeks.    - repeat CT head with stable changes and CT c-spine with no acute fracture.          Problem/Plan - 2:    ·  Problem: Hyponatremia.      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          Problem/Plan - 3:    ·  Problem: Acute kidney injury superimposed on chronic kidney disease.      - SCr 1.74 on admission    - CKD stage 3 at baseline on outpatient record.    - S/p 3L NS in ED with improvement in renal function         Problem/Plan - 4:    ·  Problem: Urinary retention.     - patient had episode of urinary retention requiring straigh cath but she was able to urinate on her own thereafter.    - no hydro on renal ultrasound         Problem/Plan - 5:    ·  Problem: Adnexal cyst.      - 7.2cm right adnexal cyst noted on ultrasound but it was also present on prior CT in 2015    - outpatient follow up recommended per discussion with daughter.          Problem/Plan - 6:    Problem: Atrial fibrillation.     - History of a-fib. Patient is on Eliquis 2.5 mg PO BID and atenolol 25 mg PO BID at home    - Holding Eliquis in light of intracranial bleeding. Risk of intracranial bleeding outweighs benefit at this time.         Problem/Plan - 7:    ·  Problem: HTN (hypertension).      - Continue with atenolol, amlodipine     - Hold ACEI in the setting of hyponatremia.             PT eval--> ELISA recommended but family opted to take patient home.

## 2019-08-16 NOTE — PROGRESS NOTE ADULT - PROBLEM SELECTOR PLAN 10
- DVT PPx: SCDs  - Full Code  - Diet: DASH/TLC  - Fall precautions. PT eval--> ELISA recommended.
- DVT PPx: SCDs  - Full Code  - Diet: DASH/TLC  - Fall precautions. PT eval--> ELISA recommended but family wants to take patient home. anticipate d/c home today pending PT re-eval and when family arrives. d/c time 45 mins.

## 2019-08-16 NOTE — PROGRESS NOTE ADULT - PROBLEM SELECTOR PROBLEM 1
Hyponatremia
Traumatic hemorrhage of right cerebrum without loss of consciousness, initial encounter
Hyponatremia
Hyponatremia
Traumatic hemorrhage of right cerebrum without loss of consciousness, initial encounter
Traumatic hemorrhage of right cerebrum without loss of consciousness, initial encounter

## 2019-08-16 NOTE — PROGRESS NOTE ADULT - PROBLEM SELECTOR PROBLEM 6
Hypothyroidism
Atrial fibrillation
Atrial fibrillation
HLD (hyperlipidemia)
Hypothyroidism
Hypothyroidism

## 2019-08-16 NOTE — PROGRESS NOTE ADULT - PROBLEM SELECTOR PLAN 3
- Cr 1.74 on initial presentation, baseline around 1.2 (CKD stage 3)  - Creatinine now 0.70 after IV fluids  - Renal sonogram shows 7.2 cm right adnexal cyst, unchanged from 2015.   - Continue to trend BMP.
- SCr 1.74 on admission  - CKD stage 3 at baseline on outpatient record.  - S/p 3L NS in ED with improvement in renal function  - Will continue to monitor
- Cr 1.74 on initial presentation, baseline around 1.2 (CKD stage 3)  - Creatinine now 0.70 after IV fluids  - Renal sonogram shows 7.2 cm right adnexal cyst, unchanged from 2015.   - Continue to trend BMP.  - CK level 207, trend daily
- SCr 1.74 on admission  - CKD stage 3 at baseline on outpatient record.  - S/p 3L NS in ED with improvement in renal function  - Will continue to monitor
- SCr 1.74 on admission  - CKD stage 3 at baseline on outpatient record.  - S/p 3L NS in ED with improvement in renal function  - Will continue to monitor
- Cr 1.74 on initial presentation, baseline around 1.2 (CKD stage 3)  - Creatinine now 0.72 after IV fluids  - Renal sonogram  - Continue to trend BMP.  - CK level 207, trend daily

## 2019-08-16 NOTE — PROGRESS NOTE ADULT - SUBJECTIVE AND OBJECTIVE BOX
Patient is a 89y old  Female who presents with a chief complaint of Head trauma s/p fall (15 Aug 2019 13:20)      SUBJECTIVE / OVERNIGHT EVENTS: patient denies any HA. patient was able to urinate on her own and had BM this morning.     MEDICATIONS  (STANDING):  amLODIPine   Tablet 5 milliGRAM(s) Oral daily  ATENolol  Tablet 25 milliGRAM(s) Oral every 12 hours  atorvastatin 10 milliGRAM(s) Oral at bedtime  levETIRAcetam 500 milliGRAM(s) Oral two times a day  levothyroxine 50 MICROGram(s) Oral daily  sodium chloride 1 Gram(s) Oral two times a day    MEDICATIONS  (PRN):      Vital Signs Last 24 Hrs  T(C): 36.7 (16 Aug 2019 05:42), Max: 36.7 (15 Aug 2019 21:15)  T(F): 98 (16 Aug 2019 05:42), Max: 98 (15 Aug 2019 21:15)  HR: 87 (16 Aug 2019 05:42) (86 - 87)  BP: 132/72 (16 Aug 2019 05:42) (132/72 - 141/85)  BP(mean): --  RR: 18 (16 Aug 2019 05:42) (17 - 18)  SpO2: 98% (16 Aug 2019 05:42) (97% - 99%)  CAPILLARY BLOOD GLUCOSE        I&O's Summary    15 Aug 2019 07:01  -  16 Aug 2019 07:00  --------------------------------------------------------  IN: 460 mL / OUT: 750 mL / NET: -290 mL        PHYSICAL EXAM:  GENERAL: NAD  HEENT: neck supple  LUNG: Clear to auscultation bilaterally; No wheeze  HEART: S1, S2  ABDOMEN: Soft, Nontender, Nondistended, Bowel sounds present  EXTREMITIES: No leg edema  PSYCH: normal affect, calm  NEUROLOGY: AAO x2-3, moves all extremities  SKIN: warm and dry      LABS:                        10.0   6.39  )-----------( 174      ( 15 Aug 2019 11:28 )             29.3     08-16    130<L>  |  95<L>  |  17  ----------------------------<  98  4.1   |  22  |  0.82    Ca    8.3<L>      16 Aug 2019 07:00  Mg     1.7     08-16                RADIOLOGY & ADDITIONAL TESTS:    Imaging Personally Reviewed:    Consultant(s) Notes Reviewed:  renal    Care Discussed with Consultants/Other Providers: medicine NP, , renal fellow (Dr. Marx)

## 2019-08-16 NOTE — PROGRESS NOTE ADULT - PROBLEM SELECTOR PLAN 4
- bladder scan with 300cc at bedside. renal ultrasound with no hydro.  - straight cath if no void
- History of a-fib. Patient is on Eliquis 2.5 mg PO BID and atenolol 25 mg PO BID at home  - Holding Eliquis in light of intracranial bleeding. Risk of intracranial bleeding outweighs benefit at this time
- no hydro on renal ultrasound  - patient reportedly now urinating on her own
-c/w home meds atenolol and amlodipine. Hold lisinopril in setting of hyponatremia.

## 2019-08-16 NOTE — PROGRESS NOTE ADULT - PROBLEM SELECTOR PROBLEM 3
Acute on chronic kidney failure
Acute kidney injury superimposed on chronic kidney disease
Acute on chronic kidney failure
Acute kidney injury superimposed on chronic kidney disease
Acute kidney injury superimposed on chronic kidney disease
Acute on chronic kidney failure

## 2019-08-16 NOTE — PROGRESS NOTE ADULT - PROBLEM SELECTOR PROBLEM 2
Hyponatremia
Traumatic hemorrhage of right cerebrum
Traumatic hemorrhage of right cerebrum
Hyponatremia
Hyponatremia
Traumatic hemorrhage of right cerebrum

## 2019-08-16 NOTE — PROGRESS NOTE ADULT - PROBLEM SELECTOR PLAN 1
-acute on chronic hyponatremia, pt has had hyponatremia in past based on clinic lab values.   -Na is now 130. Likely 2/2 poor po intake with component of SIADH  - C/w salt tabs 1g BID  - Will need to have repeat BMP in the next few days at rehab

## 2019-08-16 NOTE — PROGRESS NOTE ADULT - PROBLEM SELECTOR PROBLEM 4
Hypertension
Hypertension
R/O Urinary retention
Atrial fibrillation
Hypertension
R/O Urinary retention

## 2019-08-16 NOTE — PROGRESS NOTE ADULT - ATTENDING COMMENTS
I have seen this patient with the fellow and agree with their assessment and plan. In addition,    S: Patient is doing well overall. Good appetite.  O: /72, HR 87. Lungs-clear, CVS - no pericardial rub. Abd - soft, non tender, LE - no edema    A/P:    #Hyponatreamia- secondary to SIADH + poor solute intake. Now sodium improved to 130. Continue with salt tabs 1g BID. Gettign discharged to Rehab. For a repeat BMP in a few days.    # Hypertension - BP controlled. Continue amlodipine and atenolol.    The rest of the recommendations as per fellow's note.    Sherly Waterman MD  Attending Nephrologist  [phone]    For weekend coverage, please call Dr. Joaquim Yanez( fellow) and Attending Dr Esther Hummel
I have seen this patient with the fellow and agree with their assessment and plan. In addition,    S: patient states that she is feelign well. She has a good appetite. No n/v/ pain.    O: Alert, in no distress. Moist oral mucosa. Lungs clear. CVS - no rub. LE - no edema    A/P:  1. Hyponatremia. She had acute on chronic hyponatremia. 1.5% saline was discontinued. As of 4pm, her serum sodium had improved to 130. Continue to encourage food intake.    2. MELVIN - secondary to volume depletion. Now resolved. Serum creatinine is now 0.7.    3. Hypertension - BP is controlled. Continue amlodine and atenolol.    The rest of the recommendations as per fellow's note.     Sherly Waterman MD  Attending Nephrologist  [phone]
#Acute on Chronic Hyponatremia- likely hypovolemic on admission and now component of SIADH (neurologic issues SDH)  -repeat u osm and u na high  -sodium downtrended; plan 1.5% at 75cc/hr;   -water restrict  -monitor sodium q3hr stat and call if downtrends or if it uptrend rapidly; plan for today is to keep sodium target at approx 129-130   #lamar- on admission in the setting of ace/volume depletion  -hold ace  -lamar improved  #htn- restart bp meds  #neuro follow up  #check ua, renal sono- daughter states pt has lesion on her kidneys

## 2019-08-16 NOTE — DISCHARGE NOTE PROVIDER - NSDCFUADDINST_GEN_ALL_CORE_FT
Holding Eliquis in light of intracranial bleeding. Risk of intracranial bleeding outweighs benefit at this time.  Follow-up with your primary care physician within 1 week. Call for appointment.  Please bring all discharge paperwork and list of medications to all follow up appointments  Please call for follow up appoints one day after discharge

## 2019-08-16 NOTE — PROGRESS NOTE ADULT - PROBLEM SELECTOR PLAN 2
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 Marx), ok with serum sodium of 130 prior to discharge.

## 2019-08-16 NOTE — PROGRESS NOTE ADULT - PROBLEM SELECTOR PLAN 5
- 7.2cm right adnexal cyst noted on ultrasound but it was also present on prior CT in 2015  - outpatient follow up recommended per discussion with daughter

## 2019-08-16 NOTE — PROGRESS NOTE ADULT - PROBLEM SELECTOR PLAN 8
- Patient takes atorvastatin 10 mg PO QHS at home  - Will continue with home dose
- DVT PPx: SCDs  - Full Code  - Diet: DASH/TLC  - Fall precautions. PT eval--> ELISA recommended.
- Patient takes atorvastatin 10 mg PO QHS at home  - Will continue with home dose

## 2019-08-16 NOTE — PROGRESS NOTE ADULT - ASSESSMENT
89 y/o F w/ PMH of GI stromal tumor s/p partial gastrectomy, IVC filter(unclear reason but placed during periop period for gastrectomy per daughter), atrial fibrillation on Eliquis, CKD Stage 3, HTN, HLD, and hypothyroidism, transferred from [place] for inferior frontal and right temporal hemorrhagic contusion with cerebral edema and brain compression, scattered SAH/IVH s/p fall. Patient noted with MELVIN on CKD, hyponatremia, orthostatic hypotension.

## 2019-08-16 NOTE — DISCHARGE NOTE PROVIDER - NSDCCPCAREPLAN_GEN_ALL_CORE_FT
PRINCIPAL DISCHARGE DIAGNOSIS  Diagnosis: Traumatic hemorrhage of right cerebrum without loss of consciousness, initial encounter  Assessment and Plan of Treatment: S/p mechanical fall, occipital laceration repaired OSH.   - + orthostatic hypotension, though, no symptoms reported. s/p IVF. will continue to monitor for now  - CT head revealing inferior frontal and anterior right temporal lobe hemorrhagic contusions with edema and mass effect and scattered SAH  - S/p 1g Keppra load. Will continue with Keppra 500 mg PO BID x 7 days  - Neurosurg consulted. No surgical intervention at this time. Will hold Eliquis for 2 weeks.  - Pain management with Tylenol. Avoid NSAIDs in the setting of bleeding  - repeat CT head with stable changes and CT c-spine with no acute fracture.      SECONDARY DISCHARGE DIAGNOSES  Diagnosis: Hyponatremia  Assessment and Plan of Treatment: Hyponatremia uspect due to SIADH  - Patient is noted to have chronically low serum Na since Aug 2018, however WNL previously  - Nephrology following and s/p course of 1.5% Nacl IVF with improvement in serum Na, now on salt tab 1g BID  - Per discussion with renal fellow(Dr. Marx), ok with serum sodium of 130 prior to discharge.    Diagnosis: Adnexal cyst  Assessment and Plan of Treatment: Adnexal cyst  Plan: - 7.2cm right adnexal cyst noted on ultrasound but it was also present on prior CT in 2015  - outpatient follow up recommended per discussion with daughter.    Diagnosis: Afib  Assessment and Plan of Treatment: Afib  Holding Eliquis in light of intracranial bleeding. Risk of intracranial bleeding outweighs benefit at this time.  Atrial fibrillation is the most common heart rhythm problem & has the risk of stroke & heart attack  It helps if you control your blood pressure, not drink more than 1-2 alcohol drinks per day, cut down on caffeine, getting treatment for over active thyroid gland, & getting exercise  Call your doctor if you feel your heart racing or beating unusually, chest tightness or pain, lightheaded, faint, shortness of breath especially with exercise  It is important to take your heart medication as prescribed  You may be on anticoagulation which is very important to take as directed - you may need blood work to monitor drug levels      Diagnosis: Acute on chronic kidney failure  Assessment and Plan of Treatment: Acute on chronic kidney failure  CKD stage 3 at baseline on outpatient record.  - S/p 3L NS in ED with improvement in renal function  Avoid taking (NSAIDs) - (ex: Ibuprofen, Advil, Celebrex, Naprosyn)  Avoid taking any nephrotoxic agents (can harm kidneys) - Intravenous contrast for diagnostic testing, combination cold medications.  Have all medications adjusted for your renal function by your Health Care Provider.  Blood pressure control is important.  Take all medication as prescribed.      Diagnosis: Hyperlipidemia  Assessment and Plan of Treatment: Hyperlipidemia  Continue with your cholesterol medications. Eat a heart healthy diet that is low in saturated fats and salt, and includes whole grains, fruits, vegetables and lean protein; exercise regularly (consult with your physician or cardiologist first); maintain a heart healthy weight; if you smoke - quit (A resource to help you stop smoking is the [facility] – phone number [phone].). Continue to follow with your primary physician or cardiologist.  Seek medical help for dizziness, Lightheadedness, Blurry vision, Headache, Chest pain, Shortness of breath      Diagnosis: Hypertension  Assessment and Plan of Treatment: Will continue with atenolol, amlodipine   - Continue to hold ACEI in the setting of hyponatremia.   Hypertension  Low salt diet  Activity as tolerated.  Take all medication as prescribed.  Follow up with your medical doctor for routine blood pressure monitoring at your next visit.  Notify your doctor if you have any of the following symptoms:   Dizziness, Lightheadedness, Blurry vision, Headache, Chest pain, Shortness of breath

## 2019-08-16 NOTE — PROGRESS NOTE ADULT - PROBLEM SELECTOR PLAN 7
- History of HTN. Takes atenolol 25 mg PO BID, amlodipine 5 mg PO QD and lisinopril 10 mg PO QD at home  - Will continue with atenolol, amlodipine   - Will hold off ACEI in the setting of hyponatremia.
- History of HTN. Takes atenolol 25 mg PO BID, amlodipine 5 mg PO QD and lisinopril 10 mg PO QD at home  - Will continue with atenolol, amlodipine   - Continue to hold ACEI in the setting of hyponatremia.
- replete and recheck in AM
-c/w atenolol  -hold eliquis for now, pt is s/p intracranial bleed.

## 2019-08-23 ENCOUNTER — TRANSCRIPTION ENCOUNTER (OUTPATIENT)
Age: 84
End: 2019-08-23

## 2019-08-23 PROBLEM — I48.91 UNSPECIFIED ATRIAL FIBRILLATION: Chronic | Status: ACTIVE | Noted: 2019-08-13

## 2019-08-23 PROBLEM — E03.9 HYPOTHYROIDISM, UNSPECIFIED: Chronic | Status: ACTIVE | Noted: 2019-08-13

## 2019-08-28 ENCOUNTER — APPOINTMENT (OUTPATIENT)
Dept: UROLOGY | Facility: CLINIC | Age: 84
End: 2019-08-28
Payer: MEDICARE

## 2019-08-28 VITALS
HEART RATE: 85 BPM | SYSTOLIC BLOOD PRESSURE: 120 MMHG | WEIGHT: 117 LBS | BODY MASS INDEX: 22.09 KG/M2 | DIASTOLIC BLOOD PRESSURE: 80 MMHG | HEIGHT: 61 IN | RESPIRATION RATE: 13 BRPM | OXYGEN SATURATION: 97 %

## 2019-08-28 DIAGNOSIS — Z72.89 OTHER PROBLEMS RELATED TO LIFESTYLE: ICD-10-CM

## 2019-08-28 DIAGNOSIS — Z86.69 PERSONAL HISTORY OF OTHER DISEASES OF THE NERVOUS SYSTEM AND SENSE ORGANS: ICD-10-CM

## 2019-08-28 DIAGNOSIS — Z86.79 PERSONAL HISTORY OF OTHER DISEASES OF THE CIRCULATORY SYSTEM: ICD-10-CM

## 2019-08-28 DIAGNOSIS — Z63.4 DISAPPEARANCE AND DEATH OF FAMILY MEMBER: ICD-10-CM

## 2019-08-28 DIAGNOSIS — N28.89 OTHER SPECIFIED DISORDERS OF KIDNEY AND URETER: ICD-10-CM

## 2019-08-28 DIAGNOSIS — Z80.7 FAMILY HISTORY OF OTHER MALIGNANT NEOPLASMS OF LYMPHOID, HEMATOPOIETIC AND RELATED TISSUES: ICD-10-CM

## 2019-08-28 DIAGNOSIS — Z85.028 PERSONAL HISTORY OF OTHER MALIGNANT NEOPLASM OF STOMACH: ICD-10-CM

## 2019-08-28 DIAGNOSIS — Z80.0 FAMILY HISTORY OF MALIGNANT NEOPLASM OF DIGESTIVE ORGANS: ICD-10-CM

## 2019-08-28 DIAGNOSIS — Z80.8 FAMILY HISTORY OF MALIGNANT NEOPLASM OF OTHER ORGANS OR SYSTEMS: ICD-10-CM

## 2019-08-28 DIAGNOSIS — Z86.39 PERSONAL HISTORY OF OTHER ENDOCRINE, NUTRITIONAL AND METABOLIC DISEASE: ICD-10-CM

## 2019-08-28 DIAGNOSIS — R33.9 RETENTION OF URINE, UNSPECIFIED: ICD-10-CM

## 2019-08-28 PROCEDURE — 99203 OFFICE O/P NEW LOW 30 MIN: CPT

## 2019-08-28 RX ORDER — ATORVASTATIN CALCIUM 10 MG/1
10 TABLET, FILM COATED ORAL
Refills: 0 | Status: ACTIVE | COMMUNITY

## 2019-08-28 RX ORDER — AMLODIPINE BESYLATE 5 MG/1
TABLET ORAL
Refills: 0 | Status: ACTIVE | COMMUNITY

## 2019-08-28 RX ORDER — VITS A,C,E/LUTEIN/MINERALS 300MCG-200
TABLET ORAL
Refills: 0 | Status: ACTIVE | COMMUNITY

## 2019-08-28 RX ORDER — ATENOLOL 100 MG/1
100 TABLET ORAL
Refills: 0 | Status: ACTIVE | COMMUNITY

## 2019-08-28 RX ORDER — NORMAL SALT TABLETS 1 G/G
TABLET ORAL
Refills: 0 | Status: ACTIVE | COMMUNITY

## 2019-08-28 RX ORDER — LEVOTHYROXINE SODIUM 137 UG/1
TABLET ORAL
Refills: 0 | Status: ACTIVE | COMMUNITY

## 2019-08-28 SDOH — SOCIAL STABILITY - SOCIAL INSECURITY: DISSAPEARANCE AND DEATH OF FAMILY MEMBER: Z63.4

## 2019-08-28 NOTE — ASSESSMENT
[FreeTextEntry1] : Residual urine today was 140 cc. She could use over-the-counter Azo bladder control for urinary frequency and urgency. She is otherwise not very bothered by urinary symptoms. Recent ultrasound could not detect any renal masses. She could followup with imaging on annual basis.\par \par Wilbert Whaley MD, FACS\par [facility]\par  of Urology\par \par [address], [address]\[place] [address]\par \par [address]\[place] [address]\par \par Tel: [phone]\par Fax: [phone]

## 2019-08-28 NOTE — REVIEW OF SYSTEMS
[Feeling Tired] : feeling tired [Eyesight Problems] : eyesight problems [Palpitations] : palpitations [Shortness Of Breath] : shortness of breath [Cough] : cough [Urine retention] : urine retention [Wake up at night to urinate  How many times?  ___] : wakes up to urinate [unfilled] times during the night [Bladder fullness after urinating] : bladder fullness after urinating [Joint Swelling] : joint swelling [Dizziness] : dizziness [Limb Weakness] : limb weakness [Difficulty Walking] : difficulty walking [Hot Flashes] : hot flashes [Muscle Weakness] : muscle weakness [Easy Bleeding] : a tendency for easy bleeding [Easy Bruising] : a tendency for easy bruising [Negative] : Psychiatric [FreeTextEntry2] : High blood pressure

## 2019-08-28 NOTE — PHYSICAL EXAM
[General Appearance - Well Developed] : well developed [General Appearance - Well Nourished] : well nourished [Normal Appearance] : normal appearance [Well Groomed] : well groomed [General Appearance - In No Acute Distress] : no acute distress [] : no respiratory distress [Respiration, Rhythm And Depth] : normal respiratory rhythm and effort [Exaggerated Use Of Accessory Muscles For Inspiration] : no accessory muscle use [Abdomen Soft] : soft [Abdomen Tenderness] : non-tender [Costovertebral Angle Tenderness] : no ~M costovertebral angle tenderness [FreeTextEntry1] : using a walker [Affect] : the affect was normal [Mood] : the mood was normal [Not Anxious] : not anxious

## 2019-08-28 NOTE — LETTER BODY
[Dear  ___] : Dear  [unfilled], [Consult Letter:] : I had the pleasure of evaluating your patient, [unfilled]. [FreeTextEntry1] : [facility]\par [address] \[place], [place], [address]  \par [phone]  [Consult Closing:] : Thank you very much for allowing me to participate in the care of this patient.  If you have any questions, please do not hesitate to contact me.

## 2019-08-28 NOTE — HISTORY OF PRESENT ILLNESS
[FreeTextEntry1] : Fransisca Garg is a 89 year old woman who was seen for evaluation of small renal mass and urinary symptoms. She was last seen in 2016. She was hospitalized in August 2019 for head trauma. She was told that residual urine was about 400 cc and she should followup with urology. She has urinary frequency and urgency but she is not bothered by it significantly. Creatinine level was 0.8 and urinalysis was negative in August 2019. Also ultrasound showed small renal cysts but no renal masses. Residual urine today however was 140 cc.\par \par Previous notes: US on 7/2016 could not detect the mass. US in 8/2015, did not show the small renal mass. CT on 7/2014, done for [facility] follow-up showed a right renal 1.4 cm and possibly 7 mm enhancing masses. CT on 1/24/2015, [facility] showed solid, enhancing nodule in mid cortex of right kidney, 1 cm. A previously seen small indeterminate lesion was too small to characterize but appeared more cystic.

## 2019-11-11 ENCOUNTER — APPOINTMENT (OUTPATIENT)
Dept: NEPHROLOGY | Facility: CLINIC | Age: 84
End: 2019-11-11
Payer: MEDICARE

## 2019-11-11 VITALS
SYSTOLIC BLOOD PRESSURE: 140 MMHG | HEIGHT: 61 IN | BODY MASS INDEX: 21.85 KG/M2 | HEART RATE: 110 BPM | OXYGEN SATURATION: 95 % | DIASTOLIC BLOOD PRESSURE: 97 MMHG | WEIGHT: 115.74 LBS

## 2019-11-11 DIAGNOSIS — I10 ESSENTIAL (PRIMARY) HYPERTENSION: ICD-10-CM
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
## 2019-11-11 DIAGNOSIS — E87.1 HYPO-OSMOLALITY AND HYPONATREMIA: ICD-10-CM

## 2019-11-11 PROCEDURE — 99215 OFFICE O/P EST HI 40 MIN: CPT

## 2019-11-11 NOTE — REASON FOR VISIT
[Consultation] : a consultation visit [Family Member] : family member [FreeTextEntry1] : Prior h/o MELVIN and hyponatremia during August 2019 admission at [facility]

## 2019-11-11 NOTE — ASSESSMENT
[FreeTextEntry1] : hyponatremia: Had imprved with fluids at admission. Had last labs in September, not available today.\par Will recheck labs\par On sodium choride supplements, she prefers it to adding NaCl in foods\par MELVIN: Improved while admitted 1.7 to 1.0.\par Will recheck\par HTN: Fairly controlled, oFF acei. Continue current meds\par Renal Lesions: Noted CT from 2015 stable lesion, noted sonogram 8/2019- no lesions\par AFib: Being followed by primary MD\par Discussed fall prevention, fluid intake\par Follow up in 3 months\par

## 2019-11-11 NOTE — PHYSICAL EXAM
[General Appearance - In No Acute Distress] : in no acute distress [General Appearance - Alert] : alert [Sclera] : the sclera and conjunctiva were normal [PERRL With Normal Accommodation] : pupils were equal in size, round, and reactive to light [Extraocular Movements] : extraocular movements were intact [Outer Ear] : the ears and nose were normal in appearance [Oropharynx] : the oropharynx was normal [Neck Cervical Mass (___cm)] : no neck mass was observed [Neck Appearance] : the appearance of the neck was normal [Thyroid Diffuse Enlargement] : the thyroid was not enlarged [Thyroid Nodule] : there were no palpable thyroid nodules [Jugular Venous Distention Increased] : there was no jugular-venous distention [Auscultation Breath Sounds / Voice Sounds] : lungs were clear to auscultation bilaterally [Heart Sounds Gallop] : no gallops [Bowel Sounds] : normal bowel sounds [Heart Sounds Pericardial Friction Rub] : no pericardial rub [Abdomen Soft] : soft [Abdomen Tenderness] : non-tender [] : no hepato-splenomegaly [Cervical Lymph Nodes Enlarged Posterior Bilaterally] : posterior cervical [Abdomen Mass (___ Cm)] : no abdominal mass palpated [Cervical Lymph Nodes Enlarged Anterior Bilaterally] : anterior cervical [Axillary Lymph Nodes Enlarged Bilaterally] : axillary [Supraclavicular Lymph Nodes Enlarged Bilaterally] : supraclavicular [Involuntary Movements] : no involuntary movements were seen [Impaired Insight] : insight and judgment were intact [Oriented To Time, Place, And Person] : oriented to person, place, and time [Affect] : the affect was normal [FreeTextEntry1] : no acute focal signs

## 2019-11-11 NOTE — HISTORY OF PRESENT ILLNESS
[FreeTextEntry1] : Patient was discharged from hospital after head trauma, hyponatremia, MELVIN that improved in August 2019.\par Details as noted below.\par \par \par \par (((Hospital Course:\par Discharge Date 16-Aug-2019\par Admission Date 13-Aug-2019 06:27\par Reason for Admission Head trauma s/p fall\par Medication Reconciliation Status Admission Reconciliation is Completed\par Discharge Reconciliation is Completed\par Hospital Course \par 89 y/o F w/ PMH of GI stromal tumor s/p partial gastrectomy, IVC filter(unclear\par reason but placed during periop period for gastrectomy per daughter), atrial\par fibrillation on Eliquis, CKD Stage 3, HTN, HLD, and hypothyroidism, transferred\par from [place] for inferior frontal and right temporal hemorrhagic\par contusion with cerebral edema and brain compression, scattered SAH/IVH s/p\par fall. Patient noted with MELVIN on CKD, hyponatremia, orthostatic hypotension.\par \par  Problem/Plan - 1:\par - Problem: Traumatic hemorrhage of right cerebrum without loss of\par consciousness, initial encounter.\par - S/p mechanical fall, occipital laceration repaired OSH.\par - + orthostatic hypotension, though, no symptoms reported. s/p IVF.\par - continue with Keppra 500 mg PO BID x 7 days for seizure prophylaxis\par - No surgical intervention at this time per neurosurgery. 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\par - Patient noted to have chronically low serum Na since Aug 2018\par - Nephrology followed along and patient was treated with1.5% Nacl IVF with\par improvement in serum Na, transitioned to salt tab 1g BID\par - Per discussion with renal, ok with serum sodium of 130 prior to discharge.\par \par  Problem/Plan - 3:\par - Problem: Acute kidney injury superimposed on chronic kidney disease.\par - SCr 1.74 on admission\par - CKD stage 3 at baseline on outpatient record.\par - S/p 3L NS in ED with improvement in renal function\par \par  Problem/Plan - 4:\par - Problem: Urinary retention.\par - patient had episode of urinary retention requiring straigh cath but she was\par able to urinate on her own thereafter.\par - no hydro on renal ultrasound\par \par  Problem/Plan - 5:\par - Problem: Adnexal cyst.\par - 7.2cm right adnexal cyst noted on ultrasound but it was also present on prior\par CT in 2015\par - outpatient follow up recommended per discussion with daughter.\par \par  Problem/Plan - 6:\par Problem: Atrial fibrillation.\par - History of a-fib. Patient is on Eliquis 2.5 mg PO BID and atenolol 25 mg PO\par BID at home\par - Holding Eliquis in light of intracranial bleeding. Risk of intracranial\par bleeding outweighs benefit at this time.\par \par  Problem/Plan - 7:\par - Problem: HTN (hypertension).\par - Continue with atenolol, amlodipine\par - Hold ACEI in the setting of hyponatremia.\par \par \par PT eval--> ELISA recommended but family opted to take patient home.\par \par \par \par Care Plan/Procedures:\par Goal(s) To get better and follow your care plan as instructed.\par Discharge Diagnoses, Assessment and Plan of Treatment PRINCIPAL DISCHARGE\par DIAGNOSIS\par Diagnosis: Traumatic hemorrhage of right cerebrum without loss of\par consciousness, initial encounter\par Assessment and Plan of Treatment: S/p mechanical fall, occipital laceration\par repaired OSH.\par - + orthostatic hypotension, though, no symptoms reported. s/p IVF. will\par continue to monitor for now\par - CT head revealing inferior frontal and anterior right temporal lobe\par hemorrhagic contusions with edema and mass effect and scattered SAH\par - S/p 1g Keppra load. Will continue with Keppra 500 mg PO BID x 7 days\par - Neurosurg consulted. No surgical intervention at this time. Will hold Eliquis\par for 2 weeks.\par - Pain management with Tylenol. Avoid NSAIDs in the setting of bleeding\par - repeat CT head with stable changes and CT c-spine with no acute fracture.\par \par \par SECONDARY DISCHARGE DIAGNOSES\par Diagnosis: Hyponatremia\par Assessment and Plan of Treatment: Hyponatremia uspect due to SIADH\par - Patient is noted to have chronically low serum Na since Aug 2018, however WNL\par previously\par - Nephrology following and s/p course of 1.5% Nacl IVF with improvement in\par serum Na, now on salt tab 1g BID\par - Per discussion with renal fellow(Dr. Marx), ok with serum sodium of 130 prior\par to discharge.\par \par Diagnosis: Adnexal cyst\par Assessment and Plan of Treatment: Adnexal cyst\par Plan: - 7.2cm right adnexal cyst noted on ultrasound but it was also present on\par prior CT in 2015\par - outpatient follow up recommended per discussion with daughter.\par \par Diagnosis: Afib\par Assessment and Plan of Treatment: Afib\par Holding Eliquis in light of intracranial bleeding. Risk of intracranial\par bleeding outweighs benefit at this time.\par Atrial fibrillation is the most common heart rhythm problem & has the risk of\par stroke & heart attack\par It helps if you control your blood pressure, not drink more than 1-2 alcohol\par drinks per day, cut down on caffeine, getting treatment for over active thyroid\par gland, & getting exercise\par Call your doctor if you feel your heart racing or beating unusually, chest\par tightness or pain, lightheaded, faint, shortness of breath especially with\par exercise\par It is important to take your heart medication as prescribed\par You may be on anticoagulation which is very important to take as directed - you\par may need blood work to monitor drug levels\par \par \par Diagnosis: Acute on chronic kidney failure\par Assessment and Plan of Treatment: Acute on chronic kidney failure\par CKD stage 3 at baseline on outpatient record.\par - S/p 3L NS in ED with improvement in renal function\par Avoid taking (NSAIDs) - (ex: Ibuprofen, Advil, Celebrex, Naprosyn)\par Avoid taking any nephrotoxic agents (can harm kidneys) - Intravenous contrast\par for diagnostic testing, combination cold medications.\par Have all medications adjusted for your renal function by your Health Care\par Provider.\par Blood pressure control is important. Take all medication as prescribed.\par \par \par Diagnosis: Hyperlipidemia\par Assessment and Plan of Treatment: Hyperlipidemia\par Continue with your cholesterol medications. Eat a heart healthy diet that is\par low in saturated fats and salt, and includes whole grains, fruits, vegetables\par and lean protein; exercise regularly (consult with your physician or\par cardiologist first); maintain a heart healthy weight; if you smoke - quit (A\par resource to help you stop smoking is the [facility]\par Control ? phone number [phone].). Continue to follow with your primary\par physician or cardiologist.\par Seek medical help for dizziness, Lightheadedness, Blurry vision, Headache,\par Chest pain, Shortness of breath\par Diagnosis: Hypertension\par Assessment and Plan of Treatment: Will continue with atenolol, amlodipine\par - Continue to hold ACEI in the setting of hyponatremia.\par Hypertension))))\par ..............................\par \par \par Currently she feels ok. Daughter Jacquelin (From [facility])and son Larry ([place]) accompanying her today. Daughter Sera and son Larry  living with her since August 2019.\par Has 3 children, 5 grandchildren and 4 great grands. Grand daughter is a  in [place].\par She is a retired  from [employer].  and she were Pry[place], born in [place].\par Past:\par A Fib, was on eliquis, stopped after fall\par Used to be on aspirin, not any more\par H/o HTN, on meds off ace since fall.\par She is doing better since discharge\par Walks and independent for ADL with some assistance.\par Hypothyroidism on meds.\par Had renal lesions in the past on imaging, last sono no lesions seen in August 2019.\par \par Has bilateral LE edema, left always more than right.\par Non smoker\par Had stomach lesion resected (2012), at [facility] had IVC filter, post operatively.\par Has had cataract surgery bilaterally.\par Meds reviewed. NKDA\par \par PCP: Felix Turner\par Cardiologist: Retired- none at present.\par

## 2019-12-15 ENCOUNTER — INPATIENT (INPATIENT)
Facility: HOSPITAL | Age: 84
LOS: 5 days | Discharge: HOME HEALTH | End: 2019-12-21
Attending: INTERNAL MEDICINE | Admitting: INTERNAL MEDICINE
Payer: COMMERCIAL

## 2019-12-15 VITALS
HEIGHT: 62 IN | HEART RATE: 116 BPM | DIASTOLIC BLOOD PRESSURE: 93 MMHG | RESPIRATION RATE: 20 BRPM | SYSTOLIC BLOOD PRESSURE: 150 MMHG | WEIGHT: 117.07 LBS | TEMPERATURE: 98 F | OXYGEN SATURATION: 90 %

## 2019-12-15 DIAGNOSIS — E03.9 HYPOTHYROIDISM, UNSPECIFIED: ICD-10-CM

## 2019-12-15 DIAGNOSIS — E78.5 HYPERLIPIDEMIA, UNSPECIFIED: ICD-10-CM

## 2019-12-15 DIAGNOSIS — E87.1 HYPO-OSMOLALITY AND HYPONATREMIA: ICD-10-CM

## 2019-12-15 DIAGNOSIS — J90 PLEURAL EFFUSION, NOT ELSEWHERE CLASSIFIED: ICD-10-CM

## 2019-12-15 DIAGNOSIS — J81.1 CHRONIC PULMONARY EDEMA: ICD-10-CM

## 2019-12-15 DIAGNOSIS — I48.91 UNSPECIFIED ATRIAL FIBRILLATION: ICD-10-CM

## 2019-12-15 LAB
ALBUMIN SERPL ELPH-MCNC: 3.6 G/DL — SIGNIFICANT CHANGE UP (ref 3.3–5)
ALP SERPL-CCNC: 177 U/L — HIGH (ref 40–120)
ALT FLD-CCNC: 55 U/L — SIGNIFICANT CHANGE UP (ref 12–78)
ANION GAP SERPL CALC-SCNC: 9 MMOL/L — SIGNIFICANT CHANGE UP (ref 5–17)
APPEARANCE UR: CLEAR — SIGNIFICANT CHANGE UP
APTT BLD: 32.6 SEC — SIGNIFICANT CHANGE UP (ref 27.5–36.3)
AST SERPL-CCNC: 36 U/L — SIGNIFICANT CHANGE UP (ref 15–37)
BACTERIA # UR AUTO: ABNORMAL
BASE EXCESS BLDA CALC-SCNC: -3 MMOL/L — LOW (ref -2–2)
BASOPHILS # BLD AUTO: 0.05 K/UL — SIGNIFICANT CHANGE UP (ref 0–0.2)
BASOPHILS NFR BLD AUTO: 0.5 % — SIGNIFICANT CHANGE UP (ref 0–2)
BILIRUB SERPL-MCNC: 0.5 MG/DL — SIGNIFICANT CHANGE UP (ref 0.2–1.2)
BILIRUB UR-MCNC: NEGATIVE — SIGNIFICANT CHANGE UP
BLOOD GAS COMMENTS: SIGNIFICANT CHANGE UP
BLOOD GAS SOURCE: SIGNIFICANT CHANGE UP
BUN SERPL-MCNC: 24 MG/DL — HIGH (ref 7–23)
CALCIUM SERPL-MCNC: 9.1 MG/DL — SIGNIFICANT CHANGE UP (ref 8.5–10.1)
CHLORIDE SERPL-SCNC: 93 MMOL/L — LOW (ref 96–108)
CO2 SERPL-SCNC: 22 MMOL/L — SIGNIFICANT CHANGE UP (ref 22–31)
COLOR SPEC: YELLOW — SIGNIFICANT CHANGE UP
CREAT SERPL-MCNC: 1.02 MG/DL — SIGNIFICANT CHANGE UP (ref 0.5–1.3)
D DIMER BLD IA.RAPID-MCNC: 453 NG/ML DDU — HIGH
DIFF PNL FLD: NEGATIVE — SIGNIFICANT CHANGE UP
EOSINOPHIL # BLD AUTO: 0.11 K/UL — SIGNIFICANT CHANGE UP (ref 0–0.5)
EOSINOPHIL NFR BLD AUTO: 1.2 % — SIGNIFICANT CHANGE UP (ref 0–6)
EPI CELLS # UR: SIGNIFICANT CHANGE UP
GLUCOSE SERPL-MCNC: 117 MG/DL — HIGH (ref 70–99)
GLUCOSE UR QL: NEGATIVE MG/DL — SIGNIFICANT CHANGE UP
HCO3 BLDA-SCNC: 21 MMOL/L — SIGNIFICANT CHANGE UP (ref 21–29)
HCT VFR BLD CALC: 39.5 % — SIGNIFICANT CHANGE UP (ref 34.5–45)
HGB BLD-MCNC: 13.2 G/DL — SIGNIFICANT CHANGE UP (ref 11.5–15.5)
HOROWITZ INDEX BLDA+IHG-RTO: 28 — SIGNIFICANT CHANGE UP
IMM GRANULOCYTES NFR BLD AUTO: 0.5 % — SIGNIFICANT CHANGE UP (ref 0–1.5)
INR BLD: 0.99 RATIO — SIGNIFICANT CHANGE UP (ref 0.88–1.16)
KETONES UR-MCNC: NEGATIVE — SIGNIFICANT CHANGE UP
LEUKOCYTE ESTERASE UR-ACNC: NEGATIVE — SIGNIFICANT CHANGE UP
LYMPHOCYTES # BLD AUTO: 1.29 K/UL — SIGNIFICANT CHANGE UP (ref 1–3.3)
LYMPHOCYTES # BLD AUTO: 13.5 % — SIGNIFICANT CHANGE UP (ref 13–44)
MAGNESIUM SERPL-MCNC: 1.9 MG/DL — SIGNIFICANT CHANGE UP (ref 1.6–2.6)
MCHC RBC-ENTMCNC: 28.1 PG — SIGNIFICANT CHANGE UP (ref 27–34)
MCHC RBC-ENTMCNC: 33.4 GM/DL — SIGNIFICANT CHANGE UP (ref 32–36)
MCV RBC AUTO: 84 FL — SIGNIFICANT CHANGE UP (ref 80–100)
MONOCYTES # BLD AUTO: 1.24 K/UL — HIGH (ref 0–0.9)
MONOCYTES NFR BLD AUTO: 13 % — SIGNIFICANT CHANGE UP (ref 2–14)
NEUTROPHILS # BLD AUTO: 6.8 K/UL — SIGNIFICANT CHANGE UP (ref 1.8–7.4)
NEUTROPHILS NFR BLD AUTO: 71.3 % — SIGNIFICANT CHANGE UP (ref 43–77)
NITRITE UR-MCNC: NEGATIVE — SIGNIFICANT CHANGE UP
NRBC # BLD: 0 /100 WBCS — SIGNIFICANT CHANGE UP (ref 0–0)
NT-PROBNP SERPL-SCNC: 6199 PG/ML — HIGH (ref 0–450)
PCO2 BLDA: 34 MMHG — SIGNIFICANT CHANGE UP (ref 32–46)
PH BLD: 7.4 — SIGNIFICANT CHANGE UP (ref 7.35–7.45)
PH UR: 5 — SIGNIFICANT CHANGE UP (ref 5–8)
PLATELET # BLD AUTO: 367 K/UL — SIGNIFICANT CHANGE UP (ref 150–400)
PO2 BLDA: 72 MMHG — LOW (ref 74–108)
POTASSIUM SERPL-MCNC: 4.3 MMOL/L — SIGNIFICANT CHANGE UP (ref 3.5–5.3)
POTASSIUM SERPL-SCNC: 4.3 MMOL/L — SIGNIFICANT CHANGE UP (ref 3.5–5.3)
PROT SERPL-MCNC: 8.2 GM/DL — SIGNIFICANT CHANGE UP (ref 6–8.3)
PROT UR-MCNC: 15 MG/DL
PROTHROM AB SERPL-ACNC: 11.1 SEC — SIGNIFICANT CHANGE UP (ref 10–12.9)
RBC # BLD: 4.7 M/UL — SIGNIFICANT CHANGE UP (ref 3.8–5.2)
RBC # FLD: 14.9 % — HIGH (ref 10.3–14.5)
SAO2 % BLDA: 94 % — SIGNIFICANT CHANGE UP (ref 92–96)
SODIUM SERPL-SCNC: 124 MMOL/L — LOW (ref 135–145)
SP GR SPEC: 1.01 — SIGNIFICANT CHANGE UP (ref 1.01–1.02)
TROPONIN I SERPL-MCNC: <.015 NG/ML — SIGNIFICANT CHANGE UP (ref 0.01–0.04)
TSH SERPL-MCNC: 2.58 UIU/ML — SIGNIFICANT CHANGE UP (ref 0.36–3.74)
UROBILINOGEN FLD QL: NEGATIVE MG/DL — SIGNIFICANT CHANGE UP
WBC # BLD: 9.54 K/UL — SIGNIFICANT CHANGE UP (ref 3.8–10.5)
WBC # FLD AUTO: 9.54 K/UL — SIGNIFICANT CHANGE UP (ref 3.8–10.5)

## 2019-12-15 PROCEDURE — 71275 CT ANGIOGRAPHY CHEST: CPT | Mod: 26

## 2019-12-15 PROCEDURE — 99285 EMERGENCY DEPT VISIT HI MDM: CPT

## 2019-12-15 PROCEDURE — 70450 CT HEAD/BRAIN W/O DYE: CPT | Mod: 26

## 2019-12-15 PROCEDURE — 93970 EXTREMITY STUDY: CPT | Mod: 26

## 2019-12-15 PROCEDURE — 71045 X-RAY EXAM CHEST 1 VIEW: CPT | Mod: 26

## 2019-12-15 PROCEDURE — 93010 ELECTROCARDIOGRAM REPORT: CPT

## 2019-12-15 RX ORDER — ESLICARBAZEPINE ACETATE 800 MG/1
400 TABLET ORAL DAILY
Refills: 0 | Status: DISCONTINUED | OUTPATIENT
Start: 2019-12-15 | End: 2019-12-15

## 2019-12-15 RX ORDER — FUROSEMIDE 40 MG
40 TABLET ORAL ONCE
Refills: 0 | Status: COMPLETED | OUTPATIENT
Start: 2019-12-15 | End: 2019-12-15

## 2019-12-15 RX ORDER — SODIUM CHLORIDE 9 MG/ML
1 INJECTION INTRAMUSCULAR; INTRAVENOUS; SUBCUTANEOUS
Refills: 0 | Status: DISCONTINUED | OUTPATIENT
Start: 2019-12-15 | End: 2019-12-16

## 2019-12-15 RX ORDER — ATENOLOL 25 MG/1
25 TABLET ORAL EVERY 12 HOURS
Refills: 0 | Status: DISCONTINUED | OUTPATIENT
Start: 2019-12-15 | End: 2019-12-21

## 2019-12-15 RX ORDER — FUROSEMIDE 40 MG
40 TABLET ORAL
Refills: 0 | Status: DISCONTINUED | OUTPATIENT
Start: 2019-12-15 | End: 2019-12-21

## 2019-12-15 RX ORDER — AMLODIPINE BESYLATE 2.5 MG/1
5 TABLET ORAL DAILY
Refills: 0 | Status: DISCONTINUED | OUTPATIENT
Start: 2019-12-15 | End: 2019-12-21

## 2019-12-15 RX ADMIN — ATENOLOL 25 MILLIGRAM(S): 25 TABLET ORAL at 18:01

## 2019-12-15 RX ADMIN — SODIUM CHLORIDE 1 GRAM(S): 9 INJECTION INTRAMUSCULAR; INTRAVENOUS; SUBCUTANEOUS at 18:01

## 2019-12-15 RX ADMIN — Medication 40 MILLIGRAM(S): at 16:42

## 2019-12-15 RX ADMIN — Medication 40 MILLIGRAM(S): at 18:00

## 2019-12-15 NOTE — ED PROVIDER NOTE - CLINICAL SUMMARY MEDICAL DECISION MAKING FREE TEXT BOX
pt with bl effusion and hyponatremia. only mildly symptomatic. kathy avendano. d/w renal and dr clayton. familiy at bedside, states pt is DNR.

## 2019-12-15 NOTE — H&P ADULT - PROBLEM SELECTOR PLAN 3
fluid  restrict  continue  Na tabs  check urine osmolality  Na  K  Cl   D/C Keppra as  possible  component to hyponatremia  start Aptiom fluid  restrict  continue  Na tabs  check urine osmolality  Na  K  Cl

## 2019-12-15 NOTE — ED PROVIDER NOTE - OBJECTIVE STATEMENT
89 yo female from home with pmh GI stromal tumor s/p partial gastrectomy with ?ppx IVC filter, afib not on AC, CKD Stage 3, HTN, HLD, hypothyroid, chronic hyponatremia on Na 1g BID, h/o SAH/IVH 8/19 presents with sob at night for past few days but worse today. + b/l LE edema x 1 week.  family also noted pt a little more confused in past 4 days. Pt had routine PMD visit and noted hyponatremia and was awaiting fu tomorrow. pt is AOX3, c/o tachypnea, but denies headache, dizziness, cp, abd pain. Pt c/o b/l calf pain. pt normally ambulates with cane up until last week walking with walker.    ROS: No fever/chills. No photophobia/eye pain/changes in vision, No ear pain/sore throat/dysphagia, No chest pain/palpitations. +SOB, no cough. No abdominal pain, No N/V/D, no black/bloody bm. No dysuria/frequency/discharge, No headache. No Dizziness.  No rash.  No numbness/tingling/weakness.

## 2019-12-15 NOTE — ED ADULT NURSE NOTE - OBJECTIVE STATEMENT
Pt is a 89YOF who is here for difficulty breathing, pts daughter states that she had shortness of breath with worsening dyspnea on exertion over the last few days, pt called Dr. Boyce and was going to see him tomorrow, but the patient worsened today and they are here for evaluation instead, pt has bilateral rhonchi.

## 2019-12-15 NOTE — PATIENT PROFILE ADULT - FUNCTIONAL SCREEN CURRENT LEVEL: SWALLOWING (IF SCORE 2 OR MORE FOR ANY ITEM, CONSULT REHAB SERVICES), MLM)
EMERGENCY ROOM : Chart reviewed for psych transfer. Patient evaluated by Telepsych and recommended for involuntary transfer to [facility]. Admitting dx: Schizophrenia. LMSW contacted and spoke to [facility] RN , Micheline Stevenson who reports patient is assigned to LOW 4. Accepting DEMARCUS is Dr. Black. LMSW met with patient to confirm demographic information as patient is listed as undomiciled. Patient awake and cooperative. Patient states he resides alone in an apartment at [address], [address], [place] [address]. Patient declines to provide an emergency contact. Patient states he recently moved to [place] from [place] in May 2018 and is estranged from his family. Patient reports he is unemployed and a non-caregiver. Patient provided insurance information as [employer] (policy #[id number]). Patient consented to LMSW making a copy of insurance card for transfer packet. Medical clearance confirmed with treating MD.  BETZY Kidd contacted [facility] and provided RN handoff. [facility] contacted to arrange transport to [facility] LOW 4. Transfer packet completed to include Involuntary Legals and copy of EKG and insurance card. [facility] Telepsych email sent with all transfer details.    LMSW contacted [employer] at [phone]. Office closed until next business day. Clinicals faxed to [phone] for review. Sign out provided to ED incoming LMSW for followup in the AM. Social Work remains available for further transfer needs. 2 = difficulty swallowing liquids/foods

## 2019-12-15 NOTE — CONSULT NOTE ADULT - SUBJECTIVE AND OBJECTIVE BOX
Nephrology Consultation: MD ALISTAIR Tang, YOLANDA  89y    HPI: 89 white female from home with pmh GI stromal tumor s/p partial gastrectomy with ?ppx IVC filter, afib not on AC, CKD Stage 3, HTN, HLD,hypothyroid, chronic hyponatremia on Na 1g BID, h/o SAH/IVH 8/19 presents with sob at night for past few days but worse today. + b/l LE edema x 1 week.  family also noted pt a little more confused in past 4 days. Pt had routine PMD visit and noted hyponatremia and was awaiting fu tomorrow. pt is AOX3, c/o tachypnea, but denies headache, dizziness, cp, abd pain. Pt c/o b/l calf pain. pt normally ambulates with cane up until last week walking with walker.No chest pain/palpitations. +SOB, no cough. No abdominal pain, No N/V/D, no black/bloody bm. No dysuria/frequency/discharge. Denies any use of NSAIDS. Has not been on diuretics at this time.           PAST MEDICAL & SURGICAL HISTORY:  Atrial fibrillation  Hypothyroidism  HTN (hypertension)  HLD (hyperlipidemia)  No significant past surgical history      Allergies    No Known Allergies    Intolerances        Home Medications:  amLODIPine 5 mg oral tablet: 1 tab(s) orally once a day (13 Aug 2019 12:31)  atenolol 25 mg oral tablet: 1 tab(s) orally every 12 hours (13 Aug 2019 12:31)  atorvastatin 10 mg oral tablet: 1 tab(s) orally once a day (13 Aug 2019 12:31)  levothyroxine 50 mcg (0.05 mg) oral tablet: 1 tab(s) orally once a day (13 Aug 2019 12:31)        FAMILY HISTORY:  No pertinent family history in first degree relatives      SOCIAL HISTORY:    REVIEW OF SYSTEMS:    Constitutional: No fever, weight loss or fatigue  Eyes: No eye pain, visual disturbances, or discharge  ENT:  No difficulty hearing, tinnitus, vertigo; No sinus or throat pain  Neck: No pain or stiffness  Breasts: No pain, masses or nipple discharge  Respiratory: No cough, wheezing, chills or hemoptysis  Cardiovascular: No chest pain, palpitations, shortness of breath, dizziness or leg swelling  Gastrointestinal: No abdominal or epigastric pain. No nausea, vomiting or hematemesis; No diarrhea or constipation. No melena or hematochezia.  Genitourinary: No dysuria, frequency, hematuria or incontinence  Rectal: No pain, hemorrhoids or incontinence  Neurological: No headaches, memory loss, loss of strength, numbness or tremors  Skin: No itching, burning, rashes or lesions   Lymph Nodes: No enlarged glands  Endocrine: No heat or cold intolerance; No hair loss  Musculoskeletal: No joint pain or swelling; No muscle, back or extremity pain  Psychiatric: No depression, anxiety, mood swings or difficulty sleeping  Heme/Lymph: No easy bruising or bleeding gums  Allergy and Immunologic: No hives or eczema    current medications:        Vital Signs Last 24 Hrs  T(C): 36.3 (15 Dec 2019 15:14), Max: 36.6 (15 Dec 2019 14:04)  T(F): 97.4 (15 Dec 2019 15:14), Max: 97.9 (15 Dec 2019 14:04)  HR: 109 (15 Dec 2019 15:14) (109 - 116)  BP: 136/66 (15 Dec 2019 15:14) (136/66 - 150/93)  BP(mean): --  RR: 29 (15 Dec 2019 15:14) (20 - 29)  SpO2: 94% (15 Dec 2019 15:14) (90% - 94%)    PHYSICAL EXAM:    Constitutional: NAD, well-groomed, well-developed  HEENT: PERRLA, EOMI, Normal Hearing, MMM  Neck: No LAD, No JVD  Back: Normal spine flexure, No CVA tenderness  Respiratory: CTAB/L   Cardiovascular: S1 and S2, RRR, no M/G/R  Gastrointestinal: BS+, soft, NT/ND  Extremities: one plus peripheral edema  Vascular: 2+ peripheral pulses  Neurological: A/O x 3, no focal deficits  Skin: No rashes      LABS:                        13.2   9.54  )-----------( 367      ( 15 Dec 2019 14:35 )             39.5     12-15    124<L>  |  93<L>  |  24<H>  ----------------------------<  117<H>  4.3   |  22  |  1.02    Ca    9.1      15 Dec 2019 14:35  Mg     1.9     12-15    TPro  8.2  /  Alb  3.6  /  TBili  0.5  /  DBili  x   /  AST  36  /  ALT  55  /  AlkPhos  177<H>  12-15    PT/INR - ( 15 Dec 2019 14:35 )   PT: 11.1 sec;   INR: 0.99 ratio         PTT - ( 15 Dec 2019 14:35 )  PTT:32.6 sec    Creatinine Trend: 1.02<--    MICROBIOLOGY:  RECENT CULTURES:        RADIOLOGY & ADDITIONAL STUDIES:    < from: CT Angio Chest w/ IV Cont (12.15.19 @ 15:58) >  EXAM:  CT ANGIO CHEST (W)AW IC                            PROCEDURE DATE:  12/15/2019          INTERPRETATION:  CLINICAL INFORMATION: Shortness of breath. Rule out   pulmonary embolism.    COMPARISON: None.    PROCEDURE:   CT Angiography of the Chest.  76 ml of Omnipaque 350 was injected intravenously. 24 ml were discarded.  Sagittal and coronal reformats were performed as well as 3D (MIP)   reconstructions.      FINDINGS:    LUNGS AND AIRWAYS: Patent central airways.  Compressive atelectasis   involving both lower lobes. Additional foci of atelectasis in the right   middle lobe and lingula. No suspicious nodule or mass. Mild ill-defined   bilateral groundglass opacities suggestive of pulmonary edema.    PLEURA: Large bilateral pleural effusions, right side greater than left.    MEDIASTINUM AND ABILIO: No lymphadenopathy.    VESSELS: Adequate pulmonary arterial opacification. No evidence of   embolism. Normal caliber aorta.    HEART: Heart is enlarged. No pericardial effusion.    CHEST WALL AND LOWER NECK: Within normal limits.    VISUALIZED UPPER ABDOMEN: Within normal limits.    BONES: No acute abnormality.    IMPRESSION:     No pulmonary embolism.  Large bilateral pleural effusions.  Mild pulmonary edema.

## 2019-12-15 NOTE — ED PROVIDER NOTE - PHYSICAL EXAMINATION
Gen: Alert, + tachypneic  Head: NC, AT, PERRL, normal lids/conjunctiva   ENT: Bilateral TM WNL, patent oropharynx without erythema/exudate, uvula midline  Neck: supple, no tenderness/meningismus  Pulm: + diminished with basilar exp wheeze b/l  CV: RRR, no M/R/G, +dist pulses   Abd: soft, NT/ND, +BS, no guarding/rebound tenderness  Mskel: ++ pitting edema  Skin: no rash, no bruising  Neuro: AAOx3, no sensory/motor deficits, CN 2-12 intact

## 2019-12-15 NOTE — ED ADULT NURSE REASSESSMENT NOTE - NS ED NURSE REASSESS COMMENT FT1
pt returned from [facility] resp paged to do ABG Arie aware pt needs to go back for CT

## 2019-12-15 NOTE — H&P ADULT - HISTORY OF PRESENT ILLNESS
· Chief Complaint: The patient is a 89y Female complaining of difficulty breathing.  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 + b/l LE edema x 1 week.  family also noted pt a little more confused in past 4 days. Pt had routine PMD visit and noted hyponatremia and was awaiting fu tomorrow. pt is AOX3, c/o tachypnea, but denies headache, dizziness, cp, abd pain. Pt c/o b/l calf pain. pt normally ambulates with cane up until last week walking with walker.    	ROS: No fever/chills. No photophobia/eye pain/changes in vision, No ear pain/sore throat/dysphagia, No chest pain/palpitations. +SOB, no cough. No abdominal pain, No N/V/D, no black/bloody bm. No dysuria/frequency/discharge, No headache. No Dizziness.  No rash.  No numbness/tingling/  +weakness +  decreased  hearing

## 2019-12-15 NOTE — H&P ADULT - NSHPPHYSICALEXAM_GEN_ALL_CORE
PHYSICAL EXAM:    GENERAL: NAD, well-groomed, well-developed  HEAD:  Atraumatic, Normocephalic  EYES: EOMI, PERRLA, conjunctiva and sclera clear  ENMT: No tonsillar erythema, exudates, or enlargement; Moist mucous membranes, , No lesions  NECK: Supple, No JVD, Normal thyroid  NERVOUS SYSTEM:  Alert & Oriented X4, ; Motor Strength 5/5 B/L upper and lower extremities; DTRs 2+ intact and symmetric  CHEST/LUNG: Clear  bilaterally; No rales, rhonchi, wheezing, or rubs  HEART: Regular rate and rhythm; No murmurs, rubs, or gallops  ABDOMEN: Soft, Nontender, Nondistended; no  masses Bowel sounds present  EXTREMITIES:  2 +  edema bilaterally  LYMPH: No lymphadenopathy noted   RECTAL: deferred    SKIN: No rashes or lesions

## 2019-12-15 NOTE — H&P ADULT - NSHPLABSRESULTS_GEN_ALL_CORE
LABS:                        13.2   9.54  )-----------( 367      ( 15 Dec 2019 14:35 )             39.5     12-15    124<L>  |  93<L>  |  24<H>  ----------------------------<  117<H>  4.3   |  22  |  1.02    Ca    9.1      15 Dec 2019 14:35  Mg     1.9     12-15    TPro  8.2  /  Alb  3.6  /  TBili  0.5  /  DBili  x   /  AST  36  /  ALT  55  /  AlkPhos  177<H>  12-15    PT/INR - ( 15 Dec 2019 14:35 )   PT: 11.1 sec;   INR: 0.99 ratio         PTT - ( 15 Dec 2019 14:35 )  PTT:32.6 sec    < from: CT Angio Chest w/ IV Cont (12.15.19 @ 15:58) >    EXAM:  CT ANGIO CHEST (W)AW IC                            PROCEDURE DATE:  12/15/2019          INTERPRETATION:  CLINICAL INFORMATION: Shortness of breath. Rule out   pulmonary embolism.    COMPARISON: None.    PROCEDURE:   CT Angiography of the Chest.  76 ml of Omnipaque 350 was injected intravenously. 24 ml were discarded.  Sagittal and coronal reformats were performed as well as 3D (MIP)   reconstructions.      FINDINGS:    LUNGS AND AIRWAYS: Patent central airways.  Compressive atelectasis   involving both lower lobes. Additional foci of atelectasis in the right   middle lobe and lingula. No suspicious nodule or mass. Mild ill-defined   bilateral groundglass opacities suggestive of pulmonary edema.    PLEURA: Large bilateral pleural effusions, right side greater than left.    MEDIASTINUM AND ABILIO: No lymphadenopathy.    VESSELS: Adequate pulmonary arterial opacification. No evidence of   embolism. Normal caliber aorta.    HEART: Heart is enlarged. No pericardial effusion.    CHEST WALL AND LOWER NECK: Within normal limits.    VISUALIZED UPPER ABDOMEN: Within normal limits.    BONES: No acute abnormality.    IMPRESSION:     No pulmonary embolism.  Large bilateral pleural effusions.  Mild pulmonary edema.

## 2019-12-15 NOTE — CONSULT NOTE ADULT - ASSESSMENT
89 white female with a history of HTN, CAD, CHF, HLD, now with mental status changes and SOB with cough.   She is now found to have hyponatremia, CHF on CT scan. Hyponatremia is most likely due to dilutional hyponatremia.   Will limit water intake to one liter a day and give her IV lasix 40 mg twice a day. Spoke to the family at bed side. Will follow closely.

## 2019-12-16 LAB
ALBUMIN SERPL ELPH-MCNC: 2.8 G/DL — LOW (ref 3.3–5)
ALP SERPL-CCNC: 141 U/L — HIGH (ref 40–120)
ALT FLD-CCNC: 40 U/L — SIGNIFICANT CHANGE UP (ref 12–78)
ANION GAP SERPL CALC-SCNC: 8 MMOL/L — SIGNIFICANT CHANGE UP (ref 5–17)
AST SERPL-CCNC: 29 U/L — SIGNIFICANT CHANGE UP (ref 15–37)
BILIRUB SERPL-MCNC: 0.5 MG/DL — SIGNIFICANT CHANGE UP (ref 0.2–1.2)
BUN SERPL-MCNC: 19 MG/DL — SIGNIFICANT CHANGE UP (ref 7–23)
CALCIUM SERPL-MCNC: 8.7 MG/DL — SIGNIFICANT CHANGE UP (ref 8.5–10.1)
CHLORIDE SERPL-SCNC: 94 MMOL/L — LOW (ref 96–108)
CHLORIDE UR-SCNC: 95 MMOL/L — SIGNIFICANT CHANGE UP
CO2 SERPL-SCNC: 27 MMOL/L — SIGNIFICANT CHANGE UP (ref 22–31)
CREAT SERPL-MCNC: 0.87 MG/DL — SIGNIFICANT CHANGE UP (ref 0.5–1.3)
CULTURE RESULTS: SIGNIFICANT CHANGE UP
GLUCOSE SERPL-MCNC: 99 MG/DL — SIGNIFICANT CHANGE UP (ref 70–99)
HCT VFR BLD CALC: 34.8 % — SIGNIFICANT CHANGE UP (ref 34.5–45)
HGB BLD-MCNC: 11.8 G/DL — SIGNIFICANT CHANGE UP (ref 11.5–15.5)
MCHC RBC-ENTMCNC: 28.1 PG — SIGNIFICANT CHANGE UP (ref 27–34)
MCHC RBC-ENTMCNC: 33.9 GM/DL — SIGNIFICANT CHANGE UP (ref 32–36)
MCV RBC AUTO: 82.9 FL — SIGNIFICANT CHANGE UP (ref 80–100)
NRBC # BLD: 0 /100 WBCS — SIGNIFICANT CHANGE UP (ref 0–0)
OSMOLALITY UR: 379 MOSM/KG — SIGNIFICANT CHANGE UP (ref 50–1200)
PLATELET # BLD AUTO: 319 K/UL — SIGNIFICANT CHANGE UP (ref 150–400)
POTASSIUM SERPL-MCNC: 3.5 MMOL/L — SIGNIFICANT CHANGE UP (ref 3.5–5.3)
POTASSIUM SERPL-SCNC: 3.5 MMOL/L — SIGNIFICANT CHANGE UP (ref 3.5–5.3)
POTASSIUM UR-SCNC: 23.6 MMOL/L — SIGNIFICANT CHANGE UP
PROT SERPL-MCNC: 6.6 GM/DL — SIGNIFICANT CHANGE UP (ref 6–8.3)
RBC # BLD: 4.2 M/UL — SIGNIFICANT CHANGE UP (ref 3.8–5.2)
RBC # FLD: 15.2 % — HIGH (ref 10.3–14.5)
SODIUM SERPL-SCNC: 129 MMOL/L — LOW (ref 135–145)
SODIUM UR-SCNC: 78 MMOL/L — SIGNIFICANT CHANGE UP
SPECIMEN SOURCE: SIGNIFICANT CHANGE UP
T3 SERPL-MCNC: 62 NG/DL — LOW (ref 80–200)
T4 AB SER-ACNC: 10.5 UG/DL — SIGNIFICANT CHANGE UP (ref 4.6–12)
WBC # BLD: 8.07 K/UL — SIGNIFICANT CHANGE UP (ref 3.8–10.5)
WBC # FLD AUTO: 8.07 K/UL — SIGNIFICANT CHANGE UP (ref 3.8–10.5)

## 2019-12-16 RX ADMIN — SODIUM CHLORIDE 1 GRAM(S): 9 INJECTION INTRAMUSCULAR; INTRAVENOUS; SUBCUTANEOUS at 05:42

## 2019-12-16 RX ADMIN — ATENOLOL 25 MILLIGRAM(S): 25 TABLET ORAL at 05:42

## 2019-12-16 RX ADMIN — Medication 40 MILLIGRAM(S): at 18:04

## 2019-12-16 RX ADMIN — Medication 40 MILLIGRAM(S): at 05:42

## 2019-12-16 RX ADMIN — AMLODIPINE BESYLATE 5 MILLIGRAM(S): 2.5 TABLET ORAL at 12:00

## 2019-12-16 NOTE — PROGRESS NOTE ADULT - SUBJECTIVE AND OBJECTIVE BOX
INTERVAL HPI/OVERNIGHT EVENTS:        REVIEW OF SYSTEMS:  CONSTITUTIONAL:   feels  ok    NECK: No pain or stiffnes  RESPIRATORY: No SOB   CARDIOVASCULAR: No chest pain, palpitations, dizziness,   GASTROINTESTINAL: No abdominal pain. No nausea, vomiting,   NEUROLOGICAL: No headaches, no  blurry  vision no  dizziness  SKIN: No itching,   MUSCULOSKELETAL:  mild  low  back  pain    MEDICATION:  amLODIPine   Tablet 5 milliGRAM(s) Oral daily  ATENolol  Tablet 25 milliGRAM(s) Oral every 12 hours  furosemide   Injectable 40 milliGRAM(s) IV Push two times a day  sodium chloride 1 Gram(s) Oral two times a day    Vital Signs Last 24 Hrs  T(C): 36.3 (16 Dec 2019 05:03), Max: 36.6 (15 Dec 2019 14:04)  T(F): 97.4 (16 Dec 2019 05:03), Max: 97.9 (15 Dec 2019 14:04)  HR: 124 (16 Dec 2019 05:03) (81 - 124)  BP: 119/86 (16 Dec 2019 05:03) (117/74 - 150/93)  BP(mean): --  RR: 16 (16 Dec 2019 05:03) (16 - 29)  SpO2: 94% (16 Dec 2019 05:03) (90% - 95%)    PHYSICAL EXAM:  GENERAL: NAD, well-groomed, well-developed  EYES:  conjunctiva and sclera clear  ENMT:  Moist mucous membranes,   NECK: Supple, No JVD, Normal thyroid  NERVOUS SYSTEM:  Alert oriented   no  focal  deficits;   CHEST/LUNG: decreased  BS bases  HEART: Regular rate and rhythm; No murmurs, rubs, or gallops  ABDOMEN: Soft, Nontender, Nondistended; Bowel sounds present  EXTREMITIES:  no  edema no  tenderness  SKIN: No rashes   LABS:                        13.2   9.54  )-----------( 367      ( 15 Dec 2019 14:35 )             39.5     12-15    124<L>  |  93<L>  |  24<H>  ----------------------------<  117<H>  4.3   |  22  |  1.02    Ca    9.1      15 Dec 2019 14:35  Mg     1.9     12-15    TPro  8.2  /  Alb  3.6  /  TBili  0.5  /  DBili  x   /  AST  36  /  ALT  55  /  AlkPhos  177<H>  12-15    PT/INR - ( 15 Dec 2019 14:35 )   PT: 11.1 sec;   INR: 0.99 ratio         PTT - ( 15 Dec 2019 14:35 )  PTT:32.6 sec  Urinalysis Basic - ( 15 Dec 2019 17:27 )    Color: Yellow / Appearance: Clear / S.015 / pH: x  Gluc: x / Ketone: Negative  / Bili: Negative / Urobili: Negative mg/dL   Blood: x / Protein: 15 mg/dL / Nitrite: Negative   Leuk Esterase: Negative / RBC: x / WBC x   Sq Epi: x / Non Sq Epi: Few / Bacteria: Few      CAPILLARY BLOOD GLUCOSE          RADIOLOGY & ADDITIONAL TESTS:    Imaging reports  Personally Reviewed:  [x ] YES  [ ] NO    Consultant(s) Notes Reviewed:  [ x] YES  [ ] NO    Care Discussed with Consultants/Other Providers [x ] YES  [ ] NO  Assessment and Plan:   Problem/Plan - 1:  ·  Problem: Pulmonary edema.  Plan: lasix  betablocker  tte  cardio  eval.     Problem/Plan - 2:  ·  Problem: Pleural effusion.  Plan: lasix monitor  labs.     Problem/Plan - 3:  ·  Problem: Hyponatremia.  Plan: fluid  restrict  continue  Na tabs  check urine osmolality  Na  K  Cl.    Problem/Plan - 4:  ·  Problem: Hypothyroidism.  Plan: continue  synthroid chack levels.    Problem/Plan - 5:  ·  Problem: Atrial fibrillation.  Plan: rate  control.     Problem/Plan - 6:  Problem: HLD (hyperlipidemia). Plan: no clinical advantage  to primary  prevention will d/c.

## 2019-12-16 NOTE — PHYSICAL THERAPY INITIAL EVALUATION ADULT - STRENGTHENING, PT EVAL
Pt will improve muscle strength in all extremities to WFL in 1 to 2 weeks to perform Gait & ADL with supervision using a cane

## 2019-12-16 NOTE — PHYSICAL THERAPY INITIAL EVALUATION ADULT - WEIGHT-BEARING RESTRICTIONS: GAIT, REHAB EVAL
Patient:   KELLY BERMUDEZ            MRN: [id number]            FIN: [id number]               Age:   3 years     Sex:  MALE     :  [birth year]   Associated Diagnoses:   None   Author:   GEORGE WHITT        Date of surgery: 3/13/2018    Preoperative Diagnosis: Obstuctive Sleep Apnea, adenotonsillar hypertrophy    Postoperative Diagnosis: same    Procedure: Adenotonsillectomy    Surgeon: George Abreu M.D.    Assistant: none    Anesthesia: General Endotracheal Anesthesia    Findings: 3+ tonsils bilaterally, symmetric; moderate adenoid hypertrophy    Specimens: tonsils    Complications: none    Estimated Blood Loss:15 mL    Indication for procedure: The patient is a 3 year old male with obstructive sleep apnea not responsive to medical management and adenotonsillar hypertrophy recognized on clinical examination.       Details of procedure: After identity was confirmed in the preoperative area and consent was reviewed and signed, the patient was taken to the operating room and care was assumed by the Anesthesia team. The patient was intubated atraumatically, eyes were protected with tape, and a formal timeout was conducted.    The patient was positioned, the head was draped, and a Moses-Ji retractor was placed into the oral cavity. A red rubber catheter was used to retract the soft palate, and attention was turned to the adenoid. Using a laryngeal mirror, the adenoid was inspected and then ablated using suction monopolar cautery. Great care was taken to identify and preserve the brannon tubarius bilaterally.  When hemostasis was achieved, tension was released from the red rubber catheter.    Attention was then turned to the tonsils. Starting on the left side, the tonsil was retracted medially, and monopolar cautery was used to dissect between the tonsil and the underlying fascia in an avascular plane. Hemostasis was achieved with the cautery, and then the procedure was repeated on the right. Care  was taken to preserve the anterior and posterior pillars and the underlying fascia. Marcaine with epinephrine was injected into the tonsillar fossae bilaterally.    At this point, the adenoid was again inspected. Then, the Moses-Ji retractor was let down and resuspended several times and the tonsillar fossae were gently rubbed with a tonsil sponge to interrogate hemostasis. As there was no additional bleeding, the retractor and red rubber catheter were removed, the patient was inspected, and care was returned to Anesthesia.    The patient was extubated uneventfully and taken to the PACU in stable condition.                                      Electronically Signed On 03/20/2018 07:07  __________________________________________________   PERI, GEORGE AVALOS     full weight-bearing

## 2019-12-16 NOTE — PROGRESS NOTE ADULT - SUBJECTIVE AND OBJECTIVE BOX
[facility] NEPHROLOGY SERVICES, [facility]  NEPHROLOGY AND HYPERTENSION  [address]  [address]  [place] [address]  [phone]    MD GUY ARECHIGA, MD LOUIS GARCIA, MD SHERRY YEN, MD WOLF HERNANDEZ, MD RESHMA DAS MD          Patient appears comfORTABLE    MEDICATIONS  (STANDING):  amLODIPine   Tablet 5 milliGRAM(s) Oral daily  ATENolol  Tablet 25 milliGRAM(s) Oral every 12 hours  furosemide   Injectable 40 milliGRAM(s) IV Push two times a day  sodium chloride 1 Gram(s) Oral two times a day    MEDICATIONS  (PRN):      12-15-19 @ 07:01  -  12-16-19 @ 07:00  --------------------------------------------------------  IN: 0 mL / OUT: 1500 mL / NET: -1500 mL      PHYSICAL EXAM:      T(C): 36.1 (12-16-19 @ 17:00), Max: 36.4 (12-15-19 @ 19:00)  HR: 109 (12-16-19 @ 17:00) (81 - 124)  BP: 90/67 (12-16-19 @ 17:00) (90/67 - 132/78)  RR: 18 (12-16-19 @ 17:00) (16 - 26)  SpO2: 92% (12-16-19 @ 17:00) (92% - 99%)  Wt(kg): --  Respiratory: clear anteriorly, decreased BS at bases  Cardiovascular: S1 S2  Gastrointestinal: soft NT ND +BS  Extremities:   1 edema                                    11.8   8.07  )-----------( 319      ( 16 Dec 2019 08:17 )             34.8     12-16    129<L>  |  94<L>  |  19  ----------------------------<  99  3.5   |  27  |  0.87    Ca    8.7      16 Dec 2019 08:17  Mg     1.9     12-15    TPro  6.6  /  Alb  2.8<L>  /  TBili  0.5  /  DBili  x   /  AST  29  /  ALT  40  /  AlkPhos  141<H>  12-16    ABG - ( 15 Dec 2019 15:30 )  pH, Arterial: x     pH, Blood: 7.40  /  pCO2: 34    /  pO2: 72    / HCO3: 21    / Base Excess: -3.0  /  SaO2: 94                LIVER FUNCTIONS - ( 16 Dec 2019 08:17 )  Alb: 2.8 g/dL / Pro: 6.6 gm/dL / ALK PHOS: 141 U/L / ALT: 40 U/L / AST: 29 U/L / GGT: x           Creatinine Trend: 0.87<--, 1.02<--      Assessment   Hypervolemic hyponatremia, improving;     Plan:  Continued loop diuretic    Db Huang MD

## 2019-12-16 NOTE — PHYSICAL THERAPY INITIAL EVALUATION ADULT - PLANNED THERAPY INTERVENTIONS, PT EVAL
Pt will be able to negotiate 5 steps with right sided cane, left sided HHA with supervision in 1 week/balance training/bed mobility training/gait training/strengthening/transfer training

## 2019-12-16 NOTE — PHYSICAL THERAPY INITIAL EVALUATION ADULT - CRITERIA FOR SKILLED THERAPEUTIC INTERVENTIONS
functional limitations in following categories/risk reduction/prevention/home with home PT/anticipated equipment needs at discharge/therapy frequency/anticipated discharge recommendation/impairments found/rehab potential/predicted duration of therapy intervention

## 2019-12-16 NOTE — PHYSICAL THERAPY INITIAL EVALUATION ADULT - ADDITIONAL COMMENTS
As per pt, She lives in her hopuse with daughter, her , with 5 steps to enter into the house with no handrails, she can negotiate stairs by HHA on left side, right sided cane, once inside there are steps to go to 2nd floor but pt does not use it as it is daughter's bed room, Indoors pt use to ambulate without cane most of the times, but outdoors she uses cane at times,  she needs assist from her daughter for ADLs

## 2019-12-16 NOTE — CONSULT NOTE ADULT - SUBJECTIVE AND OBJECTIVE BOX
HPI:  HPI:  · Chief Complaint: The patient is a 89y Female complaining of difficulty breathing.  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 + b/l LE edema x 1 week.  family also noted pt a little more confused in past 4 days. Pt had routine PMD visit and noted hyponatremia and was awaiting fu tomorrow. pt is AOX3, c/o tachypnea, but denies headache, dizziness, cp, abd pain. Pt c/o b/l calf pain. pt normally ambulates with cane up until last week walking with walker.    	ROS: No fever/chills. No photophobia/eye pain/changes in vision, No ear pain/sore throat/dysphagia, No chest pain/palpitations. +SOB, no cough. No abdominal pain, No N/V/D, no black/bloody bm. No dysuria/frequency/discharge, No headache. No Dizziness.  No rash.  No numbness/tingling/  +weakness +  decreased  hearing (15 Dec 2019 16:54)      Chief Complaint:  Patient is a 89y old  Female who presents with a chief complaint of sob  ryder  hyponatremia pulmonary  edema (16 Dec 2019 17:14)      Review of Systems:    General:  No wt loss, fevers, chills, night sweats  Eyes:  Good vision, no reported pain  ENT:  No sore throat, pain, runny nose, dysphagia  CV:   palpitations,   Resp:   dyspnea,   GI:  No pain, nausea, vomiting, diarrhea, constipation           Social History/Family History  SOCHX:   tobacco,  -  alcohol    FMHX: FA/MO  - contributory       Discussed with:  PMD, Family    Physical Exam:    Vital Signs:  Vital Signs Last 24 Hrs  T(C): 36.3 (17 Dec 2019 05:08), Max: 36.6 (16 Dec 2019 23:30)  T(F): 97.3 (17 Dec 2019 05:08), Max: 97.8 (16 Dec 2019 23:30)  HR: 78 (17 Dec 2019 05:08) (78 - 109)  BP: 112/65 (17 Dec 2019 05:08) (90/67 - 120/66)  BP(mean): --  RR: 16 (17 Dec 2019 05:08) (16 - 18)  SpO2: 93% (17 Dec 2019 05:08) (92% - 99%)  Daily     Daily Weight in k.4 (17 Dec 2019 05:08)  I&O's Summary    16 Dec 2019 07:01  -  17 Dec 2019 07:00  --------------------------------------------------------  IN: 220 mL / OUT: 0 mL / NET: 220 mL        Chest:  decreased at bases  Cardiovascular:  irRegular rhythm, S1, S2, no murmur/rub/S3/S4, no carotid/femoral/abdominal bruit, radial/pedal pulses 2+,  edema  Abdomen:  Soft, non-tender, non-distended, normoactive bowel sounds, no HSM      Laboratory:                          11.8   8.07  )-----------( 319      ( 16 Dec 2019 08:17 )             34.8     12-16    129<L>  |  94<L>  |  19  ----------------------------<  99  3.5   |  27  |  0.87    Ca    8.7      16 Dec 2019 08:17  Mg     1.9     12-15    TPro  6.6  /  Alb  2.8<L>  /  TBili  0.5  /  DBili  x   /  AST  29  /  ALT  40  /  AlkPhos  141<H>  12-16    ABG - ( 15 Dec 2019 15:30 )  pH, Arterial: x     pH, Blood: 7.40  /  pCO2: 34    /  pO2: 72    / HCO3: 21    / Base Excess: -3.0  /  SaO2: 94                CARDIAC MARKERS ( 15 Dec 2019 14:35 )  <.015 ng/mL / x     / x     / x     / x          CAPILLARY BLOOD GLUCOSE        LIVER FUNCTIONS - ( 16 Dec 2019 08:17 )  Alb: 2.8 g/dL / Pro: 6.6 gm/dL / ALK PHOS: 141 U/L / ALT: 40 U/L / AST: 29 U/L / GGT: x           PT/INR - ( 15 Dec 2019 14:35 )   PT: 11.1 sec;   INR: 0.99 ratio         PTT - ( 15 Dec 2019 14:35 )  PTT:32.6 sec  Urinalysis Basic - ( 15 Dec 2019 17:27 )    Color: Yellow / Appearance: Clear / S.015 / pH: x  Gluc: x / Ketone: Negative  / Bili: Negative / Urobili: Negative mg/dL   Blood: x / Protein: 15 mg/dL / Nitrite: Negative   Leuk Esterase: Negative / RBC: x / WBC x   Sq Epi: x / Non Sq Epi: Few / Bacteria: Few      Assessment:  I am asked for consultation on this patient with edema and pleural effusions  The BNP is approximately in the 6000 range, cardiac enzymes remain normal  The EKG reveals atrial fibrillation  Lasix IV push is initiated and will continue  nephrology consultation and noted,Patient's sodium is slowly improving  heart rate and systolic blood pressure remained stable

## 2019-12-16 NOTE — CONSULT NOTE ADULT - SUBJECTIVE AND OBJECTIVE BOX
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 daughter at bedside (lives with patietn) who reports gradual weakness and increased sob as well as le edema. she felt it was worse and brought her to er.  family also noted pt a little more confused in past 4 days prior to admission. daughter feels she is starting to approach her baseline now. per daughter has fall (on eliquis) in aug 2019 and had "brain hemmorhage" she has been recovering from that and walks with a cane or RW at home. patient was admitted to telemetry under care of dr clayton - followed by nephrology  - NA tablets, fluid restriction and diuresis  patient reports " i am ok - i want to walk"  patient has not yet been evaluated by physical therapy     ROS: No fever/chills. No photophobia/eye pain/changes in vision, No ear pain/sore throat/dysphagia, No chest pain/palpitations. no cough. No abdominal pain, No N/V/D,  No dysuria/frequency/discharge, - reports mild urinary retention (chronic) ,  No headache. No Dizziness.  No rash.  No numbness/tingling/  +weakness +  decreased  hearing   reports s/p bilateral cataract sx with great results - uses glasses for reading only , reports no sob today with oxygen - not on home oxygen  feels her memory is slightly impaired  , no depression         PAST MEDICAL & SURGICAL HISTORY  Atrial fibrillation  Hypothyroidism  HTN (hypertension)  HLD (hyperlipidemia)  SAH/IVH 8/19   No significant past surgical history      FAMILY HISTORY:  No pertinent family history in first degree relatives    SOCHX: functional   does not smoke  lives in private house with daughter - 4 steps to enter - bedroom and br on first floor   prior to admission - used sac inside and RW outside   was able to perform basic adls independently - daughter assisted with cooking and shopping          ALLERGIES  No Known Allergies      MEDICATIONS reviewed  MEDICATIONS  (STANDING):  amLODIPine   Tablet 5 milliGRAM(s) Oral daily  ATENolol  Tablet 25 milliGRAM(s) Oral every 12 hours  furosemide   Injectable 40 milliGRAM(s) IV Push two times a day  sodium chloride 1 Gram(s) Oral two times a day    MEDICATIONS  (PRN):      --------------------------------------------------------------------  VITALS  T(C): 36.3 (12-16-19 @ 05:03), Max: 36.6 (12-15-19 @ 14:04)  HR: 124 (12-16-19 @ 05:03) (81 - 124)  BP: 119/86 (12-16-19 @ 05:03) (117/74 - 150/93)  RR: 16 (12-16-19 @ 05:03) (16 - 29)  SpO2: 94% (12-16-19 @ 05:03) (90% - 95%)        PHYSICAL EXAM  Constitutional - NAD, Comfortable  HEENT - NCAT, EOMI  Neck - Supple, functional ROM  Cardiovascular irregular hr  Abdomen - Soft, Not tender  Extremities - bilateral le - minimal to moderate distal edema - soft, NT , able to straight leg raise bilaterally without pain but proximal strength limited to 4-/5 bilaterally knees - no warmth, functional rom   bilateral ue - chronic arthritic changes multiple digits - no warmth or bogginess  intact, functional rom , no focal motor deficits   Neurologic -                    Cognitive - Awake, Alert, Oriented times 3      Speech Intact - no dysarthria      Language  Intact - no word finding difficulties      Motor - No focal deficits     Sensory - Intact to LT, proprioception intact bilateral feet      Psychiatric - Mood stable, Affect WNL  labs reviewed -  today (up from 124)     IMAGING reviewed: doppler bilateral le - negative for dvt  CT angio - negative for PE  large bilateral pleural effusions  head CT - no acute changes      A/P 89 year old previously ambulating with RW/SAC and independent with basic adls - now with deconditioning s/p hospitalization for hyponatremia, SOB, pleural effusions. patient had been functionally declining in the week prior to admission further limiting her ambulation. she feels she is ready to ambulate.        Will follow - recommendations will depend upon clinical and functional course once she is medically cleared for physical therapy. i reviewed the goals and plans with the patient and daughter a the bedside. we reviewed her prognosis and plans for her to return to baseline. will monitor and d/w dr clayton

## 2019-12-16 NOTE — PHYSICAL THERAPY INITIAL EVALUATION ADULT - BALANCE TRAINING, PT EVAL
Pt will improve static & dynamic standing balance to Good using  a cane  without LOB  to perform ADL, Gait with supervision in 2 weeks

## 2019-12-16 NOTE — PHYSICAL THERAPY INITIAL EVALUATION ADULT - TRANSFER TRAINING, PT EVAL
Pt will be able to perform sit to stand, stand pivot transfer using  a cane  and  without LOB   with supervision  in 1 week

## 2019-12-17 LAB
ACANTHOCYTES BLD QL SMEAR: SLIGHT — SIGNIFICANT CHANGE UP
ANION GAP SERPL CALC-SCNC: 7 MMOL/L — SIGNIFICANT CHANGE UP (ref 5–17)
ANION GAP SERPL CALC-SCNC: 8 MMOL/L — SIGNIFICANT CHANGE UP (ref 5–17)
ANISOCYTOSIS BLD QL: SLIGHT — SIGNIFICANT CHANGE UP
BASE EXCESS BLDA CALC-SCNC: 4 MMOL/L — HIGH (ref -2–2)
BASOPHILS # BLD AUTO: 0 K/UL — SIGNIFICANT CHANGE UP (ref 0–0.2)
BASOPHILS NFR BLD AUTO: 0 % — SIGNIFICANT CHANGE UP (ref 0–2)
BLOOD GAS COMMENTS: SIGNIFICANT CHANGE UP
BLOOD GAS COMMENTS: SIGNIFICANT CHANGE UP
BLOOD GAS SOURCE: SIGNIFICANT CHANGE UP
BUN SERPL-MCNC: 21 MG/DL — SIGNIFICANT CHANGE UP (ref 7–23)
BUN SERPL-MCNC: 30 MG/DL — HIGH (ref 7–23)
CALCIUM SERPL-MCNC: 8.3 MG/DL — LOW (ref 8.5–10.1)
CALCIUM SERPL-MCNC: 8.9 MG/DL — SIGNIFICANT CHANGE UP (ref 8.5–10.1)
CHLORIDE SERPL-SCNC: 94 MMOL/L — LOW (ref 96–108)
CHLORIDE SERPL-SCNC: 96 MMOL/L — SIGNIFICANT CHANGE UP (ref 96–108)
CO2 SERPL-SCNC: 27 MMOL/L — SIGNIFICANT CHANGE UP (ref 22–31)
CO2 SERPL-SCNC: 31 MMOL/L — SIGNIFICANT CHANGE UP (ref 22–31)
CREAT SERPL-MCNC: 0.99 MG/DL — SIGNIFICANT CHANGE UP (ref 0.5–1.3)
CREAT SERPL-MCNC: 1.4 MG/DL — HIGH (ref 0.5–1.3)
D DIMER BLD IA.RAPID-MCNC: 669 NG/ML DDU — HIGH
ELLIPTOCYTES BLD QL SMEAR: SLIGHT — SIGNIFICANT CHANGE UP
EOSINOPHIL # BLD AUTO: 0.1 K/UL — SIGNIFICANT CHANGE UP (ref 0–0.5)
EOSINOPHIL NFR BLD AUTO: 1 % — SIGNIFICANT CHANGE UP (ref 0–6)
GLUCOSE BLDC GLUCOMTR-MCNC: 118 MG/DL — HIGH (ref 70–99)
GLUCOSE SERPL-MCNC: 120 MG/DL — HIGH (ref 70–99)
GLUCOSE SERPL-MCNC: 99 MG/DL — SIGNIFICANT CHANGE UP (ref 70–99)
HCO3 BLDA-SCNC: 28 MMOL/L — SIGNIFICANT CHANGE UP (ref 21–29)
HCT VFR BLD CALC: 33.7 % — LOW (ref 34.5–45)
HCT VFR BLD CALC: 35.8 % — SIGNIFICANT CHANGE UP (ref 34.5–45)
HGB BLD-MCNC: 11.3 G/DL — LOW (ref 11.5–15.5)
HGB BLD-MCNC: 11.9 G/DL — SIGNIFICANT CHANGE UP (ref 11.5–15.5)
HOROWITZ INDEX BLDA+IHG-RTO: 100 — SIGNIFICANT CHANGE UP
LYMPHOCYTES # BLD AUTO: 0.7 K/UL — LOW (ref 1–3.3)
LYMPHOCYTES # BLD AUTO: 7 % — LOW (ref 13–44)
MACROCYTES BLD QL: SLIGHT — SIGNIFICANT CHANGE UP
MAGNESIUM SERPL-MCNC: 2 MG/DL — SIGNIFICANT CHANGE UP (ref 1.6–2.6)
MANUAL SMEAR VERIFICATION: SIGNIFICANT CHANGE UP
MCHC RBC-ENTMCNC: 27.9 PG — SIGNIFICANT CHANGE UP (ref 27–34)
MCHC RBC-ENTMCNC: 28.3 PG — SIGNIFICANT CHANGE UP (ref 27–34)
MCHC RBC-ENTMCNC: 33.2 GM/DL — SIGNIFICANT CHANGE UP (ref 32–36)
MCHC RBC-ENTMCNC: 33.5 GM/DL — SIGNIFICANT CHANGE UP (ref 32–36)
MCV RBC AUTO: 83.8 FL — SIGNIFICANT CHANGE UP (ref 80–100)
MCV RBC AUTO: 84.5 FL — SIGNIFICANT CHANGE UP (ref 80–100)
MICROCYTES BLD QL: SLIGHT — SIGNIFICANT CHANGE UP
MONOCYTES # BLD AUTO: 1.09 K/UL — HIGH (ref 0–0.9)
MONOCYTES NFR BLD AUTO: 11 % — SIGNIFICANT CHANGE UP (ref 2–14)
NEUTROPHILS # BLD AUTO: 8.04 K/UL — HIGH (ref 1.8–7.4)
NEUTROPHILS NFR BLD AUTO: 81 % — HIGH (ref 43–77)
NRBC # BLD: 0 /100 WBCS — SIGNIFICANT CHANGE UP (ref 0–0)
NRBC # BLD: 0 /100 — SIGNIFICANT CHANGE UP (ref 0–0)
NRBC # BLD: SIGNIFICANT CHANGE UP /100 WBCS (ref 0–0)
OVALOCYTES BLD QL SMEAR: SLIGHT — SIGNIFICANT CHANGE UP
PCO2 BLDA: 41 MMHG — SIGNIFICANT CHANGE UP (ref 32–46)
PH BLD: 7.44 — SIGNIFICANT CHANGE UP (ref 7.35–7.45)
PHOSPHATE SERPL-MCNC: 3.7 MG/DL — SIGNIFICANT CHANGE UP (ref 2.5–4.5)
PLAT MORPH BLD: NORMAL — SIGNIFICANT CHANGE UP
PLATELET # BLD AUTO: 296 K/UL — SIGNIFICANT CHANGE UP (ref 150–400)
PLATELET # BLD AUTO: 353 K/UL — SIGNIFICANT CHANGE UP (ref 150–400)
PO2 BLDA: 69 MMHG — LOW (ref 74–108)
POTASSIUM SERPL-MCNC: 3.2 MMOL/L — LOW (ref 3.5–5.3)
POTASSIUM SERPL-MCNC: 5.5 MMOL/L — HIGH (ref 3.5–5.3)
POTASSIUM SERPL-SCNC: 3.2 MMOL/L — LOW (ref 3.5–5.3)
POTASSIUM SERPL-SCNC: 5.5 MMOL/L — HIGH (ref 3.5–5.3)
RBC # BLD: 3.99 M/UL — SIGNIFICANT CHANGE UP (ref 3.8–5.2)
RBC # BLD: 4.27 M/UL — SIGNIFICANT CHANGE UP (ref 3.8–5.2)
RBC # FLD: 15.1 % — HIGH (ref 10.3–14.5)
RBC # FLD: 15.4 % — HIGH (ref 10.3–14.5)
RBC BLD AUTO: ABNORMAL
SAO2 % BLDA: 93 % — SIGNIFICANT CHANGE UP (ref 92–96)
SODIUM SERPL-SCNC: 131 MMOL/L — LOW (ref 135–145)
SODIUM SERPL-SCNC: 132 MMOL/L — LOW (ref 135–145)
WBC # BLD: 7.63 K/UL — SIGNIFICANT CHANGE UP (ref 3.8–10.5)
WBC # BLD: 9.93 K/UL — SIGNIFICANT CHANGE UP (ref 3.8–10.5)
WBC # FLD AUTO: 7.63 K/UL — SIGNIFICANT CHANGE UP (ref 3.8–10.5)
WBC # FLD AUTO: 9.93 K/UL — SIGNIFICANT CHANGE UP (ref 3.8–10.5)

## 2019-12-17 PROCEDURE — 71045 X-RAY EXAM CHEST 1 VIEW: CPT | Mod: 26

## 2019-12-17 PROCEDURE — 93306 TTE W/DOPPLER COMPLETE: CPT | Mod: 26

## 2019-12-17 PROCEDURE — 99233 SBSQ HOSP IP/OBS HIGH 50: CPT

## 2019-12-17 PROCEDURE — 93010 ELECTROCARDIOGRAM REPORT: CPT

## 2019-12-17 RX ORDER — SENNA PLUS 8.6 MG/1
2 TABLET ORAL AT BEDTIME
Refills: 0 | Status: DISCONTINUED | OUTPATIENT
Start: 2019-12-17 | End: 2019-12-21

## 2019-12-17 RX ORDER — BENZOCAINE AND MENTHOL 5; 1 G/100ML; G/100ML
1 LIQUID ORAL EVERY 6 HOURS
Refills: 0 | Status: DISCONTINUED | OUTPATIENT
Start: 2019-12-17 | End: 2019-12-21

## 2019-12-17 RX ORDER — POTASSIUM CHLORIDE 20 MEQ
40 PACKET (EA) ORAL EVERY 4 HOURS
Refills: 0 | Status: COMPLETED | OUTPATIENT
Start: 2019-12-17 | End: 2019-12-17

## 2019-12-17 RX ORDER — FUROSEMIDE 40 MG
40 TABLET ORAL ONCE
Refills: 0 | Status: COMPLETED | OUTPATIENT
Start: 2019-12-17 | End: 2019-12-17

## 2019-12-17 RX ORDER — HEPARIN SODIUM 5000 [USP'U]/ML
5000 INJECTION INTRAVENOUS; SUBCUTANEOUS EVERY 12 HOURS
Refills: 0 | Status: DISCONTINUED | OUTPATIENT
Start: 2019-12-17 | End: 2019-12-21

## 2019-12-17 RX ORDER — MAGNESIUM HYDROXIDE 400 MG/1
30 TABLET, CHEWABLE ORAL ONCE
Refills: 0 | Status: COMPLETED | OUTPATIENT
Start: 2019-12-17 | End: 2019-12-17

## 2019-12-17 RX ADMIN — AMLODIPINE BESYLATE 5 MILLIGRAM(S): 2.5 TABLET ORAL at 05:12

## 2019-12-17 RX ADMIN — Medication 40 MILLIGRAM(S): at 22:14

## 2019-12-17 RX ADMIN — BENZOCAINE AND MENTHOL 1 LOZENGE: 5; 1 LIQUID ORAL at 02:56

## 2019-12-17 RX ADMIN — Medication 40 MILLIGRAM(S): at 05:12

## 2019-12-17 RX ADMIN — MAGNESIUM HYDROXIDE 30 MILLILITER(S): 400 TABLET, CHEWABLE ORAL at 02:56

## 2019-12-17 RX ADMIN — ATENOLOL 25 MILLIGRAM(S): 25 TABLET ORAL at 05:12

## 2019-12-17 RX ADMIN — Medication 40 MILLIEQUIVALENT(S): at 13:19

## 2019-12-17 RX ADMIN — SENNA PLUS 2 TABLET(S): 8.6 TABLET ORAL at 22:14

## 2019-12-17 RX ADMIN — ATENOLOL 25 MILLIGRAM(S): 25 TABLET ORAL at 17:06

## 2019-12-17 RX ADMIN — Medication 40 MILLIEQUIVALENT(S): at 17:06

## 2019-12-17 RX ADMIN — Medication 40 MILLIGRAM(S): at 17:07

## 2019-12-17 RX ADMIN — HEPARIN SODIUM 5000 UNIT(S): 5000 INJECTION INTRAVENOUS; SUBCUTANEOUS at 17:07

## 2019-12-17 RX ADMIN — Medication 40 MILLIEQUIVALENT(S): at 10:45

## 2019-12-17 NOTE — CONSULT NOTE ADULT - ASSESSMENT
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  Noted to have increasing shortness of breath currently with ongoing diuresis noted to have admission weight of 58.1 and despite diuresis noted to have weight of 58.4 today.      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  Noted to have increasing shortness of breath currently with ongoing diuresis noted to have admission weight of 58.1 and despite diuresis noted to have weight of 58.4 today.      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  Noted to have increasing shortness of breath currently with ongoing diuresis noted to have admission weight of 58.1 and despite diuresis noted to have weight of 58.4 today.      - Place patient on BIPAP 10/5/40%  - Give Lasix 40mg IVP x 1  - Strict I and Os  - Daily weights  - Follow up ABG ~2 hours post BIPAP  - Discussed with ICU PA who will see patient overnight.    Addendum  2330: Patient seen after 1 hour on BIPAP, noted to be more comfortable saturating 92-95% on BIPAP 10/5/40%, speaking in full sentences.  ABG pending.  Pending ICU re-eval.

## 2019-12-17 NOTE — CONSULT NOTE ADULT - SUBJECTIVE AND OBJECTIVE BOX
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  Pt was admitted for hyponatremia, with bilateral pleural effusions and pulmonary edema.  Pt today was noted to have RRT for acute shortness of breath.  Patient has been receiving Lasix 40mg IVP Q12H, last dose at 1700 today, and  fluid restriction for hyponatremia.  During RRT patient was noted to desaturate to 85%.      Patient seen and examined at 2130, noted to be on non-rebreather 15L, saturating 100%.  Titrated down to 35% ventimask saturating 88-90%.  Pt was speaking in full sentences with no acute distress no tachypnea.  + Shortness of breath No fever/chills, No photophobia/eye pain/changes in vision, No ear pain/sore throat/dysphagia, No chest pain/palpitations, no cough/wheeze/stridor, No abdominal pain, No N/V/D, no dysuria/frequency/discharge, No neck/back pain, no rash, no changes in neurological status/function.       Allergies    No Known Allergies    Intolerances        MEDICATIONS  NEURO  Meds:   RESPIRATORY  ABG - ( 17 Dec 2019 19:59 )  pH: x     /  pCO2: 41    /  pO2: 69    / HCO3: 28    / Base Excess: 4.0   /  SaO2: 93      Lactate: x                Meds:   CARDIOVASCULAR  Meds: amLODIPine   Tablet 5 milliGRAM(s) Oral daily  ATENolol  Tablet 25 milliGRAM(s) Oral every 12 hours  furosemide   Injectable 40 milliGRAM(s) IV Push two times a day    GI/NUTRITION  Meds: senna 2 Tablet(s) Oral at bedtime    GENITOURINARY  Meds:   HEMATOLOGIC  Meds: heparin  Injectable 5000 Unit(s) SubCutaneous every 12 hours    [x] VTE Prophylaxis  INFECTIOUS DISEASES  Meds:   ENDOCRINE  CAPILLARY BLOOD GLUCOSE      POCT Blood Glucose.: 118 mg/dL (17 Dec 2019 19:43)    Meds:   OTHER MEDICATIONS:  benzocaine 15 mG/menthol 3.6 mG (Sugar-Free) Lozenge 1 Lozenge Oral every 6 hours PRN  :    Drug Dosing Weight  Height (cm): 157.5 (15 Dec 2019 22:00)  Weight (kg): 58.1 (15 Dec 2019 22:00)  BMI (kg/m2): 23.4 (15 Dec 2019 22:00)  BSA (m2): 1.58 (15 Dec 2019 22:00)    PAST MEDICAL & SURGICAL HISTORY:  Atrial fibrillation  Hypothyroidism  HTN (hypertension)  HLD (hyperlipidemia)  No significant past surgical history      FAMILY HISTORY:  No pertinent family history in first degree relatives      SOCIAL HISTORY:    ADVANCE DIRECTIVES:    REVIEW OF SYSTEMS:    CONSTITUTIONAL: No fever, weight loss, or fatigue  EYES: No eye pain, visual disturbances, or discharge  ENMT:  No difficulty hearing, tinnitus, vertigo; No sinus or throat pain  NECK: No pain or stiffness  BREASTS: No pain, masses, or nipple discharge  RESPIRATORY: + Shortness of breath; No cough, wheezing, chills or hemoptysis;  CARDIOVASCULAR: No chest pain, palpitations, dizziness, or leg swelling  GASTROINTESTINAL: No abdominal or epigastric pain. No nausea, vomiting, or hematemesis; No diarrhea or constipation. No melena or hematochezia.  GENITOURINARY: No dysuria, frequency, hematuria, or incontinence  NEUROLOGICAL: No headaches, memory loss, loss of strength, numbness, or tremors  SKIN: No itching, burning, rashes, or lesions   MUSCULOSKELETAL: No joint pain or swelling; No muscle, back, or extremity pain  PSYCHIATRIC: No depression, anxiety, mood swings, or difficulty sleeping  HEME/LYMPH: No easy bruising, or bleeding gums  ALLERGY AND IMMUNOLOGIC: No hives or eczema      ICU Vital Signs Last 24 Hrs  T(C): 35.8 (17 Dec 2019 20:00), Max: 36.6 (16 Dec 2019 23:30)  T(F): 96.5 (17 Dec 2019 20:00), Max: 97.8 (16 Dec 2019 23:30)  HR: 103 (17 Dec 2019 22:10) (78 - 112)  BP: 119/79 (17 Dec 2019 22:10) (96/57 - 130/84)  BP(mean): --  ABP: --  ABP(mean): --  RR: 24 (17 Dec 2019 22:10) (16 - 32)  SpO2: 89% (17 Dec 2019 22:10) (85% - 97%)      ABG - ( 17 Dec 2019 19:59 )  pH, Arterial: x     pH, Blood: 7.44  /  pCO2: 41    /  pO2: 69    / HCO3: 28    / Base Excess: 4.0   /  SaO2: 93        I&O's Detail    16 Dec 2019 07:01  -  17 Dec 2019 07:00  --------------------------------------------------------  IN:    Oral Fluid: 220 mL  Total IN: 220 mL    OUT:  Total OUT: 0 mL    Total NET: 220 mL      17 Dec 2019 07:01  -  17 Dec 2019 22:35  --------------------------------------------------------  IN:    Oral Fluid: 120 mL  Total IN: 120 mL    OUT:  Total OUT: 0 mL    Total NET: 120 mL          PHYSICAL EXAM:    GENERAL: slightly tachypneic, using no accessory muscles, speaking in 5-6 word sentences with ventimask.     NECK: Supple, No JVD, Normal thyroid  CHEST/LUNG: Posterior lungs decreased breath sounds up to scapula, anteriorly slight crackles bilaterally   HEART: irregular rate and rhythm;   ABDOMEN: Soft, Nontender, Nondistended; Bowel sounds present  EXTREMITIES:  2+ Peripheral Pulses, No clubbing, cyanosis, or edema  LYMPH: No lymphadenopathy noted  SKIN: No rashes or lesions  NERVOUS SYSTEM:  Alert & Oriented X3, Good concentration; Motor Strength 5/5 B/L upper and lower extremities; DTRs 2+ intact and symmetric    LABS:               11.9   9.93  )-----------( 353      ( 17 Dec 2019 20:09 )             35.8     12-17    131<L>  |  96  |  30<H>  ----------------------------<  120<H>  5.5<H>   |  27  |  1.40<H>    Ca    8.9      17 Dec 2019 20:09  Phos  3.7     12-17  Mg     2.0     12-17    TPro  6.6  /  Alb  2.8<L>  /  TBili  0.5  /  DBili  x   /  AST  29  /  ALT  40  /  AlkPhos  141<H>  12-16    CAPILLARY BLOOD GLUCOSE      POCT Blood Glucose.: 118 mg/dL (17 Dec 2019 19:43)      EKG: afib rate control    RADIOLOGY: bilateral pleural effusion with pulm edema.    < from: CT Angio Chest w/ IV Cont (12.15.19 @ 15:58) >  IMPRESSION:   No pulmonary embolism.  Large bilateral pleural effusions.  Mild pulmonary edema.    MIRANDA WOLFE M.D., ATTENDING RADIOLOGIST  This document has been electronically signed. Dec 15 2019  4:10PM   < end of copied text >

## 2019-12-17 NOTE — PROGRESS NOTE ADULT - SUBJECTIVE AND OBJECTIVE BOX
INTERVAL HPI/OVERNIGHT EVENTS:        REVIEW OF SYSTEMS:  CONSTITUTIONAL: feels  better no  complaints    NECK: No pain or stiffnes  RESPIRATORY: No SOB   CARDIOVASCULAR: No chest pain, palpitations, dizziness,   GASTROINTESTINAL: No abdominal pain. No nausea, vomiting,   NEUROLOGICAL: No headaches, no  blurry  vision no  dizziness  SKIN: No itching,   MUSCULOSKELETAL: No pain    MEDICATION:  amLODIPine   Tablet 5 milliGRAM(s) Oral daily  ATENolol  Tablet 25 milliGRAM(s) Oral every 12 hours  benzocaine 15 mG/menthol 3.6 mG (Sugar-Free) Lozenge 1 Lozenge Oral every 6 hours PRN  furosemide   Injectable 40 milliGRAM(s) IV Push two times a day  heparin  Injectable 5000 Unit(s) SubCutaneous every 12 hours  senna 2 Tablet(s) Oral at bedtime    Vital Signs Last 24 Hrs  T(C): 36.3 (17 Dec 2019 05:08), Max: 36.6 (16 Dec 2019 23:30)  T(F): 97.3 (17 Dec 2019 05:08), Max: 97.8 (16 Dec 2019 23:30)  HR: 78 (17 Dec 2019 05:08) (78 - 109)  BP: 112/65 (17 Dec 2019 05:08) (90/67 - 120/66)  BP(mean): --  RR: 16 (17 Dec 2019 05:08) (16 - 18)  SpO2: 93% (17 Dec 2019 05:08) (92% - 99%)    PHYSICAL EXAM:  GENERAL: NAD, well-groomed, well-developed  EYES:  conjunctiva and sclera clear  ENMT:  Moist mucous membranes,   NECK: Supple, No JVD, Normal thyroid  NERVOUS SYSTEM:  Alert oriented   no  focal  deficits;   CHEST/LUNG: Clear    HEART: Regular rate and rhythm; No murmurs, rubs, or gallops  ABDOMEN: Soft, Nontender, Nondistended; Bowel sounds present  EXTREMITIES:  no  edema no  tenderness  SKIN: No rashes   LABS:                        11.3   7.63  )-----------( 296      ( 17 Dec 2019 07:20 )             33.7     12    132<L>  |  94<L>  |  21  ----------------------------<  99  3.2<L>   |  31  |  0.99    Ca    8.3<L>      17 Dec 2019 07:20  Mg     1.9     12-15    TPro  6.6  /  Alb  2.8<L>  /  TBili  0.5  /  DBili  x   /  AST  29  /  ALT  40  /  AlkPhos  141<H>  12-16    PT/INR - ( 15 Dec 2019 14:35 )   PT: 11.1 sec;   INR: 0.99 ratio         PTT - ( 15 Dec 2019 14:35 )  PTT:32.6 sec  Urinalysis Basic - ( 15 Dec 2019 17:27 )    Color: Yellow / Appearance: Clear / S.015 / pH: x  Gluc: x / Ketone: Negative  / Bili: Negative / Urobili: Negative mg/dL   Blood: x / Protein: 15 mg/dL / Nitrite: Negative   Leuk Esterase: Negative / RBC: x / WBC x   Sq Epi: x / Non Sq Epi: Few / Bacteria: Few      CAPILLARY BLOOD GLUCOSE          RADIOLOGY & ADDITIONAL TESTS:    Imaging reports  Personally Reviewed:  [x ] YES  [ ] NO    Consultant(s) Notes Reviewed:  [ x] YES  [ ] NO    Care Discussed with Consultants/Other Providers [x ] YES  [ ] NO  Assessment and Plan:   Problem/Plan - 1:  ·  Problem: Pulmonary edema.  Plan: lasix  betablocker  tte      Problem/Plan - 2:  ·  Problem: Pleural effusion.  Plan: lasix monitor  labs.     Problem/Plan - 3:  ·  Problem: Hyponatremia.  Plan: fluid  restrict  continue  Na tabs  check urine osmolality  Na  K  Cl.    Problem/Plan - 4:  ·  Problem: Hypothyroidism.  Plan: continue  synthroid chack levels.    Problem/Plan - 5:  ·  Problem: Atrial fibrillation.  Plan: rate  control.     Problem/Plan - 6:  Problem: HLD (hyperlipidemia). Plan: no clinical advantage  to primary  prevention will d/c.

## 2019-12-17 NOTE — CHART NOTE - NSCHARTNOTEFT_GEN_A_CORE
Medicine PA Note    RRT called at 19:40  desaturation on venti mask to 82%    Vitals sign 116/74, 83, 26, 99.6 R 90% on nonrebreather      Patient seen and examined at bedside. Patient using accessory muscles and sob . patient denies palp, cp, fever, chills, nausea, vomting, diarrhea, constipation, dyuria , or abd pain. Patient stated that she was lying in bed when she became sob. Patient is 89 yr old femle with GI stromal tumor s/p partial gastrectomy with ? 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 Patient was admitted 12/15 for Hyponatremia , b/l pleural effusions and pulmonary edema. Patient received today lasix 40 mg ivp bid and fluid restriction.     Gen patient is alert, awake , slightly tachypneic, using accessory muscles.  Head At/nc  Neck no JVD   Chest/Lungs decreased lungs b/l at bases to mid scapula  CV a fib on monitor irreg reg  abd soft nontender nondistended BS +2   ext no calf tenderness no edema     a/p  89 yr old femle with GI stromal tumor s/p partial gastrectomy with ? 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 Patient was admitted 12/15 for Hyponatremia , b/l pleural effusions and pulmonary edema CTA angio noted  no PE , large b/l pleural effusions and mild pulm edema.  Will obtain cxr   will obtain abg  will obtain cbc, bmp, phos , mag  patient on nonrebreather po93%    Dr Orourke at bedside during RRT.  MBsandra [facility]    Addendum                        11.9   9.93  )-----------( 353      ( 17 Dec 2019 20:09 )                                                                        cxr similar to previous cxr on 12/15               35.8     12-17                                                                                                                                               ABG - ( 17 Dec 2019 19:59 )  pH, Arterial: x     pH, Blood: 7.44  /  pCO2: 41    /  pO2: 69    / HCO3: 28    / Base Excess: 4.0   /  SaO2: 93                  131<L>  |  96  |  30<H>  ----------------------------<  120<H>  5.5<H>   |  27  |  1.40<H>    Ca    8.9      17 Dec 2019 20:09  Phos  3.7     12-17  Mg     2.0     12-17    TPro  6.6  /  Alb  2.8<L>  /  TBili  0.5  /  DBili  x   /  AST  29  /  ALT  40  /  AlkPhos  141<H>  12-16      PMD dr Klein made aware of RRT and requested ICu consult and d dimer and ekg  EKG similar to previous EKG   D dimer 669 elevated bun/creat will get vq scan in am if stable  appreciated ICu consult  will continue to monitor the patient  Lester Landis

## 2019-12-17 NOTE — PROGRESS NOTE ADULT - SUBJECTIVE AND OBJECTIVE BOX
[facility] NEPHROLOGY SERVICES, [facility]  NEPHROLOGY AND HYPERTENSION  [address]  [address]  [place] [address]  [phone]    MD GUY ARECHIGA, MD LOUIS GARCIA, MD SHERRY YEN, MD WOLF HERNANDEZ, MD RESHMA DAS MD          Patient feels well no complaints today.    MEDICATIONS  (STANDING):  amLODIPine   Tablet 5 milliGRAM(s) Oral daily  ATENolol  Tablet 25 milliGRAM(s) Oral every 12 hours  furosemide   Injectable 40 milliGRAM(s) IV Push two times a day  heparin  Injectable 5000 Unit(s) SubCutaneous every 12 hours  potassium chloride    Tablet ER 40 milliEquivalent(s) Oral every 4 hours  senna 2 Tablet(s) Oral at bedtime    MEDICATIONS  (PRN):  benzocaine 15 mG/menthol 3.6 mG (Sugar-Free) Lozenge 1 Lozenge Oral every 6 hours PRN Sore Throat      12-16-19 @ 07:01  -  12-17-19 @ 07:00  --------------------------------------------------------  IN: 220 mL / OUT: 0 mL / NET: 220 mL      PHYSICAL EXAM:      T(C): 35.9 (12-17-19 @ 16:41), Max: 36.6 (12-16-19 @ 23:30)  HR: 103 (12-17-19 @ 16:41) (78 - 104)  BP: 112/73 (12-17-19 @ 16:41) (96/57 - 120/66)  RR: 17 (12-17-19 @ 16:41) (16 - 18)  SpO2: 91% (12-17-19 @ 16:41) (91% - 97%)  Wt(kg): --  Respiratory: clear anteriorly, decreased BS at bases  Cardiovascular: S1 S2  Gastrointestinal: soft NT ND +BS  Extremities:   1 edema                                    11.3   7.63  )-----------( 296      ( 17 Dec 2019 07:20 )             33.7     12-17    132<L>  |  94<L>  |  21  ----------------------------<  99  3.2<L>   |  31  |  0.99    Ca    8.3<L>      17 Dec 2019 07:20    TPro  6.6  /  Alb  2.8<L>  /  TBili  0.5  /  DBili  x   /  AST  29  /  ALT  40  /  AlkPhos  141<H>  12-16      LIVER FUNCTIONS - ( 16 Dec 2019 08:17 )  Alb: 2.8 g/dL / Pro: 6.6 gm/dL / ALK PHOS: 141 U/L / ALT: 40 U/L / AST: 29 U/L / GGT: x           Creatinine Trend: 0.99<--, 0.87<--, 1.02<--      Assessment   Hypervolemic hyponatremia, improving;   Hypokalemia    Plan:  Continued loop diuretic  PO kCL;   Will follow.    Db Huang MD

## 2019-12-17 NOTE — PROGRESS NOTE ADULT - SUBJECTIVE AND OBJECTIVE BOX
89y Female complaining of difficulty breathing.  · HPI Objective Statement: 89 yo female from home with pmh GI stromal tumor s/p partial gastrectomy with ?ppx IVC filter, afib not on AC, CKD Stage 3, HTN, HLD, hypothyroid, chronic hyponatremia on Na 1g BID, h/o SAH/IVH  presents with sob at night for past few days but worse today. + b/l LE edema x 1 week.  family also noted pt a little more confused in past 4 days. Pt had routine PMD visit and noted hyponatremia and was awaiting fu tomorrow. pt is AOX3, c/o tachypnea, but denies headache, dizziness, cp, abd pain. Pt c/o b/l calf pain. pt normally ambulates with cane up until last week walking with walker.    	ROS: No fever/chills. No photophobia/eye pain/changes in vision, No ear pain/sore throat/dysphagia, No chest pain/palpitations. +SOB, no cough. No abdominal pain, No N/V/D, no black/bloody bm. No dysuria/frequency/discharge, No headache. No Dizziness.  No rash.  No numbness/tingling/  +weakness +  decreased  hearing (15 Dec 2019 16:54)      Chief Complaint:  Patient is a 89y old  Female who presents with a chief complaint of sob  ryder  hyponatremia pulmonary  edema (16 Dec 2019 17:14)      Review of Systems:    General:  No wt loss, fevers, chills, night sweats  Eyes:  Good vision, no reported pain  ENT:  No sore throat, pain, runny nose, dysphagia  CV:   palpitations,   Resp:   dyspnea,   GI:  No pain, nausea, vomiting, diarrhea, constipation           Social History/Family History  SOCHX:   tobacco,  -  alcohol    FMHX: FA/MO  - contributory       Discussed with:  PMD, Family    Physical Exam:    Vital Signs:  Vital Signs Last 24 Hrs  T(C): 36.3 (17 Dec 2019 05:08), Max: 36.6 (16 Dec 2019 23:30)  T(F): 97.3 (17 Dec 2019 05:08), Max: 97.8 (16 Dec 2019 23:30)  HR: 78 (17 Dec 2019 05:08) (78 - 109)  BP: 112/65 (17 Dec 2019 05:08) (90/67 - 120/66)  BP(mean): --  RR: 16 (17 Dec 2019 05:08) (16 - 18)  SpO2: 93% (17 Dec 2019 05:08) (92% - 99%)  Daily     Daily Weight in k.4 (17 Dec 2019 05:08)  I&O's Summary    16 Dec 2019 07:01  -  17 Dec 2019 07:00  --------------------------------------------------------  IN: 220 mL / OUT: 0 mL / NET: 220 mL        Chest:  decreased at bases  Cardiovascular:  irRegular rhythm, S1, S2, no murmur/rub/S3/S4, no carotid/femoral/abdominal bruit, radial/pedal pulses 2+,  edema  Abdomen:  Soft, non-tender, non-distended, normoactive bowel sounds, no HSM      Laboratory:                          11.8   8.07  )-----------( 319      ( 16 Dec 2019 08:17 )             34.8     12-16    129<L>  |  94<L>  |  19  ----------------------------<  99  3.5   |  27  |  0.87    Ca    8.7      16 Dec 2019 08:17  Mg     1.9     12-15    TPro  6.6  /  Alb  2.8<L>  /  TBili  0.5  /  DBili  x   /  AST  29  /  ALT  40  /  AlkPhos  141<H>  12-16    ABG - ( 15 Dec 2019 15:30 )  pH, Arterial: x     pH, Blood: 7.40  /  pCO2: 34    /  pO2: 72    / HCO3: 21    / Base Excess: -3.0  /  SaO2: 94                CARDIAC MARKERS ( 15 Dec 2019 14:35 )  <.015 ng/mL / x     / x     / x     / x          CAPILLARY BLOOD GLUCOSE        LIVER FUNCTIONS - ( 16 Dec 2019 08:17 )  Alb: 2.8 g/dL / Pro: 6.6 gm/dL / ALK PHOS: 141 U/L / ALT: 40 U/L / AST: 29 U/L / GGT: x           PT/INR - ( 15 Dec 2019 14:35 )   PT: 11.1 sec;   INR: 0.99 ratio         PTT - ( 15 Dec 2019 14:35 )  PTT:32.6 sec  Urinalysis Basic - ( 15 Dec 2019 17:27 )    Color: Yellow / Appearance: Clear / S.015 / pH: x  Gluc: x / Ketone: Negative  / Bili: Negative / Urobili: Negative mg/dL   Blood: x / Protein: 15 mg/dL / Nitrite: Negative   Leuk Esterase: Negative / RBC: x / WBC x   Sq Epi: x / Non Sq Epi: Few / Bacteria: Few      Assessment:  I am asked for consultation on this patient with edema and pleural effusions  The BNP is approximately in the 6000 range, cardiac enzymes remain normal  The EKG reveals atrial fibrillation  Lasix IV push is initiated and will continue  nephrology consultation and noted, Patient's sodium is slowly improving  heart rate and systolic blood pressure remained stable  critical care consultation is noted as the patient's shortness of breath is not improving as quickly as anticipated, patient will remain on the medical  floor with continued IV Lasix

## 2019-12-17 NOTE — PROGRESS NOTE ADULT - SUBJECTIVE AND OBJECTIVE BOX
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 daughter at bedside (lives with patient who reports gradual weakness and increased sob as well as le edema.   yesterday , she ambulated with PT with a CG level with oxygen. "it was great. i want to walk more today" denies sob with ambulation  denies pain   daughter and patient asking about going home      ROS: No fever/chills. No photophobia/eye pain/changes in vision, No ear pain/sore throat/dysphagia, No chest pain/palpitations. no cough. No abdominal pain, No N/V/D,  No dysuria/frequency/discharge, - reports mild urinary retention (chronic) ,  No headache. No Dizziness.  No rash.  No numbness/tingling/  +weakness +  decreased  hearing   reports s/p bilateral cataract sx with great results - uses glasses for reading only , reports no sob today or with ambulation with oxygen - not on home oxygen   no depression         PAST MEDICAL & SURGICAL HISTORY  Atrial fibrillation  Hypothyroidism  HTN (hypertension)  HLD (hyperlipidemia)  SAH/IVH 8/19   No significant past surgical history            PHYSICAL EXAM  Constitutional - NAD, Comfortable in w/c with oxygen   good sitting balance   HEENT - NCAT, EOMI  Neck - Supple, functional ROM  Cardiovascular irregular hr  Abdomen - Soft, Not tender  Extremities - bilateral le - minimal  distal edema - soft, NT , able to straight leg raise bilaterally without pain  bilateral ue - , functional rom , no focal motor deficits   Neurologic -                    Cognitive - Awake, Alert, Oriented times 3      Speech Intact - no dysarthria      Language  Intact - no word finding difficulties      Motor - No focal deficits     Sensory - Intact to LT, proprioception intact bilateral feet      Psychiatric - Mood stable, Affect WNL      A/P 89 year old previously ambulating with RW/SAC and independent with basic adls - now with deconditioning s/p hospitalization for hyponatremia, SOB, pleural effusions. patient had been functionally declining in the week prior to admission further limiting her ambulation. she is happy with her progress and she and her daughter feel she can continue her PT at home.      i d/w dr clayton and agree with d/c home with 24/7 supervision and home PT once medically stable  daughter in agreement

## 2019-12-18 ENCOUNTER — RESULT REVIEW (OUTPATIENT)
Age: 84
End: 2019-12-18

## 2019-12-18 DIAGNOSIS — R53.81 OTHER MALAISE: ICD-10-CM

## 2019-12-18 DIAGNOSIS — J81.0 ACUTE PULMONARY EDEMA: ICD-10-CM

## 2019-12-18 DIAGNOSIS — R06.02 SHORTNESS OF BREATH: ICD-10-CM

## 2019-12-18 DIAGNOSIS — I48.20 CHRONIC ATRIAL FIBRILLATION, UNSPECIFIED: ICD-10-CM

## 2019-12-18 DIAGNOSIS — Z51.5 ENCOUNTER FOR PALLIATIVE CARE: ICD-10-CM

## 2019-12-18 LAB
ANION GAP SERPL CALC-SCNC: 8 MMOL/L — SIGNIFICANT CHANGE UP (ref 5–17)
BASE EXCESS BLDA CALC-SCNC: 6 MMOL/L — HIGH (ref -2–2)
BASE EXCESS BLDA CALC-SCNC: 7.7 MMOL/L — HIGH (ref -2–2)
BLOOD GAS COMMENTS: SIGNIFICANT CHANGE UP
BLOOD GAS SOURCE: SIGNIFICANT CHANGE UP
BLOOD GAS SOURCE: SIGNIFICANT CHANGE UP
BUN SERPL-MCNC: 27 MG/DL — HIGH (ref 7–23)
CALCIUM SERPL-MCNC: 9.1 MG/DL — SIGNIFICANT CHANGE UP (ref 8.5–10.1)
CHLORIDE SERPL-SCNC: 95 MMOL/L — LOW (ref 96–108)
CO2 SERPL-SCNC: 30 MMOL/L — SIGNIFICANT CHANGE UP (ref 22–31)
CREAT SERPL-MCNC: 1.15 MG/DL — SIGNIFICANT CHANGE UP (ref 0.5–1.3)
GLUCOSE SERPL-MCNC: 91 MG/DL — SIGNIFICANT CHANGE UP (ref 70–99)
GRAM STN FLD: SIGNIFICANT CHANGE UP
HCO3 BLDA-SCNC: 30 MMOL/L — HIGH (ref 21–29)
HCO3 BLDA-SCNC: 32 MMOL/L — HIGH (ref 21–29)
HCT VFR BLD CALC: 38.2 % — SIGNIFICANT CHANGE UP (ref 34.5–45)
HGB BLD-MCNC: 12.7 G/DL — SIGNIFICANT CHANGE UP (ref 11.5–15.5)
HOROWITZ INDEX BLDA+IHG-RTO: 0.5 — SIGNIFICANT CHANGE UP
HOROWITZ INDEX BLDA+IHG-RTO: SIGNIFICANT CHANGE UP
LDH SERPL L TO P-CCNC: 256 U/L — HIGH (ref 50–242)
MCHC RBC-ENTMCNC: 28.6 PG — SIGNIFICANT CHANGE UP (ref 27–34)
MCHC RBC-ENTMCNC: 33.2 GM/DL — SIGNIFICANT CHANGE UP (ref 32–36)
MCV RBC AUTO: 86 FL — SIGNIFICANT CHANGE UP (ref 80–100)
NRBC # BLD: 0 /100 WBCS — SIGNIFICANT CHANGE UP (ref 0–0)
PCO2 BLDA: 43 MMHG — SIGNIFICANT CHANGE UP (ref 32–46)
PCO2 BLDA: 43 MMHG — SIGNIFICANT CHANGE UP (ref 32–46)
PH BLD: 7.46 — HIGH (ref 7.35–7.45)
PH BLD: 7.48 — HIGH (ref 7.35–7.45)
PH FLD: 7.5 — SIGNIFICANT CHANGE UP
PLATELET # BLD AUTO: 308 K/UL — SIGNIFICANT CHANGE UP (ref 150–400)
PO2 BLDA: 59 MMHG — LOW (ref 74–108)
PO2 BLDA: 84 MMHG — SIGNIFICANT CHANGE UP (ref 74–108)
POTASSIUM SERPL-MCNC: 4.1 MMOL/L — SIGNIFICANT CHANGE UP (ref 3.5–5.3)
POTASSIUM SERPL-SCNC: 4.1 MMOL/L — SIGNIFICANT CHANGE UP (ref 3.5–5.3)
PROT SERPL-MCNC: 6.8 GM/DL — SIGNIFICANT CHANGE UP (ref 6–8.3)
RBC # BLD: 4.44 M/UL — SIGNIFICANT CHANGE UP (ref 3.8–5.2)
RBC # FLD: 15.3 % — HIGH (ref 10.3–14.5)
SAO2 % BLDA: 90 % — LOW (ref 92–96)
SAO2 % BLDA: 96 % — SIGNIFICANT CHANGE UP (ref 92–96)
SODIUM SERPL-SCNC: 133 MMOL/L — LOW (ref 135–145)
SPECIMEN SOURCE FLD: SIGNIFICANT CHANGE UP
SPECIMEN SOURCE: SIGNIFICANT CHANGE UP
WBC # BLD: 8.04 K/UL — SIGNIFICANT CHANGE UP (ref 3.8–10.5)
WBC # FLD AUTO: 8.04 K/UL — SIGNIFICANT CHANGE UP (ref 3.8–10.5)

## 2019-12-18 PROCEDURE — 99223 1ST HOSP IP/OBS HIGH 75: CPT

## 2019-12-18 PROCEDURE — 88112 CYTOPATH CELL ENHANCE TECH: CPT | Mod: 26

## 2019-12-18 PROCEDURE — 88305 TISSUE EXAM BY PATHOLOGIST: CPT | Mod: 26

## 2019-12-18 PROCEDURE — 99497 ADVNCD CARE PLAN 30 MIN: CPT | Mod: 25

## 2019-12-18 PROCEDURE — 71045 X-RAY EXAM CHEST 1 VIEW: CPT | Mod: 26

## 2019-12-18 PROCEDURE — 32555 ASPIRATE PLEURA W/ IMAGING: CPT | Mod: RT

## 2019-12-18 RX ADMIN — Medication 40 MILLIGRAM(S): at 06:02

## 2019-12-18 RX ADMIN — HEPARIN SODIUM 5000 UNIT(S): 5000 INJECTION INTRAVENOUS; SUBCUTANEOUS at 06:03

## 2019-12-18 RX ADMIN — SENNA PLUS 2 TABLET(S): 8.6 TABLET ORAL at 22:15

## 2019-12-18 RX ADMIN — AMLODIPINE BESYLATE 5 MILLIGRAM(S): 2.5 TABLET ORAL at 06:02

## 2019-12-18 RX ADMIN — ATENOLOL 25 MILLIGRAM(S): 25 TABLET ORAL at 06:48

## 2019-12-18 RX ADMIN — Medication 40 MILLIGRAM(S): at 17:37

## 2019-12-18 NOTE — CONSULT NOTE ADULT - SUBJECTIVE AND OBJECTIVE BOX
Patient is a 89y old  Female who presents with a chief complaint of sob  ryder  hyponatremia pulmonary  edema (18 Dec 2019 13:31)  IR consulted for right thoracentesis.  Pt on supplemental oxygen for SOB.  Also afib not on AC    CT Angio Chest w/ IV Cont (12.15.19 @ 15:58) >  No pulmonary embolism.  Large bilateral pleural effusions.  Mild pulmonary edema.      HPI:  · Chief Complaint: The patient is a 89y Female complaining of difficulty breathing.  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 + b/l LE edema x 1 week.  family also noted pt a little more confused in past 4 days. Pt had routine PMD visit and noted hyponatremia and was awaiting fu tomorrow. pt is AOX3, c/o tachypnea, but denies headache, dizziness, cp, abd pain. Pt c/o b/l calf pain. pt normally ambulates with cane up until last week walking with walker.    ROS: No fever/chills. No photophobia/eye pain/changes in vision, No ear pain/sore throat/dysphagia, No chest pain/palpitations. +SOB, no cough. No abdominal pain, No N/V/D, no black/bloody bm. No dysuria/frequency/discharge, No headache. No Dizziness.  No rash.  No numbness/tingling/  +weakness +  decreased  hearing (15 Dec 2019 16:54)        PAST MEDICAL & SURGICAL HISTORY:  Atrial fibrillation  Hypothyroidism  HTN (hypertension)  HLD (hyperlipidemia)  No significant past surgical history      Allergies    No Known Allergies    Intolerances        MEDICATIONS  (STANDING):  amLODIPine   Tablet 5 milliGRAM(s) Oral daily  ATENolol  Tablet 25 milliGRAM(s) Oral every 12 hours  furosemide   Injectable 40 milliGRAM(s) IV Push two times a day  heparin  Injectable 5000 Unit(s) SubCutaneous every 12 hours  senna 2 Tablet(s) Oral at bedtime    MEDICATIONS  (PRN):  benzocaine 15 mG/menthol 3.6 mG (Sugar-Free) Lozenge 1 Lozenge Oral every 6 hours PRN Sore Throat        SOCIAL HISTORY:    FAMILY HISTORY:  No pertinent family history in first degree relatives        PHYSICAL EXAM:    Vital Signs Last 24 Hrs  T(C): 35.9 (18 Dec 2019 11:50), Max: 35.9 (17 Dec 2019 16:41)  T(F): 96.6 (18 Dec 2019 11:50), Max: 96.7 (17 Dec 2019 23:23)  HR: 87 (18 Dec 2019 11:50) (72 - 112)  BP: 111/73 (18 Dec 2019 11:50) (107/67 - 134/82)  BP(mean): --  RR: 19 (18 Dec 2019 11:50) (17 - 32)  SpO2: 98% (18 Dec 2019 11:50) (85% - 99%)    General:  Appears stated age, well-groomed, well-nourished, no distress  Lungs:  decreased bs b/l  Cardiovascular:  good S1, S2,   Abdomen:  Soft, non-tender, non-distended,   Extremities:  no calf tenderness/swelling b/l  Neuro/Psych:  A &O x 3    LABS:                        12.7   8.04  )-----------( 308      ( 18 Dec 2019 07:54 )             38.2     12-18    133<L>  |  95<L>  |  27<H>  ----------------------------<  91  4.1   |  30  |  1.15    Ca    9.1      18 Dec 2019 07:54  Phos  3.7     12-17  Mg     2.0     12-17    TPro  6.8  /  Alb  x   /  TBili  x   /  DBili  x   /  AST  x   /  ALT  x   /  AlkPhos  x   12-18          RADIOLOGY & ADDITIONAL STUDIES:

## 2019-12-18 NOTE — CONSULT NOTE ADULT - CONSULT REQUESTED BY NAME
DR. Klein
Dr. Klein
PCP
[facility]
dr clayton
[facility]
pulmonology
Follow up with PMD in 1 week.   Follow up with Cardio in 2 weeks.

## 2019-12-18 NOTE — PROGRESS NOTE ADULT - SUBJECTIVE AND OBJECTIVE BOX
[facility], [facility]  NEPHROLOGY AND HYPERTENSION  [address]  [address]  [place] [address]  [phone]    MD GUY ARECHIGA, MD LOUIS GARCIA, MD SHERRY YEN, MD WOLF HERNANDEZ, MD RESHMA DAS MD          Patient on NRB now;   No distress  For Thoracentesis    MEDICATIONS  (STANDING):  amLODIPine   Tablet 5 milliGRAM(s) Oral daily  ATENolol  Tablet 25 milliGRAM(s) Oral every 12 hours  furosemide   Injectable 40 milliGRAM(s) IV Push two times a day  heparin  Injectable 5000 Unit(s) SubCutaneous every 12 hours  senna 2 Tablet(s) Oral at bedtime    MEDICATIONS  (PRN):  benzocaine 15 mG/menthol 3.6 mG (Sugar-Free) Lozenge 1 Lozenge Oral every 6 hours PRN Sore Throat      12-17-19 @ 07:01  -  12-18-19 @ 07:00  --------------------------------------------------------  IN: 230 mL / OUT: 1450 mL / NET: -1220 mL      PHYSICAL EXAM:      T(C): 36.1 (12-18-19 @ 16:36), Max: 36.1 (12-18-19 @ 16:36)  HR: 101 (12-18-19 @ 16:36) (72 - 112)  BP: 101/59 (12-18-19 @ 16:36) (96/54 - 134/82)  RR: 18 (12-18-19 @ 16:36) (16 - 32)  SpO2: 100% (12-18-19 @ 16:36) (85% - 100%)  Wt(kg): --  Respiratory: mild rhonchi anteriorly, decreased BS at bases R>L  Cardiovascular: S1 S2  Gastrointestinal: soft NT ND +BS  Extremities:   1 edema                                    12.7   8.04  )-----------( 308      ( 18 Dec 2019 07:54 )             38.2     12-18    133<L>  |  95<L>  |  27<H>  ----------------------------<  91  4.1   |  30  |  1.15    Ca    9.1      18 Dec 2019 07:54  Phos  3.7     12-17  Mg     2.0     12-17    TPro  6.8  /  Alb  x   /  TBili  x   /  DBili  x   /  AST  x   /  ALT  x   /  AlkPhos  x   12-18    ABG - ( 18 Dec 2019 08:17 )  pH, Arterial: x     pH, Blood: 7.48  /  pCO2: 43    /  pO2: 84    / HCO3: 32    / Base Excess: 7.7   /  SaO2: 96                LIVER FUNCTIONS - ( 18 Dec 2019 12:27 )  Alb: x     / Pro: 6.8 gm/dL / ALK PHOS: x     / ALT: x     / AST: x     / GGT: x           Creatinine Trend: 1.15<--, 1.40<--, 0.99<--, 0.87<--, 1.02<--      Assessment   Hypervolemic hyponatremia, improving;       Plan:  Continued loop diuretic IV; urine output adequate.  Will follow.          Db Huang MD

## 2019-12-18 NOTE — PROGRESS NOTE ADULT - SUBJECTIVE AND OBJECTIVE BOX
Patient is a 89y old  Female who presented to [facility] on 12/15/19  with a chief complaint of sob  ryder.     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 daughter at bedside (lives with patient who reports gradual weakness and increased sob as well as le edema.   yesterday , she ambulated with PT with a CG level with oxygen and daughter and patient requested to be d/c home after hospital d/c  last night there was a change in status and had episode of sob with desaturation and tachypnea. a RRT was called - patient now on bipap. daughter at bedside now concerned about taking her home. patient reports feeling more comfortable this morning on bipap and less sob . no patient  she is now anxious about ambulating       ROS: No fever/chills. no n/v/. comfortable with oxygen in place. appetite intact         PAST MEDICAL & SURGICAL HISTORY      PHYSICAL EXAM  Constitutional - NAD, Comfortable in bed on bipap  HEENT - NCAT, EOMI  Neck - Supple, functional ROM  Cardiovascular irregular hr  Abdomen - Soft, Not tender  Extremities - bilateral le - minimal  distal edema - soft, NT , able to straight leg raise bilaterally without pain  bilateral ue - , functional rom , no focal motor deficits   Neurologic -                    Cognitive - Awake, Alert, Oriented times 3      Speech Intact - no dysarthria      Language  Intact - no word finding difficulties      Motor - No focal deficits          A/P 89 year old previously ambulating with RW/SAC and independent with basic adls - now with deconditioning s/p hospitalization for hyponatremia, SOB, pleural effusions. she and her daughter were hoping to be d/c from hospital and then home with home PT. there has however been a change in status. currently pending VQ scan, echo , currently on bipap.      i d/w the patient and the daughter that i will continue to monitor patient and functional status and respiratory status and plan will depend on her status. will continue to monitor. emotional support provided

## 2019-12-18 NOTE — CONSULT NOTE ADULT - PROBLEM SELECTOR RECOMMENDATION 7
met with son and daughter outside room initially (pt using commode), discussed their understanding of what pt's condition was and what her living will stated. Explained that her documents (copies in chart) stated she did not want heroic measures taken that would serve only to prolong her dying process, and directs that they be with held or withdrawn if pt is terminal. Explored the recent decline in patient's functional state with them and their understanding of heroic measures- discussed CPR, use of mechanical ventilation with them. They expressed concern that their mother did not fully understand these terms and requested we speak to her - pt appears alert and oriented, discussed use of medical interventions for her needs currently as well as the possibility of intubation/vent support, need for CPR and what that could look like for her in her medical state. After this explanation, pt declared that she would not want CPR or to be on machines but wants to continue to be treated currently and is agreeable to using BIPAP if needed.

## 2019-12-18 NOTE — PROGRESS NOTE ADULT - SUBJECTIVE AND OBJECTIVE BOX
89y Female complaining of difficulty breathing.  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 + b/l LE edema x 1 week.  family also noted pt a little more confused in past 4 days. Pt had routine PMD visit and noted hyponatremia and was awaiting fu tomorrow. pt is AOX3, c/o tachypnea, but denies headache, dizziness, cp, abd pain. Pt c/o b/l calf pain. pt normally ambulates with cane up until last week walking with walker.    	ROS: No fever/chills. No photophobia/eye pain/changes in vision, No ear pain/sore throat/dysphagia, No chest pain/palpitations. +SOB, no cough. No abdominal pain, No N/V/D, no black/bloody bm. No dysuria/frequency/discharge, No headache. No Dizziness.  No rash.  No numbness/tingling/  +weakness +  decreased  hearing (15 Dec 2019 16:54)      Chief Complaint:  Patient is a 89y old  Female who presents with a chief complaint of sob  ryder  hyponatremia pulmonary  edema (16 Dec 2019 17:14)      Review of Systems:    General:  No wt loss, fevers, chills, night sweats  Eyes:  Good vision, no reported pain  ENT:  No sore throat, pain, runny nose, dysphagia  CV:   palpitations,   Resp:   dyspnea,   GI:  No pain, nausea, vomiting, diarrhea, constipation           Social History/Family History  SOCHX:   tobacco,  -  alcohol    FMHX: FA/MO  - contributory       Discussed with:  PMD, Family    Physical Exam:    Vital Signs:  Vital Signs Last 24 Hrs  T(C): 36.3 (17 Dec 2019 05:08), Max: 36.6 (16 Dec 2019 23:30)  T(F): 97.3 (17 Dec 2019 05:08), Max: 97.8 (16 Dec 2019 23:30)  HR: 78 (17 Dec 2019 05:08) (78 - 109)  BP: 112/65 (17 Dec 2019 05:08) (90/67 - 120/66)  BP(mean): --  RR: 16 (17 Dec 2019 05:08) (16 - 18)  SpO2: 93% (17 Dec 2019 05:08) (92% - 99%)  Daily     Daily Weight in k.4 (17 Dec 2019 05:08)  I&O's Summary    16 Dec 2019 07:01  -  17 Dec 2019 07:00  --------------------------------------------------------  IN: 220 mL / OUT: 0 mL / NET: 220 mL        Chest:  decreased at bases  Cardiovascular:  irRegular rhythm, S1, S2, no murmur/rub/S3/S4, no carotid/femoral/abdominal bruit, radial/pedal pulses 2+,  edema  Abdomen:  Soft, non-tender, non-distended, normoactive bowel sounds, no HSM      Laboratory:                          11.8   8.07  )-----------( 319      ( 16 Dec 2019 08:17 )             34.8     12-16    129<L>  |  94<L>  |  19  ----------------------------<  99  3.5   |  27  |  0.87    Ca    8.7      16 Dec 2019 08:17  Mg     1.9     12-15    TPro  6.6  /  Alb  2.8<L>  /  TBili  0.5  /  DBili  x   /  AST  29  /  ALT  40  /  AlkPhos  141<H>  12-16    ABG - ( 15 Dec 2019 15:30 )  pH, Arterial: x     pH, Blood: 7.40  /  pCO2: 34    /  pO2: 72    / HCO3: 21    / Base Excess: -3.0  /  SaO2: 94                CARDIAC MARKERS ( 15 Dec 2019 14:35 )  <.015 ng/mL / x     / x     / x     / x          CAPILLARY BLOOD GLUCOSE        LIVER FUNCTIONS - ( 16 Dec 2019 08:17 )  Alb: 2.8 g/dL / Pro: 6.6 gm/dL / ALK PHOS: 141 U/L / ALT: 40 U/L / AST: 29 U/L / GGT: x           PT/INR - ( 15 Dec 2019 14:35 )   PT: 11.1 sec;   INR: 0.99 ratio         PTT - ( 15 Dec 2019 14:35 )  PTT:32.6 sec  Urinalysis Basic - ( 15 Dec 2019 17:27 )    Color: Yellow / Appearance: Clear / S.015 / pH: x  Gluc: x / Ketone: Negative  / Bili: Negative / Urobili: Negative mg/dL   Blood: x / Protein: 15 mg/dL / Nitrite: Negative   Leuk Esterase: Negative / RBC: x / WBC x   Sq Epi: x / Non Sq Epi: Few / Bacteria: Few      Assessment:  I am asked for consultation on this patient with edema and pleural effusions  The BNP is approximately in the 6000 range, cardiac enzymes remain normal  The EKG reveals atrial fibrillation  Lasix IV push is initiated and will continue  nephrology consultation and noted, Patient's sodium is slowly improving  heart rate and systolic blood pressure remained stable  IV Lasix

## 2019-12-18 NOTE — CONSULT NOTE ADULT - REASON FOR ADMISSION
sob  ryder  hyponatremia pulmonary  edema
sob
sob  ryder  hyponatremia pulmonary  edema

## 2019-12-18 NOTE — CONSULT NOTE ADULT - ASSESSMENT
89y/o from home with PMH AF, GI Stromal tumor, CKD 3, Hyponatremia, HTN, HLD, ICH in Aug 2019 now admitted with difficulty breathing/leg edema found to have pulm edema and pleural effusions, placed on BIPAP last night for hypoxia/work of breathing.

## 2019-12-18 NOTE — CONSULT NOTE ADULT - PROBLEM SELECTOR RECOMMENDATION 9
-procedure to be performed today  -meds, labs and vitals reviewed  -Case d/w patient and son, consent obtained
pt alternating between NRB and BIPAP- consider trial of high flow  on lasix  consider thoracentesis for drainage of pleural effusions  can use low dose opioids for dyspnea as well

## 2019-12-18 NOTE — CONSULT NOTE ADULT - CONSULT REASON
Diff breathing
Hypoxia
deconditioning s/p pulmonary edema and hyponatrmia
edema
hyponatremia
hypoxia
right thoracentesis

## 2019-12-18 NOTE — GOALS OF CARE CONVERSATION - ADVANCED CARE PLANNING - CONVERSATION DETAILS
89 year old with multiple medical problems including HTN, AF and CKD admitted with resp distress, placed on BIPAP overnight due to work of breathing and hypoxia. Pt has HCP and living will in chart. Reviewed documents with pt and son and daughter who are at bedside. After clarifying the use of mechanical ventilation, possible trajectories of treatment with vent support and indications and outcomes of CPR with comparison to pt's living will document, pt herself decided that she would not want to be on a machine and does not want to have CPR performed after understanding outcome may be quite poor if she were to under go it. Son and daughter present for discussion.

## 2019-12-18 NOTE — CONSULT NOTE ADULT - SUBJECTIVE AND OBJECTIVE BOX
HPI:  · Chief Complaint: The patient is a 89y Female complaining of difficulty breathing.  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 + b/l LE edema x 1 week.  family also noted pt a little more confused in past 4 days. Pt had routine PMD visit and noted hyponatremia and was awaiting fu tomorrow. pt is AOX3, c/o tachypnea, but denies headache, dizziness, cp, abd pain. Pt c/o b/l calf pain. pt normally ambulates with cane up until last week walking with walker.    	ROS: No fever/chills. No photophobia/eye pain/changes in vision, No ear pain/sore throat/dysphagia, No chest pain/palpitations. +SOB, no cough. No abdominal pain, No N/V/D, no black/bloody bm. No dysuria/frequency/discharge, No headache. No Dizziness.  No rash.  No numbness/tingling/  +weakness +  decreased  hearing (15 Dec 2019 16:54)    PERTINENT PM/SXH:   Atrial fibrillation  Hypothyroidism  HTN (hypertension)  HLD (hyperlipidemia)    No significant past surgical history    FAMILY HISTORY:  No pertinent family history in first degree relatives      SOCIAL HISTORY:   Significant other/partner: Yes [ ]  No [ ] Children:  Yes [ ]  No [ ] Sabianism/Spirituality:  Substance hx: Yes[ ]  No [ ]   Tobacco hx:  Yes [ ] No [ ]   Alcohol hx: Yes [ ] No [ ]   Home Opioid hx:  Yes [ ] No [ ]  [ ] I-Stop Reference No:  Living Situation: [ ]Home  [ ]Long term care  [ ]Rehab [ ]Other    ADVANCE DIRECTIVES:    DNR  Yes  MOLST  Yes [ ] No [ ]  Living Will  Yes [ ]  No [ ]     [ ] Health Care Proxy(s)  [ ] Surrogate(s)  [ ] Guardian           Name(s): Phone Number(s):    BASELINE (I)ADL(s) (prior to admission):  [place]: [ ]Total  [ ] Moderate [ ]Dependent    Allergies    No Known Allergies    Intolerances    MEDICATIONS  (STANDING):  amLODIPine   Tablet 5 milliGRAM(s) Oral daily  ATENolol  Tablet 25 milliGRAM(s) Oral every 12 hours  furosemide   Injectable 40 milliGRAM(s) IV Push two times a day  heparin  Injectable 5000 Unit(s) SubCutaneous every 12 hours  senna 2 Tablet(s) Oral at bedtime    MEDICATIONS  (PRN):  benzocaine 15 mG/menthol 3.6 mG (Sugar-Free) Lozenge 1 Lozenge Oral every 6 hours PRN Sore Throat    PRESENT SYMPTOMS: [ ]Unable to obtain due to poor mentation   Source if other than patient:  [ ]Family   [ ]Team     Pain (Impact on QOL):    Location -   Severity -        Minimal acceptable level (0-10 scale):  Quality:   Onset:   Duration:                 Aggravating factors -  Relieving factors -  Radiation -    PAIN AD Score:     http://geriatrictoolkit.[email or web address]/painad.pdf (press ctrl +  left click to view)    Dyspnea:  Yes [ ] No [ ] - [ ]Mild [ ]Moderate [ ]Severe  Anxiety:    Yes [ ] No [ ] - [ ]Mild [ ]Moderate [ ]Severe  Fatigue:    Yes [ ] No [ ] - [ ]Mild [ ]Moderate [ ]Severe  Nausea:    Yes [ ] No [ ] - [ ]Mild [ ]Moderate [ ]Severe                         Loss of appetite: Yes [ ] No [ ] - [ ]Mild [ ]Moderate [ ]Severe             Constipation:  Yes [ ] No [ ] - [ ]Mild [ ]Moderate [ ]Severe  Grief: Yes [ ] No [ ]     Other Symptoms:  [ ]All other review of systems negative     Karnofsky Performance Score/Palliative Performance Status Version 2:         %    http://palliative.info/resource_material/PPSv2.pdf    PHYSICAL EXAM:  Vital Signs Last 24 Hrs  T(C): 35.9 (18 Dec 2019 04:40), Max: 35.9 (17 Dec 2019 16:41)  T(F): 96.6 (18 Dec 2019 04:40), Max: 96.7 (17 Dec 2019 23:23)  HR: 79 (18 Dec 2019 09:43) (72 - 112)  BP: 134/82 (18 Dec 2019 06:47) (107/67 - 134/82)  BP(mean): --  RR: 19 (18 Dec 2019 04:40) (17 - 32)  SpO2: 98% (18 Dec 2019 09:43) (85% - 99%) I&O's Summary    17 Dec 2019 07:01  -  18 Dec 2019 07:00  --------------------------------------------------------  IN: 230 mL / OUT: 1450 mL / NET: -1220 mL        GENERAL:  [ ]Alert  [ ]Oriented x   [ ]Lethargic  [ ]Cachexia  [ ]Unarousable  [ ]Verbal  [ ]Non-Verbal  Behavioral:   [ ] Anxiety  [ ] Delirium [ ] Agitation [ ] Other  HEENT:  [ ]Normal   [ ]Dry mouth   [ ]ET Tube/Trach  [ ]Oral lesions  PULMONARY:   [ ]Clear  [ ]Tachypnea  [ ]Audible excessive secretions   [ ]Rhonchi        [ ]Right [ ]Left [ ]Bilateral  [ ]Crackles        [ ]Right [ ]Left [ ]Bilateral  [ ]Wheezing     [ ]Right [ ]Left [ ]Bilateral  CARDIOVASCULAR:    [ ]Regular [ ]Irregular [ ]Tachy  [ ]Rafael [ ]Murmur [ ]Other  GASTROINTESTINAL:  [ ]Soft  [ ]Distended   [ ]+BS  [ ]Non tender [ ]Tender  [ ]PEG [ ]OGT/ NGT  Last BM:     GENITOURINARY:  [ ]Normal [ ] Incontinent   [ ]Oliguria/Anuria   [ ]Delong  MUSCULOSKELETAL:   [ ]Normal   [ ]Weakness  [ ]Bed/Wheelchair bound [ ]Edema  NEUROLOGIC:   [ ]No focal deficits  [ ] Cognitive impairment  [ ] Dysphagia [ ]Dysarthria [ ] Paresis [ ]Other   SKIN:   [ ]Normal   [ ]Pressure ulcer(s)  [ ]Rash    LABS:                        12.7   8.04  )-----------( 308      ( 18 Dec 2019 07:54 )             38.2   12-18    133<L>  |  95<L>  |  27<H>  ----------------------------<  91  4.1   |  30  |  1.15    Ca    9.1      18 Dec 2019 07:54  Phos  3.7     12-17  Mg     2.0     12-17          RADIOLOGY & ADDITIONAL STUDIES:    PROTEIN CALORIE MALNUTRITION PRESENT: [ ] Yes [ ] No  [ ] PPSV2 < or = to 30% [ ] significant weight loss  [ ] poor nutritional intake [ ] catabolic state [ ] anasarca     Albumin, Serum: 2.8 g/dL (12-16-19 @ 08:17)      REFERRALS:   [ ]Chaplaincy  [ ] Hospice  [ ]Child Life  [ ]Social Work  [ ]Case management [ ]Holistic Therapy   Goals of Care Discussion Document: HPI:  · Chief Complaint: The patient is a 89y Female complaining of difficulty breathing.  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 + b/l LE edema x 1 week.  family also noted pt a little more confused in past 4 days. Pt had routine PMD visit and noted hyponatremia and was awaiting fu tomorrow. pt is AOX3, c/o tachypnea, but denies headache, dizziness, cp, abd pain. Pt c/o b/l calf pain. pt normally ambulates with cane up until last week walking with walker.    	ROS: No fever/chills. No photophobia/eye pain/changes in vision, No ear pain/sore throat/dysphagia, No chest pain/palpitations. +SOB, no cough. No abdominal pain, No N/V/D, no black/bloody bm. No dysuria/frequency/discharge, No headache. No Dizziness.  No rash.  No numbness/tingling/  +weakness +  decreased  hearing (15 Dec 2019 16:54)    PERTINENT PM/SXH:   Atrial fibrillation  Hypothyroidism  HTN (hypertension)  HLD (hyperlipidemia)    No significant past surgical history    FAMILY HISTORY:  No pertinent family history in first degree relatives      SOCIAL HISTORY: Daughter has been staying with patient since her August hospitalization  Significant other/partner: Yes [ ]  No [ x] Children:  Yes [ ]  No [x ] Rastafari/Spirituality: Christian  Substance hx: Yes[ ]  No [ x]   Tobacco hx:  Yes [ ] No [x ]   Alcohol hx: Yes [ ] No [x ]   Home Opioid hx:  Yes [ ] No [ ]  [ ] I-Stop Reference No:  Living Situation: [x ]Home  [ ]Long term care  [ ]Rehab [ ]Other    ADVANCE DIRECTIVES:    DNR  Yes  MOLST  Yes [ ] No [x ]  Living Will  Yes [x ]  No [ ]     [ x] Health Care Proxy(s)  [ ] Surrogate(s)  [ ] Guardian           Name(s): Phone Number(s): All 3 children jointly- Larry [phone] [phone] [phone]    BASELINE (I)ADL(s) (prior to admission):  [place]: [ ]Total  [x ] Moderate [ ]Dependent    Allergies    No Known Allergies    Intolerances    MEDICATIONS  (STANDING):  amLODIPine   Tablet 5 milliGRAM(s) Oral daily  ATENolol  Tablet 25 milliGRAM(s) Oral every 12 hours  furosemide   Injectable 40 milliGRAM(s) IV Push two times a day  heparin  Injectable 5000 Unit(s) SubCutaneous every 12 hours  senna 2 Tablet(s) Oral at bedtime    MEDICATIONS  (PRN):  benzocaine 15 mG/menthol 3.6 mG (Sugar-Free) Lozenge 1 Lozenge Oral every 6 hours PRN Sore Throat    PRESENT SYMPTOMS: [ ]Unable to obtain due to poor mentation   Source if other than patient:  [ ]Family   [ ]Team     Pain (Impact on QOL):  no pain  Location -   Severity -        Minimal acceptable level (0-10 scale):  Quality:   Onset:   Duration:                 Aggravating factors -  Relieving factors -  Radiation -    PAIN AD Score:     [email or web address].[email or web address]/painad.pdf (press ctrl +  left click to view)    Dyspnea:  Yes [x ] No [ ] - [ ]Mild [x ]Moderate [ ]Severe  Anxiety:    Yes [ ] No [ x] - [ ]Mild [ ]Moderate [ ]Severe  Fatigue:    Yes [ ] No [ x] - [ ]Mild [ ]Moderate [ ]Severe  Nausea:    Yes [ ] No [ x] - [ ]Mild [ ]Moderate [ ]Severe                         Loss of appetite: Yes [ ] No [x ] - [ ]Mild [ ]Moderate [ ]Severe             Constipation:  Yes [ ] No [ x] - [ ]Mild [ ]Moderate [ ]Severe  Grief: Yes [ ] No [ x]     Other Symptoms:  [x ]All other review of systems negative     Karnofsky Performance Score/Palliative Performance Status Version 2:     20-30    %    http://palliative.info/resource_material/PPSv2.pdf    PHYSICAL EXAM:  Vital Signs Last 24 Hrs  T(C): 35.9 (18 Dec 2019 04:40), Max: 35.9 (17 Dec 2019 16:41)  T(F): 96.6 (18 Dec 2019 04:40), Max: 96.7 (17 Dec 2019 23:23)  HR: 79 (18 Dec 2019 09:43) (72 - 112)  BP: 134/82 (18 Dec 2019 06:47) (107/67 - 134/82)  BP(mean): --  RR: 19 (18 Dec 2019 04:40) (17 - 32)  SpO2: 98% (18 Dec 2019 09:43) (85% - 99%) I&O's Summary    17 Dec 2019 07:01  -  18 Dec 2019 07:00  --------------------------------------------------------  IN: 230 mL / OUT: 1450 mL / NET: -1220 mL        GENERAL:  [x ]Alert  [x ]Oriented x 4  [ ]Lethargic  [x ]Cachexia  [ ]Unarousable  [x ]Verbal  [ ]Non-Verbal  Behavioral:   [ ] Anxiety  [ ] Delirium [ ] Agitation [ ] Other  HEENT:  [ ]Normal   [ x]Dry mouth   [ ]ET Tube/Trach  [ ]Oral lesions  PULMONARY:   [ ]Clear  [ x]Tachypnea  [ ]Audible excessive secretions   [ ]Rhonchi        [ ]Right [ ]Left [ ]Bilateral  [ x]Crackles        [ ]Right [ ]Left [x ]Bilateral  [ ]Wheezing     [ ]Right [ ]Left [ ]Bilateral  CARDIOVASCULAR:    [ ]Regular [x ]Irregular [ ]Tachy  [ ]Rafael [ ]Murmur [ ]Other  GASTROINTESTINAL:  [ x]Soft  [ ]Distended   [x ]+BS  [ x]Non tender [ ]Tender  [ ]PEG [ ]OGT/ NGT  Last BM: 12/18    GENITOURINARY:  [ ]Normal [ x] Incontinent   [ ]Oliguria/Anuria   [ ]Delong  MUSCULOSKELETAL:   [ ]Normal   [ x]Weakness  [ ]Bed/Wheelchair bound [ x]Edema  NEUROLOGIC:   [ x]No focal deficits  [ ] Cognitive impairment  [ ] Dysphagia [ ]Dysarthria [ ] Paresis [ x]Other - hearing impaired  SKIN:   [x ]Normal   [ ]Pressure ulcer(s)  [ ]Rash    LABS:                        12.7   8.04  )-----------( 308      ( 18 Dec 2019 07:54 )             38.2   12-18    133<L>  |  95<L>  |  27<H>  ----------------------------<  91  4.1   |  30  |  1.15    Ca    9.1      18 Dec 2019 07:54  Phos  3.7     12-17  Mg     2.0     12-17          RADIOLOGY & ADDITIONAL STUDIES:    < from: CT Angio Chest w/ IV Cont (12.15.19 @ 15:58) >    EXAM:  CT ANGIO CHEST (W)AW IC                            PROCEDURE DATE:  12/15/2019          INTERPRETATION:  CLINICAL INFORMATION: Shortness of breath. Rule out   pulmonary embolism.    COMPARISON: None.    PROCEDURE:   CT Angiography of the Chest.  76 ml of Omnipaque 350 was injected intravenously. 24 ml were discarded.  Sagittal and coronal reformats were performed as well as 3D (MIP)   reconstructions.      FINDINGS:    LUNGS AND AIRWAYS: Patent central airways.  Compressive atelectasis   involving both lower lobes. Additional foci of atelectasis in the right   middle lobe and lingula. No suspicious nodule or mass. Mild ill-defined   bilateral groundglass opacities suggestive of pulmonary edema.    PLEURA: Large bilateral pleural effusions, right side greater than left.    MEDIASTINUM AND ABILIO: No lymphadenopathy.    VESSELS: Adequate pulmonary arterial opacification. No evidence of   embolism. Normal caliber aorta.    HEART: Heart is enlarged. No pericardial effusion.    CHEST WALL AND LOWER NECK: Within normal limits.    VISUALIZED UPPER ABDOMEN: Within normal limits.    BONES: No acute abnormality.    IMPRESSION:     No pulmonary embolism.  Large bilateral pleural effusions.  Mild pulmonary edema.                    MIRANDA WOLFE M.D., ATTENDING RADIOLOGIST  This document has been electronically signed. Dec 15 2019  4:10PM    < end of copied text >  < from: CT Head No Cont (12.15.19 @ 15:57) >    EXAM:  CT BRAIN                            PROCEDURE DATE:  12/15/2019          INTERPRETATION:  CLINICAL INFORMATION: Confusion.    COMPARISON: 8/14/2019.    PROCEDURE:    Axial sections of the brain were obtained from base to vertex, without   the intravenous administration of contrast material. Sagittal and coronal   reformats were then generated from the axial images.    FINDINGS:    There is right temporal and left occipital encephalomalacia.    There is no intracranial hemorrhage, mass orshift of the midline   structures. There are involutional changes. Small vessel white matter   ischemic changes are noted.     The brain stem is unremarkable.  No cerebellopontine angle lesion is   appreciated.     The fourth, third and lateral ventricles are normal position.  No   subdural or epidural collections are present.     No fracture or destructive lesion is identified.     The mastoids are clear. There is paranasal sinus mucosal thickening.    IMPRESSION:    Involutional and ischemic gliotic changes.  No acute intracranial hemorrhage.                NATHALIA PANG M.D., ATTENDING RADIOLOGIST  This document has been electronically signed. Dec 15 2019  4:10PM              < end of copied text >    PROTEIN CALORIE MALNUTRITION PRESENT: [ x] Yes [ ] No  [x ] PPSV2 < or = to 30% [x ] significant weight loss  [ x] poor nutritional intake [ ] catabolic state [ ] anasarca     Albumin, Serum: 2.8 g/dL (12-16-19 @ 08:17)      REFERRALS:   [ ]Chaplaincy  [ ] Hospice  [ ]Child Life  [ ]Social Work  [ ]Case management [ ]Holistic Therapy   Goals of Care Discussion Document:

## 2019-12-18 NOTE — CONSULT NOTE ADULT - CONSULT REQUESTED DATE/TIME
15-Dec-2019 17:01
16-Dec-2019 11:15
16-Dec-2019 13:12
17-Dec-2019 22:35
18-Dec-2019 11:05
18-Dec-2019 11:11
18-Dec-2019 13:54

## 2019-12-18 NOTE — CONSULT NOTE ADULT - SUBJECTIVE AND OBJECTIVE BOX
Patient is a 89y old  Female who presents with a chief complaint of sob  ryder  hyponatremia pulmonary  edema (18 Dec 2019 11:04)    HPI: 89 year female with HTN, HLD, Hypothyroidism, S/P partial gastrectomy for GI stromal tumor, S/P IVC filter(was placed after gastrectomy,  A Fib( not on AC due to fall and intracranial bleed), CKD3, Hyponatremia.  Brought in with progressively worsening SOB with Orthopnea, bilateral leg edema, altered mental status, and hyponatremia. CTA chest negative for PE but showed bilateral pleural effusion.  Admitted with pulmonary edema, pleural effusion and hyponatremia. Non smoker.  12/17/19: Had RRT for Respiratory distress, now requiring NRB mask.    PAST MEDICAL & SURGICAL HISTORY:  Atrial fibrillation  Hypothyroidism  HTN (hypertension)  HLD (hyperlipidemia)  No significant past surgical history    FAMILY HISTORY:  No pertinent family history in first degree relatives    SOCIAL HISTORY: non smoker    Allergies  No Known Allergies    MEDICATIONS  (STANDING):  amLODIPine   Tablet 5 milliGRAM(s) Oral daily  ATENolol  Tablet 25 milliGRAM(s) Oral every 12 hours  furosemide   Injectable 40 milliGRAM(s) IV Push two times a day  heparin  Injectable 5000 Unit(s) SubCutaneous every 12 hours  senna 2 Tablet(s) Oral at bedtime    MEDICATIONS  (PRN):  benzocaine 15 mG/menthol 3.6 mG (Sugar-Free) Lozenge 1 Lozenge Oral every 6 hours PRN Sore Throat    REVIEW OF SYSTEMS:    Constitutional:            No fever, weight loss or fatigue  HEENT:                       No difficulty hearing, tinnitus, vertigo; No sinus or throat pain  Respiratory:                sob  Cardiovascular:           No chest pain, palpitations  Gastrointestinal:        No abdominal or epigastric pain. No N/V/diarrhea or hematemesis  Genitourinary:            No dysuria, frequency, hematuria or incontinence  SKIN:                             no rash  Musculoskeletal:        swelling  Extremities:                No swelling  Neurological:              No headaches  Psychiatric:                 No depression, anxiety    MACRA & MIPS:  Vaccines - Influenza: no and Pneumovax: no  Tobacco: no  Blood Pressure Screening / Control of: 134/82  Current Medications Reviewed: yes    Vital Signs Last 24 Hrs  T(C): 35.9 (18 Dec 2019 04:40), Max: 35.9 (17 Dec 2019 16:41)  T(F): 96.6 (18 Dec 2019 04:40), Max: 96.7 (17 Dec 2019 23:23)  HR: 79 (18 Dec 2019 09:43) (72 - 112)  BP: 134/82 (18 Dec 2019 06:47) (107/67 - 134/82)  BP(mean): --  RR: 19 (18 Dec 2019 04:40) (17 - 32)  SpO2: 98% (18 Dec 2019 09:43) (85% - 99%)    PHYSICAL EXAM:  GEN:         Awake, responsive and comfortable.  HEENT:    Normal.    RESP:       crackles.  CVS:          Regular rate and rhythm.   ABD:         Soft, non-tender, non-distended;   :             No costovertebral angle tenderness  SKIN:           Warm and dry.  EXTR:            No clubbing, cyanosis or edema  CNS:              Intact sensory and motor function.  PSYCH:        cooperative, no anxiety or depression    LABS:                        12.7   8.04  )-----------( 308      ( 18 Dec 2019 07:54 )             38.2     12-18    133<L>  |  95<L>  |  27<H>  ----------------------------<  91  4.1   |  30  |  1.15    Ca    9.1      18 Dec 2019 07:54  Phos  3.7     12-17  Mg     2.0     12-17 12-18 @ 08:17  pH: 7.48  pCO2: 43  pO2: 84  SaO2: 96  12-18 @ 00:38  pH: 7.46  pCO2: 43  pO2: 59  SaO2: 90  12-17 @ 19:59  pH: 7.44  pCO2: 41  pO2: 69  SaO2: 93  12-15 @ 15:30  pH: 7.40  pCO2: 34  pO2: 72  SaO2: 94    Culture - Urine (collected 12-16-19 @ 00:23)  Source: .Urine Clean Catch (Midstream)  Final Report (12-16-19 @ 18:53):    <10,000 CFU/mL Normal Urogenital Natalia    EKG: A Fib.    RADIOLOGY & ADDITIONAL STUDIES:  < from: CT Angio Chest w/ IV Cont (12.15.19 @ 15:58) >    EXAM:  CT ANGIO CHEST (W)AW IC                          PROCEDURE DATE:  12/15/2019      INTERPRETATION:  CLINICAL INFORMATION: Shortness of breath. Rule out   pulmonary embolism.    COMPARISON: None.    PROCEDURE:   CT Angiography of the Chest.  76 ml of Omnipaque 350 was injected intravenously. 24 ml were discarded.  Sagittal and coronal reformats were performed as well as 3D (MIP)   reconstructions.      FINDINGS:    LUNGS AND AIRWAYS: Patent central airways.  Compressive atelectasis   involving both lower lobes. Additional foci of atelectasis in the right   middle lobe and lingula. No suspicious nodule or mass. Mild ill-defined   bilateral groundglass opacities suggestive of pulmonary edema.    PLEURA: Large bilateral pleural effusions, right side greater than left.    MEDIASTINUM AND ABILIO: No lymphadenopathy.    VESSELS: Adequate pulmonary arterial opacification. No evidence of   embolism. Normal caliber aorta.    HEART: Heart is enlarged. No pericardial effusion.    CHEST WALL AND LOWER NECK: Within normal limits.    VISUALIZED UPPER ABDOMEN: Within normal limits.    BONES: No acute abnormality.    IMPRESSION:     No pulmonary embolism.  Large bilateral pleural effusions.  Mild pulmonary edema.    MIRANDA WOLFE M.D., ATTENDING RADIOLOGIST  This document has been electronically signed. Dec 15 2019  4:10PM      ASSESSMENT AND PLAN:  ·	Acute hypoxic Respiratory failure.  ·	Bilateral pleural effusion.  ·	CHF Systolic/diastolic.  ·	Hyponatremia.  ·	Chronic A Fib.  ·	HTN.  ·	HLD.  ·	Hypothyroidism.  ·	S/P Partial Gastrectomy for tumor.  ·	CKD3..    Continue NRB mask, may change to nasal O2 for PO intake.  Continue diuretics.  Follow echocardiogram.  Detailed discussion with pt and family, agree for thoracentesis.

## 2019-12-18 NOTE — CHART NOTE - NSCHARTNOTEFT_GEN_A_CORE
ICU re-evaluation: 2 AM    Patient seen earlier in the night after having an RRT for hypoxia.   ABG done: results noted and FIO2 increased to 50 %.    Patient received lasix and has been on BIPAP 10/5/50%  Examined now, patient states she feels much better and her breathing is good.   Chest: bilateral air entry, decrease air entry at bases, no wheezing or rales.   Patient saturation is currently 95 %.    Recommendation:   Continue BIPAP  Not a candidate for MICU at this time.

## 2019-12-18 NOTE — CONSULT NOTE ADULT - ASSESSMENT
89 yo female with afib not on AC, pulm edema, SOB on supplemental o2 and b/l pleural effusions  IR consulted for right thoracentesis 89 yo female with CHF,  afib not on AC, pulm edema, SOB on supplemental o2 and b/l pleural effusions  IR consulted for right thoracentesis

## 2019-12-18 NOTE — CONSULT NOTE ADULT - PROBLEM SELECTOR RECOMMENDATION 4
had been placed on BIPAP overnight   remains on lasix with high oxygen requirement   diurese as able  BP/HR control paramount

## 2019-12-18 NOTE — PROCEDURE NOTE - ADDITIONAL PROCEDURE DETAILS
pt s/p diagnostic/therapeutic large volume right thoracentesis with 1500 mLs of serous fluid removed and sent to lab for analysis.  Pt has a h/o CHF, afib, pulm edema, SOB.  Pt tolerated procedure well.  Vitals remained stable.  CXR ordered    -f/u pleural fluid study results  -f/u post procedure CXR results

## 2019-12-18 NOTE — CONSULT NOTE ADULT - PROBLEM SELECTOR PROBLEM 5
"Chief Complaint   Patient presents with    Establish Care     SUBJECTIVE:   Reed Torres is a 74 y.o. male presenting for his annual checkup.  Current Outpatient Prescriptions   Medication Sig Dispense Refill    simvastatin (ZOCOR) 10 MG tablet Take 1 tablet (10 mg total) by mouth every evening. 30 tablet 5    NAPROXEN SODIUM (ALEVE ORAL) Take by mouth.      oxycodone-acetaminophen (PERCOCET) 5-325 mg per tablet Take 1-2 tablets by mouth every 4 (four) hours as needed. 21 tablet 0    polyethylene glycol (GLYCOLAX) 17 gram PwPk Take 17 g by mouth once daily. 30 packet 0    tamsulosin (FLOMAX) 0.4 mg Cp24 TAKE ONE CAPSULE BY MOUTH EVERY DAY 30 capsule 11    zoster vaccine live, PF, (ZOSTAVAX, PF,) 19,400 unit/0.65 mL injection Inject 19,400 Units into the skin once. 1 vial 0     No current facility-administered medications for this visit.      Allergies: Patient has no known allergies.     ROS:  Feeling well. No dyspnea or chest pain on exertion. No abdominal pain, change in bowel habits, black or bloody stools. No urinary tract or prostatic symptoms. No neurological complaints.    OBJECTIVE:   The patient appears well, alert, oriented x 3, in no distress.   /88   Pulse (!) 58   Temp 98.2 °F (36.8 °C) (Oral)   Resp 18   Ht 5' 6" (1.676 m)   Wt 70.1 kg (154 lb 8.7 oz)   SpO2 97%   BMI 24.94 kg/m²   ENT normal.  Neck supple. No adenopathy or thyromegaly. ELISA. Lungs are clear, good air entry, no wheezes, rhonchi or rales. S1 and S2 normal, no murmurs, regular rate and rhythm. Abdomen is soft without tenderness, guarding, mass or organomegaly.  exam: deferred.  Extremities show no edema, normal peripheral pulses. Neurological is normal without focal findings.    ASSESSMENT:   1. Annual physical exam    2. Benign prostatic hyperplasia without lower urinary tract symptoms    3. Spondylosis of lumbar region without myelopathy or radiculopathy    4. Tuberculosis exposure    5. Hyperlipidemia, " unspecified hyperlipidemia type    6. Need for Zostavax administration    7. Special screening for malignant neoplasms, colon        PLAN:   Reed was seen today for establish care.    Diagnoses and all orders for this visit:    Annual physical exam  -     CBC auto differential; Standing  -     Comprehensive metabolic panel; Standing  -     Hemoglobin A1c; Standing  -     Lipid panel; Standing  -     TSH; Standing  -     Uric acid; Future  -     Urinalysis; Standing    Benign prostatic hyperplasia without lower urinary tract symptoms    Spondylosis of lumbar region without myelopathy or radiculopathy    Tuberculosis exposure    Hyperlipidemia, unspecified hyperlipidemia type    Need for Zostavax administration  -     zoster vaccine live, PF, (ZOSTAVAX, PF,) 19,400 unit/0.65 mL injection; Inject 19,400 Units into the skin once.    Special screening for malignant neoplasms, colon  -     Fecal Immunochemical Test (iFOBT); Future           Chronic atrial fibrillation

## 2019-12-18 NOTE — CONSULT NOTE ADULT - PROBLEM SELECTOR RECOMMENDATION 2
per son and daughter at bedside pt has had functional decline since hospital stay in August (fall with ICH) and daughter is now staying with pt to care for her since.

## 2019-12-19 LAB
B PERT IGG+IGM PNL SER: CLEAR — SIGNIFICANT CHANGE UP
COLOR FLD: YELLOW — SIGNIFICANT CHANGE UP
EOSINOPHIL # FLD: 0 % — SIGNIFICANT CHANGE UP
FLUID INTAKE SUBSTANCE CLASS: SIGNIFICANT CHANGE UP
FLUID SEGMENTED GRANULOCYTES: 19 % — SIGNIFICANT CHANGE UP
FOLATE+VIT B12 SERBLD-IMP: 0 % — SIGNIFICANT CHANGE UP
GLUCOSE FLD-MCNC: 115 MG/DL — SIGNIFICANT CHANGE UP
LDH SERPL L TO P-CCNC: 105 U/L — SIGNIFICANT CHANGE UP
LYMPHOCYTES # FLD: 28 % — SIGNIFICANT CHANGE UP
MESOTHL CELL # FLD: 1 % — SIGNIFICANT CHANGE UP
MONOS+MACROS # FLD: 48 % — SIGNIFICANT CHANGE UP
NRBC # FLD: 0 — SIGNIFICANT CHANGE UP
OTHER CELLS FLD MANUAL: 4 % — SIGNIFICANT CHANGE UP
PROT FLD-MCNC: 2.8 G/DL — SIGNIFICANT CHANGE UP
RCV VOL RI: 1000 /UL — HIGH (ref 0–0)
SPECIMEN SOURCE FLD: SIGNIFICANT CHANGE UP
TOTAL NUCLEATED CELL COUNT, BODY FLUID: 1912 /UL — SIGNIFICANT CHANGE UP
TUBE TYPE: SIGNIFICANT CHANGE UP

## 2019-12-19 PROCEDURE — 99233 SBSQ HOSP IP/OBS HIGH 50: CPT

## 2019-12-19 PROCEDURE — 99232 SBSQ HOSP IP/OBS MODERATE 35: CPT

## 2019-12-19 RX ORDER — SALIVA SUBSTITUTE COMB NO.11 351 MG
5 POWDER IN PACKET (EA) MUCOUS MEMBRANE THREE TIMES A DAY
Refills: 0 | Status: DISCONTINUED | OUTPATIENT
Start: 2019-12-19 | End: 2019-12-21

## 2019-12-19 RX ADMIN — AMLODIPINE BESYLATE 5 MILLIGRAM(S): 2.5 TABLET ORAL at 05:54

## 2019-12-19 RX ADMIN — HEPARIN SODIUM 5000 UNIT(S): 5000 INJECTION INTRAVENOUS; SUBCUTANEOUS at 17:27

## 2019-12-19 RX ADMIN — Medication 5 MILLILITER(S): at 21:45

## 2019-12-19 RX ADMIN — SENNA PLUS 2 TABLET(S): 8.6 TABLET ORAL at 21:45

## 2019-12-19 RX ADMIN — HEPARIN SODIUM 5000 UNIT(S): 5000 INJECTION INTRAVENOUS; SUBCUTANEOUS at 05:54

## 2019-12-19 RX ADMIN — ATENOLOL 25 MILLIGRAM(S): 25 TABLET ORAL at 05:54

## 2019-12-19 RX ADMIN — Medication 5 MILLILITER(S): at 15:53

## 2019-12-19 RX ADMIN — Medication 40 MILLIGRAM(S): at 17:28

## 2019-12-19 RX ADMIN — Medication 40 MILLIGRAM(S): at 05:54

## 2019-12-19 NOTE — PROGRESS NOTE ADULT - SUBJECTIVE AND OBJECTIVE BOX
INTERVAL HPI/OVERNIGHT EVENTS:    S/P  THORACOCENTESIS FEELS  MUCH  IMPROVED    REVIEW OF SYSTEMS:  CONSTITUTIONAL:  no  complaints    NECK: No pain or stiffnes  RESPIRATORY: No SOB   CARDIOVASCULAR: No chest pain, palpitations, dizziness,   GASTROINTESTINAL: No abdominal pain. No nausea, vomiting,   NEUROLOGICAL: No headaches, no  blurry  vision no  dizziness  SKIN: No itching,   MUSCULOSKELETAL: No pain    MEDICATION:  amLODIPine   Tablet 5 milliGRAM(s) Oral daily  ATENolol  Tablet 25 milliGRAM(s) Oral every 12 hours  benzocaine 15 mG/menthol 3.6 mG (Sugar-Free) Lozenge 1 Lozenge Oral every 6 hours PRN  furosemide   Injectable 40 milliGRAM(s) IV Push two times a day  heparin  Injectable 5000 Unit(s) SubCutaneous every 12 hours  senna 2 Tablet(s) Oral at bedtime    Vital Signs Last 24 Hrs  T(C): 36.1 (19 Dec 2019 05:17), Max: 36.1 (18 Dec 2019 16:36)  T(F): 97 (19 Dec 2019 05:17), Max: 97 (19 Dec 2019 05:17)  HR: 76 (19 Dec 2019 05:17) (75 - 119)  BP: 129/76 (19 Dec 2019 05:17) (96/54 - 129/76)  BP(mean): --  RR: 18 (19 Dec 2019 05:17) (16 - 19)  SpO2: 97% (19 Dec 2019 05:17) (97% - 100%)    PHYSICAL EXAM:  GENERAL: NAD, well-groomed, well-developed  HEENT : [place]  clear sclerae anicteric  NECK: Supple, No JVD, Normal thyroid  NERVOUS SYSTEM:  Alert oriented   no  focal  deficits;   CHEST/LUNG: decreased  bs   HEART: Regular rate and rhythm; No murmurs, rubs, or gallops  ABDOMEN: Soft, Nontender, Nondistended; Bowel sounds present  EXTREMITIES:  no  edema no  tenderness  SKIN: No rashes   LABS:                        12.7   8.04  )-----------( 308      ( 18 Dec 2019 07:54 )             38.2     12-18    133<L>  |  95<L>  |  27<H>  ----------------------------<  91  4.1   |  30  |  1.15    Ca    9.1      18 Dec 2019 07:54  Phos  3.7     12-17  Mg     2.0     12-17    TPro  6.8  /  Alb  x   /  TBili  x   /  DBili  x   /  AST  x   /  ALT  x   /  AlkPhos  x   12-18        CAPILLARY BLOOD GLUCOSE          RADIOLOGY & ADDITIONAL TESTS:    Imaging reports  Personally Reviewed:  [X ] YES  [ ] NO    Consultant(s) Notes Reviewed:  [ X] YES  [ ] NO    Care Discussed with Consultants/Other Providers [X ] YES  [ ] NO  Assessment and Plan:   Problem/Plan - 1:  ·  Problem: Pulmonary edema.  Plan: lasix  betablocker       Problem/Plan - 2:  ·  Problem: Pleural effusion.  Plan: lasix monitor  POST  THORACOCENTESIS    Problem/Plan - 3:  ·  Problem: Hyponatremia.  Plan: fluid  restrict  continue  Na tabs  check urine osmolality  Na  K  Cl.    Problem/Plan - 4:  ·  Problem: Hypothyroidism.  Plan: continue  synthroid chack levels.    Problem/Plan - 5:  ·  Problem: Atrial fibrillation.  Plan: rate  control.     Problem/Plan - 6:  Problem: HLD (hyperlipidemia). Plan: no clinical advantage  to primary  prevention will d/c.

## 2019-12-19 NOTE — PROGRESS NOTE ADULT - SUBJECTIVE AND OBJECTIVE BOX
Patient s/p right thoracentesis.  Pt states she feels better, now off non-rebreather and on NC.  Insertion site is CDI.  Gram stain shows no growth, cx pending.  +transudative.  No leukocytosis, afebrile, other VSS.        PHYSICAL EXAM:    Vital Signs Last 24 Hrs  T(C): 36.1 (19 Dec 2019 05:17), Max: 36.1 (18 Dec 2019 16:36)  T(F): 97 (19 Dec 2019 05:17), Max: 97 (19 Dec 2019 05:17)  HR: 83 (19 Dec 2019 08:58) (75 - 119)  BP: 129/76 (19 Dec 2019 05:17) (96/54 - 129/76)  BP(mean): --  RR: 18 (19 Dec 2019 05:17) (16 - 19)  SpO2: 94% (19 Dec 2019 08:58) (94% - 100%)    General:  A & O x3, NAD  Lungs:  decreased bs to left  Cardiovascular:  good S1, S2,   Abdomen:  Soft, non-tender, non-distended, normoactive bowel sounds, no HSM  Extremities:  no calf swelling/tenderness b/l        LABS:                        12.7   8.04  )-----------( 308      ( 18 Dec 2019 07:54 )             38.2     12-18    133<L>  |  95<L>  |  27<H>  ----------------------------<  91  4.1   |  30  |  1.15    Ca    9.1      18 Dec 2019 07:54  Phos  3.7     12-17  Mg     2.0     12-17    TPro  6.8  /  Alb  x   /  TBili  x   /  DBili  x   /  AST  x   /  ALT  x   /  AlkPhos  x   12-18          RADIOLOGY & ADDITIONAL STUDIES:

## 2019-12-19 NOTE — PROGRESS NOTE ADULT - ASSESSMENT
89 yo female s/p right thoracentesis.  Pt doing better.  As per pulmonology, wants left thoracentesis 89 yo female s/p right thoracentesis.  Pt doing better.  +Transudative  As per pulmonology, wants left thoracentesis  Pt has a h/o CHF, CKD, afib, gastric tumor (gist), pulmonary edema and SOB

## 2019-12-19 NOTE — PROGRESS NOTE ADULT - PROBLEM SELECTOR PLAN 2
functional decline since hospitalized in August  daughter now staying with pt and caring for her.  preference to have pt return home once medically stable for d/c per son at bedside.

## 2019-12-19 NOTE — PROGRESS NOTE ADULT - SUBJECTIVE AND OBJECTIVE BOX
89y Female complaining of difficulty breathing.  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 + b/l LE edema x 1 week.  family also noted pt a little more confused in past 4 days. Pt had routine PMD visit and noted hyponatremia and was awaiting fu tomorrow. pt is AOX3, c/o tachypnea, but denies headache, dizziness, cp, abd pain. Pt c/o b/l calf pain. pt normally ambulates with cane up until last week walking with walker.    	ROS: No fever/chills. No photophobia/eye pain/changes in vision, No ear pain/sore throat/dysphagia, No chest pain/palpitations. +SOB, no cough. No abdominal pain, No N/V/D, no black/bloody bm. No dysuria/frequency/discharge, No headache. No Dizziness.  No rash.  No numbness/tingling/  +weakness +  decreased  hearing (15 Dec 2019 16:54)      Chief Complaint:  Patient is a 89y old  Female who presents with a chief complaint of sob  ryder  hyponatremia pulmonary  edema (16 Dec 2019 17:14)      Review of Systems:    General:  No wt loss, fevers, chills, night sweats  Eyes:  Good vision, no reported pain  ENT:  No sore throat, pain, runny nose, dysphagia  CV:   palpitations,   Resp:   dyspnea,   GI:  No pain, nausea, vomiting, diarrhea, constipation           Social History/Family History  SOCHX:   tobacco,  -  alcohol    FMHX: FA/MO  - contributory       Discussed with:  PMD, Family    Physical Exam:    Vital Signs:  Vital Signs Last 24 Hrs  T(C): 36.3 (17 Dec 2019 05:08), Max: 36.6 (16 Dec 2019 23:30)  T(F): 97.3 (17 Dec 2019 05:08), Max: 97.8 (16 Dec 2019 23:30)  HR: 78 (17 Dec 2019 05:08) (78 - 109)  BP: 112/65 (17 Dec 2019 05:08) (90/67 - 120/66)  BP(mean): --  RR: 16 (17 Dec 2019 05:08) (16 - 18)  SpO2: 93% (17 Dec 2019 05:08) (92% - 99%)  Daily     Daily Weight in k.4 (17 Dec 2019 05:08)  I&O's Summary    16 Dec 2019 07:01  -  17 Dec 2019 07:00  --------------------------------------------------------  IN: 220 mL / OUT: 0 mL / NET: 220 mL        Chest:  decreased at bases  Cardiovascular:  irRegular rhythm, S1, S2, no murmur/rub/S3/S4, no carotid/femoral/abdominal bruit, radial/pedal pulses 2+,  edema  Abdomen:  Soft, non-tender, non-distended, normoactive bowel sounds, no HSM      Laboratory:                          11.8   8.07  )-----------( 319      ( 16 Dec 2019 08:17 )             34.8     12-16    129<L>  |  94<L>  |  19  ----------------------------<  99  3.5   |  27  |  0.87    Ca    8.7      16 Dec 2019 08:17  Mg     1.9     12-15    TPro  6.6  /  Alb  2.8<L>  /  TBili  0.5  /  DBili  x   /  AST  29  /  ALT  40  /  AlkPhos  141<H>  12-16    ABG - ( 15 Dec 2019 15:30 )  pH, Arterial: x     pH, Blood: 7.40  /  pCO2: 34    /  pO2: 72    / HCO3: 21    / Base Excess: -3.0  /  SaO2: 94                CARDIAC MARKERS ( 15 Dec 2019 14:35 )  <.015 ng/mL / x     / x     / x     / x          CAPILLARY BLOOD GLUCOSE        LIVER FUNCTIONS - ( 16 Dec 2019 08:17 )  Alb: 2.8 g/dL / Pro: 6.6 gm/dL / ALK PHOS: 141 U/L / ALT: 40 U/L / AST: 29 U/L / GGT: x           PT/INR - ( 15 Dec 2019 14:35 )   PT: 11.1 sec;   INR: 0.99 ratio         PTT - ( 15 Dec 2019 14:35 )  PTT:32.6 sec  Urinalysis Basic - ( 15 Dec 2019 17:27 )    Color: Yellow / Appearance: Clear / S.015 / pH: x  Gluc: x / Ketone: Negative  / Bili: Negative / Urobili: Negative mg/dL   Blood: x / Protein: 15 mg/dL / Nitrite: Negative   Leuk Esterase: Negative / RBC: x / WBC x   Sq Epi: x / Non Sq Epi: Few / Bacteria: Few      Assessment:  I am asked for consultation on this patient with edema and pleural effusions  The BNP is approximately in the 6000 range, cardiac enzymes remain normal  The EKG reveals atrial fibrillation  Lasix IV push is initiated and will continue  nephrology consultation and noted, Patient's sodium is slowly improving  heart rate and systolic blood pressure remained stable  IV Lasix  post thoracentesis with improvement, thoracentesis of the opposite side for tomorrow

## 2019-12-19 NOTE — PROGRESS NOTE ADULT - PROBLEM SELECTOR PLAN 5
ACTIVITIES THERAPY PROGRESS NOTE    Group time:1220    The patient was unavailable for group. rate control  on Atenolol

## 2019-12-19 NOTE — PROGRESS NOTE ADULT - SUBJECTIVE AND OBJECTIVE BOX
INTERVAL HPI:  89 year female with HTN, HLD, Hypothyroidism, S/P partial gastrectomy for GI stromal tumor, S/P IVC filter(was placed after gastrectomy,  A Fib( not on AC due to fall and intracranial bleed), CKD3, Hyponatremia.  Brought in with progressively worsening SOB with Orthopnea, bilateral leg edema, altered mental status, and hyponatremia. CTA chest negative for PE but showed bilateral pleural effusion.  Admitted with pulmonary edema, pleural effusion and hyponatremia. Non smoker.  12/17/19: Had RRT for Respiratory distress, now requiring NRB mask.  12/18/19: 1.5 litre removed with right thoracentesis. TRANSUDATE.    OVERNIGHT EVENTS:  Much more comfortable post thoracentesis.    Vital Signs Last 24 Hrs  T(C): 36.1 (19 Dec 2019 05:17), Max: 36.1 (18 Dec 2019 16:36)  T(F): 97 (19 Dec 2019 05:17), Max: 97 (19 Dec 2019 05:17)  HR: 83 (19 Dec 2019 08:58) (75 - 119)  BP: 129/76 (19 Dec 2019 05:17) (96/54 - 129/76)  BP(mean): --  RR: 18 (19 Dec 2019 05:17) (16 - 19)  SpO2: 94% (19 Dec 2019 08:58) (94% - 100%)    PHYSICAL EXAM:  GEN:         Awake, responsive and comfortable.  HEENT:    Normal.    RESP:       no distress  CVS:             Regular rate and rhythm.   ABD:         Soft, non-tender, non-distended;     MEDICATIONS  (STANDING):  amLODIPine   Tablet 5 milliGRAM(s) Oral daily  ATENolol  Tablet 25 milliGRAM(s) Oral every 12 hours  furosemide   Injectable 40 milliGRAM(s) IV Push two times a day  heparin  Injectable 5000 Unit(s) SubCutaneous every 12 hours  senna 2 Tablet(s) Oral at bedtime    MEDICATIONS  (PRN):  benzocaine 15 mG/menthol 3.6 mG (Sugar-Free) Lozenge 1 Lozenge Oral every 6 hours PRN Sore Throat    LABS:                        12.7   8.04  )-----------( 308      ( 18 Dec 2019 07:54 )             38.2     12-18    133<L>  |  95<L>  |  27<H>  ----------------------------<  91  4.1   |  30  |  1.15    Ca    9.1      18 Dec 2019 07:54  Phos  3.7     12-17  Mg     2.0     12-17    TPro  6.8  /  Alb  x   /  TBili  x   /  DBili  x   /  AST  x   /  ALT  x   /  AlkPhos  x   12-18 12-18 @ 08:17  pH: 7.48  pCO2: 43  pO2: 84  SaO2: 96  12-18 @ 00:38  pH: 7.46  pCO2: 43  pO2: 59  SaO2: 90  12-17 @ 19:59  pH: 7.44  pCO2: 41  pO2: 69  SaO2: 93  12-15 @ 15:30  pH: 7.40  pCO2: 34  pO2: 72  SaO2: 94    ASSESSMENT AND PLAN:  ·	Acute hypoxic Respiratory failure.  ·	Bilateral pleural effusion. S/P right thoracentesis . TRANSUDATE.  ·	CHF Systolic/diastolic.  ·	Hyponatremia.  ·	Chronic A Fib.  ·	HTN.  ·	HLD.  ·	Hypothyroidism.  ·	S/P Partial Gastrectomy for tumor.  ·	CKD3.    Pleural fluid is transudate.  Much better post thoracentesis  with removal of 1.5 litres.  Daughter and son agree for left thoracentesis also.

## 2019-12-19 NOTE — PROGRESS NOTE ADULT - SUBJECTIVE AND OBJECTIVE BOX
INTERVAL HPI/OVERNIGHT EVENTS: 1.5L drained from R chest via thoracentesis, breathing improved    Code Status: DNR/I  Allergies    No Known Allergies    Intolerances    MEDICATIONS  (STANDING):  amLODIPine   Tablet 5 milliGRAM(s) Oral daily  ATENolol  Tablet 25 milliGRAM(s) Oral every 12 hours  Biotene Dry Mouth Oral Rinse 5 milliLiter(s) Swish and Spit three times a day  furosemide   Injectable 40 milliGRAM(s) IV Push two times a day  heparin  Injectable 5000 Unit(s) SubCutaneous every 12 hours  senna 2 Tablet(s) Oral at bedtime    MEDICATIONS  (PRN):  benzocaine 15 mG/menthol 3.6 mG (Sugar-Free) Lozenge 1 Lozenge Oral every 6 hours PRN Sore Throat      PRESENT SYMPTOMS: [ ]Unable to obtain due to poor mentation   Source if other than patient:  [ ]Family   [ ]Team     Pain (Impact on QOL):  denies pain  Location:  Severity:  Minimal acceptable level (0-10 scale):       Quality:       Onset:  Duration:  Aggravating factors:  Relieving Factors  Radiation:    Dyspnea:  Yes [ ] No [x ] - [ ]Mild [ ]Moderate [ ]Severe  Anxiety:    Yes [ ] No [ x] - [ ]Mild [ ]Moderate [ ]Severe  Fatigue:    Yes [ x] No [ ] - [x ]Mild [ ]Moderate [ ]Severe  Nausea:    Yes [ ] No [ x] - [ ]Mild [ ]Moderate [ ]Severe                         Loss of appetite: Yes [ ] No [ x] - [ ]Mild [ ]Moderate [ ]Severe             Constipation:  Yes [ ] No [x ] - [ ]Mild [ ]Moderate [ ]Severe  Grief: Yes [ ] No [x ]     PAIN AD Score:	  [email or web address]/cog/painad.pdf (Ctrl + left click to view)    Other Symptoms:  [ x]All other review of systems negative     Karnofsky Performance Score/Palliative Performance Status Version 2:         30%    http://palliative.info/resource_material/PPSv2.pdf    PHYSICAL EXAM:  Vital Signs Last 24 Hrs  T(C): 35.7 (19 Dec 2019 12:29), Max: 36.1 (18 Dec 2019 16:36)  T(F): 96.3 (19 Dec 2019 12:29), Max: 97 (19 Dec 2019 05:17)  HR: 103 (19 Dec 2019 12:29) (75 - 119)  BP: 98/60 (19 Dec 2019 12:29) (96/54 - 129/76)  BP(mean): --  RR: 18 (19 Dec 2019 12:29) (16 - 18)  SpO2: 95% (19 Dec 2019 12:29) (94% - 100%) I&O's Summary    18 Dec 2019 07:01  -  19 Dec 2019 07:00  --------------------------------------------------------  IN: 370 mL / OUT: 800 mL / NET: -430 mL    19 Dec 2019 07:01  -  19 Dec 2019 13:06  --------------------------------------------------------  IN: 0 mL / OUT: 200 mL / NET: -200 mL         GENERAL:  [x ]Alert  [ x]Oriented x 3   [ ]Lethargic  [ x]Cachexia  [ ]Unarousable  [ x]Verbal  [ ]Non-Verbal  Behavioral:   [ ] Anxiety  [ ] Delirium [ ] Agitation [ ] Other  HEENT:  [ ]Normal   [x ]Dry mouth   [ ]ET Tube/Trach  [ ]Oral lesions  PULMONARY:   [ ]Clear [ ]Tachypnea  [ ]Audible excessive secretions   [ ]Rhonchi        [ ]Right [ ]Left [ ]Bilateral  [x ]Crackles        [ ]Right [ ]Left [x ]Bilateral  [ ]Wheezing     [ ]Right [ ]Left [ ]Bilateral  CARDIOVASCULAR:    [ ]Regular [ x]Irregular [x ]Tachy  [ ]Rafael [ ]Murmur [ ]Other  GASTROINTESTINAL:  x[ ]Soft  [ ]Distended   [x ]+BS  [ x]Non tender [ ]Tender  [ ]PEG [ ]OGT/ NGT   Last BM: 12/19     GENITOURINARY:  [ ]Normal [x ] Incontinent   [ ]Oliguria/Anuria   [ ]Delong  MUSCULOSKELETAL:   [ ]Normal   [x ]Weakness  [ ]Bed/Wheelchair bound [ x]Edema  NEUROLOGIC:   [x ]No focal deficits  [ ] Cognitive impairment  [ ] Dysphagia [ ]Dysarthria [ ] Paresis [ ]Other   SKIN:   [x ]Normal   [ ]Pressure ulcer(s)  [ ]Rash    CRITICAL CARE:  [ ] Shock Present  [ ]Septic [ ]Cardiogenic [ ]Neurologic [ ]Hypovolemic  [ ]  Vasopressors [ ]  Inotropes   [ ] Respiratory failure present  [ ] Acute  [ ] Chronic [ ] Hypoxic  [ ] Hypercarbic [ ] Other  [ ] Other organ failure     LABS:                        12.7   8.04  )-----------( 308      ( 18 Dec 2019 07:54 )             38.2   12-18    133<L>  |  95<L>  |  27<H>  ----------------------------<  91  4.1   |  30  |  1.15    Ca    9.1      18 Dec 2019 07:54  Phos  3.7     12-17  Mg     2.0     12-17    TPro  6.8  /  Alb  x   /  TBili  x   /  DBili  x   /  AST  x   /  ALT  x   /  AlkPhos  x   12-18        RADIOLOGY & ADDITIONAL STUDIES: < from: Xray Chest 1 View- PORTABLE-Routine (12.18.19 @ 14:52) >    EXAM:  US THORACENTESIS RT SISC                          EXAM:  XR CHEST PORTABLE ROUTINE 1V                            PROCEDURE DATE:  12/18/2019          INTERPRETATION:  Procedure: Right thoracentesis. Images saved to PACS.    Clinical Information: Respiratory distress, right pleural effusion    Technique: Informed consent obtained. The patient was placed in the upright position. The Right chest was prepped and draped in the usual sterile fashion. Timeout performed. 1% lidocaine used for local anesthesia.    Under ultrasound guidance, a 5 Burm[place] Yueh catheter/needle was placed into the Right pleural space where there was a large effusion. The needle was removed, and 1.5L of serous fluid was aspirated through the catheter. The catheter was removed and a sterile gauze was applied. No complications.    Sedation: None.    Impression: Successful Right thoracentesis, 1.5L aspirated.    Portable postprocedure AP chest x-ray demonstrates no pneumothorax. Trace right pleural effusion. Small left pleural effusion.                TONY TONEY M.D., ATTENDING RADIOLOGIST  This document has been electronically signed. Dec 18 2019  3:13PM        < end of copied text >      Protein Calorie Malnutrition Present: [x ] yes [ ] no  [x ] PPSV2 < or = 30%  [ x] significant weight loss [ x] poor nutritional intake [ ] anasarca [ ] catabolic state Albumin, Serum: 2.8 g/dL (12-16-19 @ 08:17)      REFERRALS:   [ ]Chaplaincy  [ ] Hospice  [ ]Child Life  [ ]Social Work  [ ]Case management [ ]Holistic Therapy   Goals of Care Document:

## 2019-12-20 LAB
ANION GAP SERPL CALC-SCNC: 3 MMOL/L — LOW (ref 5–17)
BUN SERPL-MCNC: 36 MG/DL — HIGH (ref 7–23)
CALCIUM SERPL-MCNC: 8.7 MG/DL — SIGNIFICANT CHANGE UP (ref 8.5–10.1)
CHLORIDE SERPL-SCNC: 99 MMOL/L — SIGNIFICANT CHANGE UP (ref 96–108)
CO2 SERPL-SCNC: 36 MMOL/L — HIGH (ref 22–31)
CREAT SERPL-MCNC: 1.12 MG/DL — SIGNIFICANT CHANGE UP (ref 0.5–1.3)
GLUCOSE SERPL-MCNC: 99 MG/DL — SIGNIFICANT CHANGE UP (ref 70–99)
HCT VFR BLD CALC: 38.5 % — SIGNIFICANT CHANGE UP (ref 34.5–45)
HGB BLD-MCNC: 12.3 G/DL — SIGNIFICANT CHANGE UP (ref 11.5–15.5)
MCHC RBC-ENTMCNC: 27.9 PG — SIGNIFICANT CHANGE UP (ref 27–34)
MCHC RBC-ENTMCNC: 31.9 GM/DL — LOW (ref 32–36)
MCV RBC AUTO: 87.3 FL — SIGNIFICANT CHANGE UP (ref 80–100)
NON-GYNECOLOGICAL CYTOLOGY STUDY: SIGNIFICANT CHANGE UP
NRBC # BLD: 0 /100 WBCS — SIGNIFICANT CHANGE UP (ref 0–0)
PLATELET # BLD AUTO: 252 K/UL — SIGNIFICANT CHANGE UP (ref 150–400)
POTASSIUM SERPL-MCNC: 3.9 MMOL/L — SIGNIFICANT CHANGE UP (ref 3.5–5.3)
POTASSIUM SERPL-SCNC: 3.9 MMOL/L — SIGNIFICANT CHANGE UP (ref 3.5–5.3)
RBC # BLD: 4.41 M/UL — SIGNIFICANT CHANGE UP (ref 3.8–5.2)
RBC # FLD: 15.1 % — HIGH (ref 10.3–14.5)
SODIUM SERPL-SCNC: 138 MMOL/L — SIGNIFICANT CHANGE UP (ref 135–145)
WBC # BLD: 6.74 K/UL — SIGNIFICANT CHANGE UP (ref 3.8–10.5)
WBC # FLD AUTO: 6.74 K/UL — SIGNIFICANT CHANGE UP (ref 3.8–10.5)

## 2019-12-20 PROCEDURE — 71045 X-RAY EXAM CHEST 1 VIEW: CPT | Mod: 26

## 2019-12-20 PROCEDURE — 99233 SBSQ HOSP IP/OBS HIGH 50: CPT

## 2019-12-20 PROCEDURE — 32555 ASPIRATE PLEURA W/ IMAGING: CPT | Mod: LT

## 2019-12-20 RX ORDER — LEVOTHYROXINE SODIUM 125 MCG
50 TABLET ORAL DAILY
Refills: 0 | Status: DISCONTINUED | OUTPATIENT
Start: 2019-12-20 | End: 2019-12-21

## 2019-12-20 RX ADMIN — Medication 5 MILLILITER(S): at 05:31

## 2019-12-20 RX ADMIN — ATENOLOL 25 MILLIGRAM(S): 25 TABLET ORAL at 17:13

## 2019-12-20 RX ADMIN — Medication 5 MILLILITER(S): at 17:13

## 2019-12-20 RX ADMIN — Medication 5 MILLILITER(S): at 22:55

## 2019-12-20 RX ADMIN — SENNA PLUS 2 TABLET(S): 8.6 TABLET ORAL at 22:56

## 2019-12-20 NOTE — PROGRESS NOTE ADULT - SUBJECTIVE AND OBJECTIVE BOX
Patient is a 89y old  Female who presented to [facility] on 12/15/19  with a chief complaint of sob  ryder.    CC: " l walked yesterday and felt good"   HPI:  · HPI Objective Statement: 89 yo female from home with pmh GI stromal tumor s/p partial gastrectomy with ?ppx IVC filter, afib not on AC, CKD Stage 3, HTN, HLD, hypothyroid, chronic hyponatremia on Na 1g BID, h/o SAH/IVH 8/19 who presented to er  with sob at night for past few days and functional decline for a few days prior to admission. daughter at bedside (lives with patient who reports gradual weakness and increased sob as well as le edema.   yesterday , she ambulated with PT 50 feet with supervision level with oxygen after right thoracentesis .  daughter and patient requested to be d/c home after hospital d/c  she is pending a left thoracentesis today       ROS: No fever/chills. no n/v/. comfortable with oxygen in place. so sob with oxygen       PAST MEDICAL & SURGICAL HISTORY      PHYSICAL EXAM  Constitutional - NAD, Comfortable   HEENT - NCAT, EOMI  Neck - Supple, functional ROM  Extremities - bilateral le -no calf  edema - soft, NT , able to straight leg raise bilaterally without pain  observed ambulating with supervision  - non antalgic - no dyspnea   bilateral ue - , functional rom , no focal motor deficits   Neurologic -                    Cognitive - Awake, Alert, Oriented times 3      Speech Intact - no dysarthria      Motor - No focal deficits          A/P 89 year old previously ambulating with RW/SAC and independent with basic adls - now with deconditioning s/p hospitalization   dypnea improved s/p r thoracentesis - pending l thoracenteseis - appreciate pulmonary f/u     i d/w the patient and the daughter goals  they wish to go home once medically cleared  d/c with home care for endurance training   advised to f/u with pmd upon d/c

## 2019-12-20 NOTE — PROGRESS NOTE ADULT - SUBJECTIVE AND OBJECTIVE BOX
INTERVAL HPI/OVERNIGHT EVENTS:        REVIEW OF SYSTEMS:  CONSTITUTIONAL  no  complaints resting  comfortably    NECK: No pain or stiffnes  RESPIRATORY: No SOB   CARDIOVASCULAR: No chest pain, palpitations, dizziness,   GASTROINTESTINAL: No abdominal pain. No nausea, vomiting,   NEUROLOGICAL: No headaches, no  blurry  vision no  dizziness  SKIN: No itching,   MUSCULOSKELETAL: No pain    MEDICATION:  amLODIPine   Tablet 5 milliGRAM(s) Oral daily  ATENolol  Tablet 25 milliGRAM(s) Oral every 12 hours  benzocaine 15 mG/menthol 3.6 mG (Sugar-Free) Lozenge 1 Lozenge Oral every 6 hours PRN  Biotene Dry Mouth Oral Rinse 5 milliLiter(s) Swish and Spit three times a day  furosemide   Injectable 40 milliGRAM(s) IV Push two times a day  heparin  Injectable 5000 Unit(s) SubCutaneous every 12 hours  senna 2 Tablet(s) Oral at bedtime    Vital Signs Last 24 Hrs  T(C): 36.2 (20 Dec 2019 05:20), Max: 37.3 (19 Dec 2019 17:24)  T(F): 97.2 (20 Dec 2019 05:20), Max: 99.1 (19 Dec 2019 17:24)  HR: 79 (20 Dec 2019 05:20) (79 - 109)  BP: 98/69 (20 Dec 2019 05:20) (98/60 - 113/72)  BP(mean): --  RR: 16 (20 Dec 2019 05:20) (16 - 19)  SpO2: 96% (20 Dec 2019 05:20) (94% - 99%)    PHYSICAL EXAM:  GENERAL: NAD,   CHEST/LUNG: symatricalexcursions  HEART: Regular rate  SKIN: No rashes   LABS:        TPro  6.8  /  Alb  x   /  TBili  x   /  DBili  x   /  AST  x   /  ALT  x   /  AlkPhos  x   12-18        CAPILLARY BLOOD GLUCOSE          RADIOLOGY & ADDITIONAL TESTS:    Imaging reports  Personally Reviewed:  [x ] YES  [ ] NO    Consultant(s) Notes Reviewed:  [x ] YES  [ ] NO    Care Discussed with Consultants/Other Providers [x ] YES  [ ] NO  Assessment and Plan:   Problem/Plan - 1:  ·  Problem: Pulmonary edema.  Plan: lasix  betablocker       Problem/Plan - 2:  ·  Problem: Pleural effusion.  Plan: lasix monitor  POST RT  THORACOCENTESIS\  fpr  L  thoracocentesis  today    Problem/Plan - 3:  ·  Problem: Hyponatremia.  Plan: fluid  restrict  continue  Na tabs      Problem/Plan - 4:  ·  Problem: Hypothyroidism.  Plan: continue  synthroid chack levels.    Problem/Plan - 5:  ·  Problem: Atrial fibrillation.  Plan: rate  control.     Problem/Plan - 6:  Problem: HLD (hyperlipidemia). Plan: no clinical advantage  to primary  prevention will d/c.

## 2019-12-20 NOTE — PROGRESS NOTE ADULT - PROBLEM SELECTOR PLAN 7
pt is comfortable on nasal cannula today, is hoping to be released to home in the next few days, no other complaints. For MICHAEL puckett today. Son Larry and daughter Sera at bedside, they are pleased with pt's progress. Daughter had some questions about how to help her move pt to [place] so that she is closer to family as both sons live there and no family resides in [place], pt wants to go to her home in [place]- notified care management team about Sera's inquiry for help with transitions in care across state lines.

## 2019-12-20 NOTE — PROGRESS NOTE ADULT - SUBJECTIVE AND OBJECTIVE BOX
INTERVAL HPI/OVERNIGHT EVENTS: none    Code Status: DNR/I  Allergies    No Known Allergies    Intolerances    MEDICATIONS  (STANDING):  amLODIPine   Tablet 5 milliGRAM(s) Oral daily  ATENolol  Tablet 25 milliGRAM(s) Oral every 12 hours  Biotene Dry Mouth Oral Rinse 5 milliLiter(s) Swish and Spit three times a day  furosemide   Injectable 40 milliGRAM(s) IV Push two times a day  heparin  Injectable 5000 Unit(s) SubCutaneous every 12 hours  senna 2 Tablet(s) Oral at bedtime    MEDICATIONS  (PRN):  benzocaine 15 mG/menthol 3.6 mG (Sugar-Free) Lozenge 1 Lozenge Oral every 6 hours PRN Sore Throat      PRESENT SYMPTOMS: [ ]Unable to obtain due to poor mentation   Source if other than patient:  [ ]Family   [ ]Team     Pain (Impact on QOL):  denies  Location:  Severity:  Minimal acceptable level (0-10 scale):       Quality:       Onset:  Duration:  Aggravating factors:  Relieving Factors  Radiation:    Dyspnea:  Yes [ ] No [x] - [ ]Mild [ ]Moderate [ ]Severe  Anxiety:    Yes [ ] No [ x] - [ ]Mild [ ]Moderate [ ]Severe  Fatigue:    Yes [ ] No [x ] - [ ]Mild [ ]Moderate [ ]Severe  Nausea:    Yes [ ] No [ x] - [ ]Mild [ ]Moderate [ ]Severe                         Loss of appetite: Yes [ ] No [ x] - [ ]Mild [ ]Moderate [ ]Severe             Constipation:  Yes [ ] No [x ] - [ ]Mild [ ]Moderate [ ]Severe  Grief: Yes [ ] No [x ]     PAIN AD Score:	  [email or web address] (Ctrl + left click to view)    Other Symptoms:  [x ]All other review of systems negative     Karnofsky Performance Score/Palliative Performance Status Version 2:         40%    http://palliative.info/resource_material/[email or web address].pdf    PHYSICAL EXAM:  Vital Signs Last 24 Hrs  T(C): 36.2 (20 Dec 2019 05:20), Max: 37.3 (19 Dec 2019 17:24)  T(F): 97.2 (20 Dec 2019 05:20), Max: 99.1 (19 Dec 2019 17:24)  HR: 114 (20 Dec 2019 09:40) (79 - 114)  BP: 83/57 (20 Dec 2019 09:40) (83/57 - 113/72)  BP(mean): --  RR: 16 (20 Dec 2019 05:20) (16 - 19)  SpO2: 96% (20 Dec 2019 05:20) (94% - 99%) I&O's Summary    19 Dec 2019 07:01  -  20 Dec 2019 07:00  --------------------------------------------------------  IN: 460 mL / OUT: 400 mL / NET: 60 mL         GENERAL:  [x ]Alert  [x ]Oriented x 4  [ ]Lethargic  [ x]Cachexia  [ ]Unarousable  [ x]Verbal  [ ]Non-Verbal  Behavioral:   [ ] Anxiety  [ ] Delirium [ ] Agitation [ ] Other  HEENT:  [ ]Normal   [ ]Dry mouth   [ ]ET Tube/Trach  [ ]Oral lesions  PULMONARY:   [x ]Clear [ ]Tachypnea  [ ]Audible excessive secretions   [ ]Rhonchi        [ ]Right [ ]Left [ ]Bilateral  [ ]Crackles        [ ]Right [ ]Left [ ]Bilateral  [ ]Wheezing     [ ]Right [ ]Left [ ]Bilateral  CARDIOVASCULAR:    [ ]Regular [ ]Irregular [x ]Tachy  [ ]Rafael [ ]Murmur [ ]Other  GASTROINTESTINAL:  [x ]Soft  [ ]Distended   [x ]+BS  [ x]Non tender [ ]Tender  [ ]PEG [ ]OGT/ NGT   Last BM: 12/20     GENITOURINARY:  [x ]Normal [ ] Incontinent   [ ]Oliguria/Anuria   [ ]Delong  MUSCULOSKELETAL:   [ ]Normal   [ x]Weakness  [ ]Bed/Wheelchair bound [ ]Edema  NEUROLOGIC:   [ x]No focal deficits  [ ] Cognitive impairment  [ ] Dysphagia [ ]Dysarthria [ ] Paresis [ ]Other   SKIN:   [x ]Normal   [ ]Pressure ulcer(s)  [ ]Rash    CRITICAL CARE:  [ ] Shock Present  [ ]Septic [ ]Cardiogenic [ ]Neurologic [ ]Hypovolemic  [ ]  Vasopressors [ ]  Inotropes   [ ] Respiratory failure present  [ ] Acute  [ ] Chronic [ ] Hypoxic  [ ] Hypercarbic [ ] Other  [ ] Other organ failure     LABS:                        12.3   6.74  )-----------( 252      ( 20 Dec 2019 09:04 )             38.5   12-20    138  |  99  |  36<H>  ----------------------------<  99  3.9   |  36<H>  |  1.12    Ca    8.7      20 Dec 2019 09:04    TPro  6.8  /  Alb  x   /  TBili  x   /  DBili  x   /  AST  x   /  ALT  x   /  AlkPhos  x   12-18        RADIOLOGY & ADDITIONAL STUDIES:    Protein Calorie Malnutrition Present: [x ] yes [ ] no  [ x] PPSV2 < or = 30%  [ ] significant weight loss [x ] poor nutritional intake [ ] anasarca [ ] catabolic state Albumin, Serum: 2.8 g/dL (12-16-19 @ 08:17)      REFERRALS:   [ ]Chaplaincy  [ ] Hospice  [ ]Child Life  [ x]Social Work  [ x]Case management [ ]Holistic Therapy   Goals of Care Document:

## 2019-12-20 NOTE — PROGRESS NOTE ADULT - PROBLEM SELECTOR PLAN 6
normalized with fluid restriction and diuretics  per daughter pt was on salt tabs at home- explained to her that pt's Na level is now normal due to use of restricted fluid intake and lasix and that NaCl tabs do not need to be restarted at present time

## 2019-12-20 NOTE — PROCEDURE NOTE - ADDITIONAL PROCEDURE DETAILS
pt s/p left therapeutic thoracentesis with 525mLs of serous fluid removed.  Pt has remaining pleural fluid but could not tolerate the coughing and pain (now resolving).  Pt tolerated procedure well.  VSS.  CXR ordered  -f/u post procedure CXR results  -f/u RIGHT pleural fluid cx and cytology results

## 2019-12-20 NOTE — PROGRESS NOTE ADULT - PROBLEM SELECTOR PLAN 2
functional decline since hospitalized in August  daughter now staying with pt and caring for her.  preference to have pt return home once medically stable for d/c- daughter and son at bedside trying to plan for care upon discharge are considering having pt move to [place] where both sons reside.

## 2019-12-20 NOTE — PROGRESS NOTE ADULT - SUBJECTIVE AND OBJECTIVE BOX
89y Female complaining of difficulty breathing.  · HPI Objective Statement: 89 yo female from home with pmh GI stromal tumor s/p partial gastrectomy with ?ppx IVC filter, afib not on AC, CKD Stage 3, HTN, HLD, hypothyroid, chronic hyponatremia on Na 1g BID, h/o SAH/IVH  presents with sob at night for past few days but worse today. + b/l LE edema x 1 week.  family also noted pt a little more confused in past 4 days. Pt had routine PMD visit and noted hyponatremia and was awaiting fu tomorrow. pt is AOX3, c/o tachypnea, but denies headache, dizziness, cp, abd pain. Pt c/o b/l calf pain. pt normally ambulates with cane up until last week walking with walker.    	ROS: No fever/chills. No photophobia/eye pain/changes in vision, No ear pain/sore throat/dysphagia, No chest pain/palpitations. +SOB, no cough. No abdominal pain, No N/V/D, no black/bloody bm. No dysuria/frequency/discharge, No headache. No Dizziness.  No rash.  No numbness/tingling/  +weakness +  decreased  hearing (15 Dec 2019 16:54)      Chief Complaint:  Patient is a 89y old  Female who presents with a chief complaint of sob  ryder  hyponatremia pulmonary  edema (16 Dec 2019 17:14)      Review of Systems:    General:  No wt loss, fevers, chills, night sweats  Eyes:  Good vision, no reported pain  ENT:  No sore throat, pain, runny nose, dysphagia  CV:   palpitations,   Resp:   dyspnea,   GI:  No pain, nausea, vomiting, diarrhea, constipation           Social History/Family History  SOCHX:   tobacco,  -  alcohol    FMHX: FA/MO  - contributory       Discussed with:  PMD, Family    Physical Exam:    Vital Signs:  Vital Signs Last 24 Hrs  T(C): 36.3 (17 Dec 2019 05:08), Max: 36.6 (16 Dec 2019 23:30)  T(F): 97.3 (17 Dec 2019 05:08), Max: 97.8 (16 Dec 2019 23:30)  HR: 78 (17 Dec 2019 05:08) (78 - 109)  BP: 112/65 (17 Dec 2019 05:08) (90/67 - 120/66)  BP(mean): --  RR: 16 (17 Dec 2019 05:08) (16 - 18)  SpO2: 93% (17 Dec 2019 05:08) (92% - 99%)  Daily     Daily Weight in k.4 (17 Dec 2019 05:08)  I&O's Summary    16 Dec 2019 07:01  -  17 Dec 2019 07:00  --------------------------------------------------------  IN: 220 mL / OUT: 0 mL / NET: 220 mL        Chest:  decreased at bases  Cardiovascular:  irRegular rhythm, S1, S2, no murmur/rub/S3/S4, no carotid/femoral/abdominal bruit, radial/pedal pulses 2+,  edema  Abdomen:  Soft, non-tender, non-distended, normoactive bowel sounds, no HSM      Laboratory:                          11.8   8.07  )-----------( 319      ( 16 Dec 2019 08:17 )             34.8     12-16    129<L>  |  94<L>  |  19  ----------------------------<  99  3.5   |  27  |  0.87    Ca    8.7      16 Dec 2019 08:17  Mg     1.9     12-15    TPro  6.6  /  Alb  2.8<L>  /  TBili  0.5  /  DBili  x   /  AST  29  /  ALT  40  /  AlkPhos  141<H>  12-16    ABG - ( 15 Dec 2019 15:30 )  pH, Arterial: x     pH, Blood: 7.40  /  pCO2: 34    /  pO2: 72    / HCO3: 21    / Base Excess: -3.0  /  SaO2: 94                CARDIAC MARKERS ( 15 Dec 2019 14:35 )  <.015 ng/mL / x     / x     / x     / x          CAPILLARY BLOOD GLUCOSE        LIVER FUNCTIONS - ( 16 Dec 2019 08:17 )  Alb: 2.8 g/dL / Pro: 6.6 gm/dL / ALK PHOS: 141 U/L / ALT: 40 U/L / AST: 29 U/L / GGT: x           PT/INR - ( 15 Dec 2019 14:35 )   PT: 11.1 sec;   INR: 0.99 ratio         PTT - ( 15 Dec 2019 14:35 )  PTT:32.6 sec  Urinalysis Basic - ( 15 Dec 2019 17:27 )    Color: Yellow / Appearance: Clear / S.015 / pH: x  Gluc: x / Ketone: Negative  / Bili: Negative / Urobili: Negative mg/dL   Blood: x / Protein: 15 mg/dL / Nitrite: Negative   Leuk Esterase: Negative / RBC: x / WBC x   Sq Epi: x / Non Sq Epi: Few / Bacteria: Few      Assessment:  I am asked for consultation on this patient with edema and pleural effusions  The BNP is approximately in the 6000 range, cardiac enzymes remain normal  The EKG reveals atrial fibrillation  Lasix IV push is initiated and will continue  nephrology consultation and noted, Patient's sodium is slowly improving  heart rate and systolic blood pressure remained stable  IV Lasix  post thoracentesis again with improvement,

## 2019-12-20 NOTE — PROGRESS NOTE ADULT - SUBJECTIVE AND OBJECTIVE BOX
INTERVAL HPI:  89 year female with HTN, HLD, Hypothyroidism, S/P partial gastrectomy for GI stromal tumor, S/P IVC filter(was placed after gastrectomy,  A Fib( not on AC due to fall and intracranial bleed), CKD3, Hyponatremia.  Brought in with progressively worsening SOB with Orthopnea, bilateral leg edema, altered mental status, and hyponatremia. CTA chest negative for PE but showed bilateral pleural effusion.  Admitted with pulmonary edema, pleural effusion and hyponatremia. Non smoker.  12/17/19: Had RRT for Respiratory distress, now requiring NRB mask.  12/18/19: 1.5 litre removed with right thoracentesis. TRANSUDATE.  12/20/19:  525 removed with left thoracentesis.  OVERNIGHT EVENTS:  Awake and comfortable.    Vital Signs Last 24 Hrs  T(C): 36.6 (20 Dec 2019 09:40), Max: 37.3 (19 Dec 2019 17:24)  T(F): 97.9 (20 Dec 2019 09:40), Max: 99.1 (19 Dec 2019 17:24)  HR: 97 (20 Dec 2019 09:40) (79 - 109)  BP: 102/73 (20 Dec 2019 09:40) (98/69 - 113/72)  BP(mean): --  RR: 16 (20 Dec 2019 09:40) (16 - 19)  SpO2: 98% (20 Dec 2019 09:40) (94% - 99%)    PHYSICAL EXAM:  GEN:         Awake, responsive and comfortable.  HEENT:    Normal.    RESP:       no wheezing.  CVS:          Regular rate and rhythm.   ABD:         Soft, non-tender, non-distended;     MEDICATIONS  (STANDING):  amLODIPine   Tablet 5 milliGRAM(s) Oral daily  ATENolol  Tablet 25 milliGRAM(s) Oral every 12 hours  Biotene Dry Mouth Oral Rinse 5 milliLiter(s) Swish and Spit three times a day  furosemide   Injectable 40 milliGRAM(s) IV Push two times a day  heparin  Injectable 5000 Unit(s) SubCutaneous every 12 hours  levothyroxine 50 MICROGram(s) Oral daily  senna 2 Tablet(s) Oral at bedtime    MEDICATIONS  (PRN):  benzocaine 15 mG/menthol 3.6 mG (Sugar-Free) Lozenge 1 Lozenge Oral every 6 hours PRN Sore Throat    LABS:                        12.3   6.74  )-----------( 252      ( 20 Dec 2019 09:04 )             38.5     12-20    138  |  99  |  36<H>  ----------------------------<  99  3.9   |  36<H>  |  1.12    Ca    8.7      20 Dec 2019 09:04    12-18 @ 08:17  pH: 7.48  pCO2: 43  pO2: 84  SaO2: 96  12-18 @ 00:38  pH: 7.46  pCO2: 43  pO2: 59  SaO2: 90  12-17 @ 19:59  pH: 7.44  pCO2: 41  pO2: 69  SaO2: 93  12-15 @ 15:30  pH: 7.40  pCO2: 34  pO2: 72  SaO2: 94    ASSESSMENT AND PLAN:  ·	Acute hypoxic Respiratory failure.  ·	Bilateral pleural effusion. S/P right+ left  thoracentesis . TRANSUDATE.  ·	CHF Systolic/diastolic.  ·	Hyponatremia.  ·	Chronic A Fib.  ·	HTN.  ·	HLD.  ·	Hypothyroidism.  ·	S/P Partial Gastrectomy for tumor.  ·	CKD3.    Continue diuretics.  SPO2 on room air before discharge for O2 need.

## 2019-12-20 NOTE — PROGRESS NOTE ADULT - ASSESSMENT
89y/o from home with PMH AF, GI Stromal tumor, CKD 3, Hyponatremia, HTN, HLD, ICH in Aug 2019 now admitted with difficulty breathing/leg edema found to have pulm edema and pleural effusions, placed on BIPAP for distress, s/p thoracentesis,  weaned to nasal oxygen

## 2019-12-21 ENCOUNTER — TRANSCRIPTION ENCOUNTER (OUTPATIENT)
Age: 84
End: 2019-12-21

## 2019-12-21 VITALS — RESPIRATION RATE: 20 BRPM | OXYGEN SATURATION: 96 %

## 2019-12-21 RX ORDER — SENNA PLUS 8.6 MG/1
2 TABLET ORAL
Qty: 0 | Refills: 0 | DISCHARGE
Start: 2019-12-21

## 2019-12-21 RX ORDER — FUROSEMIDE 40 MG
1 TABLET ORAL
Qty: 30 | Refills: 0
Start: 2019-12-21 | End: 2020-01-19

## 2019-12-21 RX ORDER — ATORVASTATIN CALCIUM 80 MG/1
1 TABLET, FILM COATED ORAL
Qty: 0 | Refills: 0 | DISCHARGE

## 2019-12-21 RX ADMIN — Medication 50 MICROGRAM(S): at 05:47

## 2019-12-21 RX ADMIN — Medication 40 MILLIGRAM(S): at 05:48

## 2019-12-21 RX ADMIN — HEPARIN SODIUM 5000 UNIT(S): 5000 INJECTION INTRAVENOUS; SUBCUTANEOUS at 05:47

## 2019-12-21 RX ADMIN — Medication 5 MILLILITER(S): at 05:48

## 2019-12-21 RX ADMIN — AMLODIPINE BESYLATE 5 MILLIGRAM(S): 2.5 TABLET ORAL at 05:47

## 2019-12-21 RX ADMIN — ATENOLOL 25 MILLIGRAM(S): 25 TABLET ORAL at 05:47

## 2019-12-21 NOTE — PROGRESS NOTE ADULT - SUBJECTIVE AND OBJECTIVE BOX
INTERVAL HPI/OVERNIGHT EVENTS:    tolerated thoracocentesis  well    REVIEW OF SYSTEMS:  CONSTITUTIONAL:  no  complaints    NECK: No pain or stiffnes  RESPIRATORY: No SOB   CARDIOVASCULAR: No chest pain, palpitations, dizziness,   GASTROINTESTINAL: No abdominal pain. No nausea, vomiting,   NEUROLOGICAL: No headaches, no  blurry  vision no  dizziness  SKIN: No itching,   MUSCULOSKELETAL: No pain    MEDICATION:  amLODIPine   Tablet 5 milliGRAM(s) Oral daily  ATENolol  Tablet 25 milliGRAM(s) Oral every 12 hours  benzocaine 15 mG/menthol 3.6 mG (Sugar-Free) Lozenge 1 Lozenge Oral every 6 hours PRN  Biotene Dry Mouth Oral Rinse 5 milliLiter(s) Swish and Spit three times a day  furosemide   Injectable 40 milliGRAM(s) IV Push two times a day  heparin  Injectable 5000 Unit(s) SubCutaneous every 12 hours  levothyroxine 50 MICROGram(s) Oral daily  senna 2 Tablet(s) Oral at bedtime    Vital Signs Last 24 Hrs  T(C): 36.3 (21 Dec 2019 05:16), Max: 37.1 (20 Dec 2019 12:45)  T(F): 97.4 (21 Dec 2019 05:16), Max: 98.7 (20 Dec 2019 12:45)  HR: 80 (21 Dec 2019 05:16) (68 - 110)  BP: 110/66 (21 Dec 2019 05:16) (102/73 - 154/54)  BP(mean): --  RR: 18 (21 Dec 2019 05:16) (16 - 196)  SpO2: 97% (21 Dec 2019 05:16) (97% - 100%)    PHYSICAL EXAM:  GENERAL: NAD, well-groomed, well-developed  HEENT : Conj[place]ae  clear sclerae anicteric  NECK: Supple, No JVD, Normal thyroid  NERVOUS SYSTEM:  Alert oriented   no  focal  deficits;   CHEST/LUNG: decrease  at  bases    HEART: Regular rate and rhythm; No murmurs, rubs, or gallops  ABDOMEN: Soft, Nontender, Nondistended; Bowel sounds present  EXTREMITIES:  no  edema no  tenderness  SKIN: No rashes   LABS:                        12.3   6.74  )-----------( 252      ( 20 Dec 2019 09:04 )             38.5     12-20    138  |  99  |  36<H>  ----------------------------<  99  3.9   |  36<H>  |  1.12    Ca    8.7      20 Dec 2019 09:04          CAPILLARY BLOOD GLUCOSE          RADIOLOGY & ADDITIONAL TESTS:    Imaging reports  Personally Reviewed:  [x ] YES  [ ] NO    Consultant(s) Notes Reviewed:  [x ] YES  [ ] NO    Care Discussed with Consultants/Other Providers [ x] YES  [ ] NO  Assessment and Plan:   Problem/Plan - 1:  ·  Problem: Pulmonary edema.  Plan: lasix  betablocker       Problem/Plan - 2:  ·  Problem: Pleural effusion.  Plan: lasix monitor  POST R  THORACOCENTESIS    Problem/Plan - 3:  ·  Problem: Hyponatremia.  Plan: fluid  restrict  continue  Na tabs      Problem/Plan - 4:  ·  Problem: Hypothyroidism.  Plan: continue  synthroid chack levels.    Problem/Plan - 5:  ·  Problem: Atrial fibrillation.  Plan: rate  control.     Problem/Plan - 6:  Problem: HLD (hyperlipidemia). Plan: no clinical advantage  to primary  prevention will d/c.  dischage  home  discussed  with  patient  and  pt's  daughter  at  bedside

## 2019-12-21 NOTE — PROGRESS NOTE ADULT - SUBJECTIVE AND OBJECTIVE BOX
89y Female complaining of difficulty breathing.  · HPI Objective Statement: 89 yo female from home with pmh GI stromal tumor s/p partial gastrectomy with ?ppx IVC filter, afib not on AC, CKD Stage 3, HTN, HLD, hypothyroid, chronic hyponatremia on Na 1g BID, h/o SAH/IVH  presents with sob at night for past few days but worse today. + b/l LE edema x 1 week.  family also noted pt a little more confused in past 4 days. Pt had routine PMD visit and noted hyponatremia and was awaiting fu tomorrow. pt is AOX3, c/o tachypnea, but denies headache, dizziness, cp, abd pain. Pt c/o b/l calf pain. pt normally ambulates with cane up until last week walking with walker.    	ROS: No fever/chills. No photophobia/eye pain/changes in vision, No ear pain/sore throat/dysphagia, No chest pain/palpitations. +SOB, no cough. No abdominal pain, No N/V/D, no black/bloody bm. No dysuria/frequency/discharge, No headache. No Dizziness.  No rash.  No numbness/tingling/  +weakness +  decreased  hearing (15 Dec 2019 16:54)      Chief Complaint:  Patient is a 89y old  Female who presents with a chief complaint of sob  ryder  hyponatremia pulmonary  edema (16 Dec 2019 17:14)      Review of Systems:    General:  No wt loss, fevers, chills, night sweats  Eyes:  Good vision, no reported pain  ENT:  No sore throat, pain, runny nose, dysphagia  CV:   palpitations,   Resp:   dyspnea,   GI:  No pain, nausea, vomiting, diarrhea, constipation           Social History/Family History  SOCHX:   tobacco,  -  alcohol    FMHX: FA/MO  - contributory       Discussed with:  PMD, Family    Physical Exam:    Vital Signs:  Vital Signs Last 24 Hrs  T(C): 36.3 (17 Dec 2019 05:08), Max: 36.6 (16 Dec 2019 23:30)  T(F): 97.3 (17 Dec 2019 05:08), Max: 97.8 (16 Dec 2019 23:30)  HR: 78 (17 Dec 2019 05:08) (78 - 109)  BP: 112/65 (17 Dec 2019 05:08) (90/67 - 120/66)  BP(mean): --  RR: 16 (17 Dec 2019 05:08) (16 - 18)  SpO2: 93% (17 Dec 2019 05:08) (92% - 99%)  Daily     Daily Weight in k.4 (17 Dec 2019 05:08)  I&O's Summary    16 Dec 2019 07:01  -  17 Dec 2019 07:00  --------------------------------------------------------  IN: 220 mL / OUT: 0 mL / NET: 220 mL        Chest:  decreased at bases  Cardiovascular:  irRegular rhythm, S1, S2, no murmur/rub/S3/S4, no carotid/femoral/abdominal bruit, radial/pedal pulses 2+,  edema  Abdomen:  Soft, non-tender, non-distended, normoactive bowel sounds, no HSM      Laboratory:                          11.8   8.07  )-----------( 319      ( 16 Dec 2019 08:17 )             34.8     12-16    129<L>  |  94<L>  |  19  ----------------------------<  99  3.5   |  27  |  0.87    Ca    8.7      16 Dec 2019 08:17  Mg     1.9     12-15    TPro  6.6  /  Alb  2.8<L>  /  TBili  0.5  /  DBili  x   /  AST  29  /  ALT  40  /  AlkPhos  141<H>  12-16    ABG - ( 15 Dec 2019 15:30 )  pH, Arterial: x     pH, Blood: 7.40  /  pCO2: 34    /  pO2: 72    / HCO3: 21    / Base Excess: -3.0  /  SaO2: 94                CARDIAC MARKERS ( 15 Dec 2019 14:35 )  <.015 ng/mL / x     / x     / x     / x          CAPILLARY BLOOD GLUCOSE        LIVER FUNCTIONS - ( 16 Dec 2019 08:17 )  Alb: 2.8 g/dL / Pro: 6.6 gm/dL / ALK PHOS: 141 U/L / ALT: 40 U/L / AST: 29 U/L / GGT: x           PT/INR - ( 15 Dec 2019 14:35 )   PT: 11.1 sec;   INR: 0.99 ratio         PTT - ( 15 Dec 2019 14:35 )  PTT:32.6 sec  Urinalysis Basic - ( 15 Dec 2019 17:27 )    Color: Yellow / Appearance: Clear / S.015 / pH: x  Gluc: x / Ketone: Negative  / Bili: Negative / Urobili: Negative mg/dL   Blood: x / Protein: 15 mg/dL / Nitrite: Negative   Leuk Esterase: Negative / RBC: x / WBC x   Sq Epi: x / Non Sq Epi: Few / Bacteria: Few      Assessment:  I am asked for consultation on this patient with edema and pleural effusions  The BNP is approximately in the 6000 range, cardiac enzymes remain normal  The EKG reveals atrial fibrillation  Lasix IV push is initiated and will continue  nephrology consultation and noted, Patient's sodium is slowly improving  heart rate and systolic blood pressure remained stable  IV Lasix  post thoracentesis again with improvement,   DC

## 2019-12-21 NOTE — DISCHARGE NOTE PROVIDER - NSDCMRMEDTOKEN_GEN_ALL_CORE_FT
amLODIPine 5 mg oral tablet: 1 tab(s) orally once a day  atenolol 25 mg oral tablet: 1 tab(s) orally every 12 hours  Lasix 20 mg oral tablet: 1 tab(s) orally once a day   levothyroxine 50 mcg (0.05 mg) oral tablet: 1 tab(s) orally once a day  senna oral tablet: 2 tab(s) orally once a day (at bedtime)

## 2019-12-21 NOTE — DISCHARGE NOTE PROVIDER - NSDCCPCAREPLAN_GEN_ALL_CORE_FT
PRINCIPAL DISCHARGE DIAGNOSIS  Diagnosis: Pleural effusion  Assessment and Plan of Treatment:       SECONDARY DISCHARGE DIAGNOSES  Diagnosis: Hyponatremia  Assessment and Plan of Treatment:

## 2019-12-21 NOTE — DISCHARGE NOTE PROVIDER - CARE PROVIDER_API CALL
PMD,   Phone: (   )    -  Fax: (   )    -  Follow Up Time:     Fawad Klein)  [address] [place] [address]  Phone: [phone]  Fax: [phone]  Follow Up Time:

## 2019-12-21 NOTE — PROGRESS NOTE ADULT - REASON FOR ADMISSION
sob  ryder  hyponatremia pulmonary  edema
sob  ryder  hyponatremia pulmonary  edema, s/p rapid respiratory response
sob  ryder  hyponatremia pulmonary  edema

## 2019-12-21 NOTE — DISCHARGE NOTE PROVIDER - NSDCFUSCHEDAPPT_GEN_ALL_CORE_FT
YOLANDA SAINZ ; 02/10/2020 ; [facility] [address] YOLANDA Naik ; 03/16/2020 ; [facility]

## 2019-12-21 NOTE — DISCHARGE NOTE PROVIDER - HOSPITAL COURSE
patient  treated  with  diuretics  with  good  initial  response had  acute  respiratory  failure treated  successfully  with BIPAP  and   subsequent  Bilateral Thoracocentesis with  excellent  results,  Na  normalized no  CP  no  SOB  appetite  good  sleeping  well    ambulating  with  assistance  patient  and  family  request  home  discharge

## 2019-12-21 NOTE — DISCHARGE NOTE PROVIDER - CARE PROVIDERS DIRECT ADDRESSES
,DirectAddress_Unknown,[email or web address]

## 2019-12-21 NOTE — DISCHARGE NOTE NURSING/CASE MANAGEMENT/SOCIAL WORK - PATIENT PORTAL LINK FT
You can access the FollowMyHealth Patient Portal offered by [facility] by registering at the following website: [email or web address]. By joining [employer]’s FollowMyHealth portal, you will also be able to view your health information using other applications (apps) compatible with our system.

## 2019-12-23 LAB
CULTURE RESULTS: SIGNIFICANT CHANGE UP
GRAM STN FLD: SIGNIFICANT CHANGE UP
SPECIMEN SOURCE: SIGNIFICANT CHANGE UP

## 2019-12-27 DIAGNOSIS — J81.0 ACUTE PULMONARY EDEMA: ICD-10-CM

## 2019-12-27 DIAGNOSIS — J90 PLEURAL EFFUSION, NOT ELSEWHERE CLASSIFIED: ICD-10-CM

## 2019-12-27 DIAGNOSIS — E03.9 HYPOTHYROIDISM, UNSPECIFIED: ICD-10-CM

## 2019-12-27 DIAGNOSIS — I13.0 HYPERTENSIVE HEART AND CHRONIC KIDNEY DISEASE WITH HEART FAILURE AND STAGE 1 THROUGH STAGE 4 CHRONIC KIDNEY DISEASE, OR UNSPECIFIED CHRONIC KIDNEY DISEASE: ICD-10-CM

## 2019-12-27 DIAGNOSIS — Z95.828 PRESENCE OF OTHER VASCULAR IMPLANTS AND GRAFTS: ICD-10-CM

## 2019-12-27 DIAGNOSIS — E78.5 HYPERLIPIDEMIA, UNSPECIFIED: ICD-10-CM

## 2019-12-27 DIAGNOSIS — J96.01 ACUTE RESPIRATORY FAILURE WITH HYPOXIA: ICD-10-CM

## 2019-12-27 DIAGNOSIS — Z51.5 ENCOUNTER FOR PALLIATIVE CARE: ICD-10-CM

## 2019-12-27 DIAGNOSIS — I50.40 UNSPECIFIED COMBINED SYSTOLIC (CONGESTIVE) AND DIASTOLIC (CONGESTIVE) HEART FAILURE: ICD-10-CM

## 2019-12-27 DIAGNOSIS — N18.3 CHRONIC KIDNEY DISEASE, STAGE 3 (MODERATE): ICD-10-CM

## 2019-12-27 DIAGNOSIS — I48.20 CHRONIC ATRIAL FIBRILLATION, UNSPECIFIED: ICD-10-CM

## 2019-12-27 DIAGNOSIS — E87.1 HYPO-OSMOLALITY AND HYPONATREMIA: ICD-10-CM

## 2020-02-01 LAB
25(OH)D3 SERPL-MCNC: 44.2 NG/ML
ALBUMIN SERPL ELPH-MCNC: 4.2 G/DL
ALP BLD-CCNC: 125 U/L
ALT SERPL-CCNC: 29 U/L
ANION GAP SERPL CALC-SCNC: 13 MMOL/L
AST SERPL-CCNC: 32 U/L
BASOPHILS # BLD AUTO: 0.05 K/UL
BASOPHILS NFR BLD AUTO: 0.7 %
BILIRUB SERPL-MCNC: 0.3 MG/DL
BUN SERPL-MCNC: 29 MG/DL
CALCIUM SERPL-MCNC: 9.9 MG/DL
CALCIUM SERPL-MCNC: 9.9 MG/DL
CHLORIDE SERPL-SCNC: 102 MMOL/L
CHOLEST SERPL-MCNC: 209 MG/DL
CHOLEST/HDLC SERPL: 2.1 RATIO
CO2 SERPL-SCNC: 22 MMOL/L
CREAT SERPL-MCNC: 1.03 MG/DL
EOSINOPHIL # BLD AUTO: 0.16 K/UL
EOSINOPHIL NFR BLD AUTO: 2.1 %
ESTIMATED AVERAGE GLUCOSE: 114 MG/DL
GLUCOSE SERPL-MCNC: 100 MG/DL
HBA1C MFR BLD HPLC: 5.6 %
HCT VFR BLD CALC: 41.1 %
HDLC SERPL-MCNC: 101 MG/DL
HGB BLD-MCNC: 13.1 G/DL
IMM GRANULOCYTES NFR BLD AUTO: 0.3 %
LDLC SERPL CALC-MCNC: 95 MG/DL
LYMPHOCYTES # BLD AUTO: 2.02 K/UL
LYMPHOCYTES NFR BLD AUTO: 26.3 %
MAGNESIUM SERPL-MCNC: 2 MG/DL
MAN DIFF?: NORMAL
MCHC RBC-ENTMCNC: 28.7 PG
MCHC RBC-ENTMCNC: 31.9 GM/DL
MCV RBC AUTO: 89.9 FL
MONOCYTES # BLD AUTO: 0.91 K/UL
MONOCYTES NFR BLD AUTO: 11.8 %
NEUTROPHILS # BLD AUTO: 4.52 K/UL
NEUTROPHILS NFR BLD AUTO: 58.8 %
PARATHYROID HORMONE INTACT: 102 PG/ML
PHOSPHATE SERPL-MCNC: 4.1 MG/DL
PLATELET # BLD AUTO: 313 K/UL
POTASSIUM SERPL-SCNC: 5.3 MMOL/L
PROT SERPL-MCNC: 7.3 G/DL
RBC # BLD: 4.57 M/UL
RBC # FLD: 14.4 %
SODIUM SERPL-SCNC: 137 MMOL/L
TRIGL SERPL-MCNC: 64 MG/DL
URATE SERPL-MCNC: 5.2 MG/DL
WBC # FLD AUTO: 7.68 K/UL

## 2020-02-10 ENCOUNTER — APPOINTMENT (OUTPATIENT)
Dept: NEPHROLOGY | Facility: CLINIC | Age: 85
End: 2020-02-10

## 2020-03-16 ENCOUNTER — APPOINTMENT (OUTPATIENT)
Dept: NEUROLOGY | Facility: CLINIC | Age: 85
End: 2020-03-16

## 2022-07-12 NOTE — PHYSICAL THERAPY INITIAL EVALUATION ADULT - WEIGHT-BEARING RESTRICTIONS, REHAB EVAL
Spoke with patient PCP advised to go to ER I advised we would recommenced the same full weight-bearing

## 2023-08-02 NOTE — PHYSICAL THERAPY INITIAL EVALUATION ADULT - BALANCE TRAINING, PT EVAL
Assistance OOB with selected safe patient handling equipment if applicable/Assistance with ambulation/Communicate risk of Fall with Harm to all staff, patient, and family/Monitor gait and stability/Provide patient with walking aids/Provide visual cue: red socks, yellow wristband, yellow gown, etc/Reinforce activity limits and safety measures with patient and family/Bed in lowest position, wheels locked, appropriate side rails in place/Call bell, personal items and telephone in reach/Instruct patient to call for assistance before getting out of bed/chair/stretcher/Non-slip footwear applied when patient is off stretcher/[place] to call system/Physically safe environment - no spills, clutter or unnecessary equipment/Purposeful Proactive Rounding/Room/bathroom lighting operational, light cord in reach GOAL: pt will demonstrate good standing posture w/i 2wks

## 2024-08-26 NOTE — PROGRESS NOTE ADULT - PROBLEM SELECTOR PLAN 7
[Initial Evaluation] : an initial evaluation pt is comfortable on nasal cannula today, is hoping to be released to home in the next few days, no other complaints. MOLST filled out yesterday using pt's living will as reference point coupled with discussion between pt and children.

## 2024-10-11 NOTE — ED PROVIDER NOTE - NSTIMEPROVIDERCAREINITIATE_GEN_ER
Reason for call:   [x] Refill   [] Prior Auth  [] Other:     Office:   [x] PCP/Provider -   [] Specialty/Provider -     Medication:     Thera (THERA/BETA-CAROTENE)       Dose/Frequency: Take 1 tablet by mouth daily,     Quantity: 30 tablet    Ph[facility]acy: [facility] - [place], [place] - [address]     Does the patient have enough for 3 days?   [x] Yes   [] No - Send as HP to POD     24-Aug-2017 22:12